# Patient Record
Sex: MALE | Race: WHITE | NOT HISPANIC OR LATINO
[De-identification: names, ages, dates, MRNs, and addresses within clinical notes are randomized per-mention and may not be internally consistent; named-entity substitution may affect disease eponyms.]

---

## 2018-12-26 ENCOUNTER — MEDICATION RENEWAL (OUTPATIENT)
Age: 83
End: 2018-12-26

## 2019-01-31 ENCOUNTER — OTHER (OUTPATIENT)
Age: 84
End: 2019-01-31

## 2019-02-01 ENCOUNTER — RECORD ABSTRACTING (OUTPATIENT)
Age: 84
End: 2019-02-01

## 2019-02-01 DIAGNOSIS — R19.7 DIARRHEA, UNSPECIFIED: ICD-10-CM

## 2019-02-01 DIAGNOSIS — Z98.62 PERIPHERAL VASCULAR ANGIOPLASTY STATUS: ICD-10-CM

## 2019-02-01 DIAGNOSIS — Z87.09 PERSONAL HISTORY OF OTHER DISEASES OF THE RESPIRATORY SYSTEM: ICD-10-CM

## 2019-02-01 DIAGNOSIS — M23.90 UNSPECIFIED INTERNAL DERANGEMENT OF UNSPECIFIED KNEE: ICD-10-CM

## 2019-02-01 RX ORDER — FLUTICASONE PROPIONATE 50 UG/1
50 SPRAY, METERED NASAL DAILY
Qty: 1 | Refills: 0 | Status: ACTIVE | COMMUNITY

## 2019-02-01 RX ORDER — IPRATROPIUM BROMIDE 21 UG/1
0.03 SPRAY NASAL
Refills: 0 | Status: ACTIVE | COMMUNITY

## 2019-02-01 RX ORDER — AZELASTINE HYDROCHLORIDE 137 UG/1
0.1 SPRAY, METERED NASAL
Refills: 0 | Status: ACTIVE | COMMUNITY

## 2019-02-04 ENCOUNTER — APPOINTMENT (OUTPATIENT)
Dept: INTERNAL MEDICINE | Facility: CLINIC | Age: 84
End: 2019-02-04
Payer: MEDICARE

## 2019-02-04 ENCOUNTER — NON-APPOINTMENT (OUTPATIENT)
Age: 84
End: 2019-02-04

## 2019-02-04 ENCOUNTER — INPATIENT (INPATIENT)
Facility: HOSPITAL | Age: 84
LOS: 2 days | Discharge: HOME | End: 2019-02-07
Attending: HOSPITALIST | Admitting: HOSPITALIST
Payer: MEDICARE

## 2019-02-04 VITALS
TEMPERATURE: 97.4 F | WEIGHT: 160 LBS | SYSTOLIC BLOOD PRESSURE: 114 MMHG | RESPIRATION RATE: 16 BRPM | OXYGEN SATURATION: 96 % | HEIGHT: 72 IN | BODY MASS INDEX: 21.67 KG/M2 | HEART RATE: 39 BPM | DIASTOLIC BLOOD PRESSURE: 52 MMHG

## 2019-02-04 VITALS
HEART RATE: 46 BPM | SYSTOLIC BLOOD PRESSURE: 115 MMHG | RESPIRATION RATE: 16 BRPM | DIASTOLIC BLOOD PRESSURE: 40 MMHG | TEMPERATURE: 98 F | OXYGEN SATURATION: 99 %

## 2019-02-04 DIAGNOSIS — N40.0 BENIGN PROSTATIC HYPERPLASIA WITHOUT LOWER URINARY TRACT SYMPTOMS: ICD-10-CM

## 2019-02-04 DIAGNOSIS — I25.10 ATHEROSCLEROTIC HEART DISEASE OF NATIVE CORONARY ARTERY WITHOUT ANGINA PECTORIS: ICD-10-CM

## 2019-02-04 DIAGNOSIS — Z98.890 OTHER SPECIFIED POSTPROCEDURAL STATES: Chronic | ICD-10-CM

## 2019-02-04 DIAGNOSIS — Z95.5 PRESENCE OF CORONARY ANGIOPLASTY IMPLANT AND GRAFT: Chronic | ICD-10-CM

## 2019-02-04 DIAGNOSIS — Z29.9 ENCOUNTER FOR PROPHYLACTIC MEASURES, UNSPECIFIED: ICD-10-CM

## 2019-02-04 DIAGNOSIS — Z95.810 PRESENCE OF AUTOMATIC (IMPLANTABLE) CARDIAC DEFIBRILLATOR: Chronic | ICD-10-CM

## 2019-02-04 DIAGNOSIS — T14.8XXA OTHER INJURY OF UNSPECIFIED BODY REGION, INITIAL ENCOUNTER: ICD-10-CM

## 2019-02-04 DIAGNOSIS — D64.9 ANEMIA, UNSPECIFIED: ICD-10-CM

## 2019-02-04 DIAGNOSIS — Z96.659 PRESENCE OF UNSPECIFIED ARTIFICIAL KNEE JOINT: Chronic | ICD-10-CM

## 2019-02-04 DIAGNOSIS — I10 ESSENTIAL (PRIMARY) HYPERTENSION: ICD-10-CM

## 2019-02-04 DIAGNOSIS — I49.3 VENTRICULAR PREMATURE DEPOLARIZATION: ICD-10-CM

## 2019-02-04 DIAGNOSIS — I49.9 CARDIAC ARRHYTHMIA, UNSPECIFIED: ICD-10-CM

## 2019-02-04 LAB
T4 AB SER-ACNC: 5.23 UG/DL — SIGNIFICANT CHANGE UP (ref 5.1–13)
TROPONIN T, HIGH SENSITIVITY: 16 NG/L — SIGNIFICANT CHANGE UP (ref ?–14)
TSH SERPL-MCNC: 0.92 UIU/ML — SIGNIFICANT CHANGE UP (ref 0.27–4.2)

## 2019-02-04 PROCEDURE — 99214 OFFICE O/P EST MOD 30 MIN: CPT | Mod: 25,PD

## 2019-02-04 PROCEDURE — 71046 X-RAY EXAM CHEST 2 VIEWS: CPT | Mod: 26

## 2019-02-04 PROCEDURE — 93000 ELECTROCARDIOGRAM COMPLETE: CPT | Mod: PD

## 2019-02-04 PROCEDURE — 99223 1ST HOSP IP/OBS HIGH 75: CPT

## 2019-02-04 PROCEDURE — 99223 1ST HOSP IP/OBS HIGH 75: CPT | Mod: GC

## 2019-02-04 RX ORDER — TAMSULOSIN HYDROCHLORIDE 0.4 MG/1
0.4 CAPSULE ORAL AT BEDTIME
Qty: 0 | Refills: 0 | Status: DISCONTINUED | OUTPATIENT
Start: 2019-02-04 | End: 2019-02-07

## 2019-02-04 RX ORDER — AMIODARONE HYDROCHLORIDE 400 MG/1
400 TABLET ORAL ONCE
Qty: 0 | Refills: 0 | Status: COMPLETED | OUTPATIENT
Start: 2019-02-04 | End: 2019-02-04

## 2019-02-04 RX ORDER — LOSARTAN POTASSIUM 100 MG/1
25 TABLET, FILM COATED ORAL EVERY 12 HOURS
Qty: 0 | Refills: 0 | Status: DISCONTINUED | OUTPATIENT
Start: 2019-02-04 | End: 2019-02-07

## 2019-02-04 RX ORDER — FINASTERIDE 5 MG/1
5 TABLET, FILM COATED ORAL DAILY
Qty: 0 | Refills: 0 | Status: DISCONTINUED | OUTPATIENT
Start: 2019-02-04 | End: 2019-02-07

## 2019-02-04 RX ORDER — SIMVASTATIN 20 MG/1
20 TABLET, FILM COATED ORAL AT BEDTIME
Qty: 0 | Refills: 0 | Status: DISCONTINUED | OUTPATIENT
Start: 2019-02-04 | End: 2019-02-07

## 2019-02-04 RX ORDER — ASPIRIN/CALCIUM CARB/MAGNESIUM 324 MG
81 TABLET ORAL DAILY
Qty: 0 | Refills: 0 | Status: DISCONTINUED | OUTPATIENT
Start: 2019-02-04 | End: 2019-02-07

## 2019-02-04 RX ORDER — SODIUM CHLORIDE 9 MG/ML
1000 INJECTION INTRAMUSCULAR; INTRAVENOUS; SUBCUTANEOUS ONCE
Qty: 0 | Refills: 0 | Status: COMPLETED | OUTPATIENT
Start: 2019-02-04 | End: 2019-02-04

## 2019-02-04 RX ORDER — AMIODARONE HYDROCHLORIDE 400 MG/1
200 TABLET ORAL DAILY
Qty: 0 | Refills: 0 | Status: DISCONTINUED | OUTPATIENT
Start: 2019-02-08 | End: 2019-02-07

## 2019-02-04 RX ORDER — SODIUM CHLORIDE 9 MG/ML
3 INJECTION INTRAMUSCULAR; INTRAVENOUS; SUBCUTANEOUS EVERY 8 HOURS
Qty: 0 | Refills: 0 | Status: DISCONTINUED | OUTPATIENT
Start: 2019-02-04 | End: 2019-02-07

## 2019-02-04 RX ORDER — AMIODARONE HYDROCHLORIDE 400 MG/1
400 TABLET ORAL EVERY 8 HOURS
Qty: 0 | Refills: 0 | Status: DISCONTINUED | OUTPATIENT
Start: 2019-02-05 | End: 2019-02-07

## 2019-02-04 RX ORDER — FINASTERIDE 5 MG/1
5 TABLET, FILM COATED ORAL DAILY
Qty: 90 | Refills: 0 | Status: DISCONTINUED | COMMUNITY
End: 2019-02-04

## 2019-02-04 RX ORDER — ESCITALOPRAM OXALATE 10 MG/1
20 TABLET, FILM COATED ORAL DAILY
Qty: 0 | Refills: 0 | Status: DISCONTINUED | OUTPATIENT
Start: 2019-02-04 | End: 2019-02-07

## 2019-02-04 RX ADMIN — SODIUM CHLORIDE 2000 MILLILITER(S): 9 INJECTION INTRAMUSCULAR; INTRAVENOUS; SUBCUTANEOUS at 18:28

## 2019-02-04 RX ADMIN — AMIODARONE HYDROCHLORIDE 400 MILLIGRAM(S): 400 TABLET ORAL at 18:27

## 2019-02-04 RX ADMIN — SODIUM CHLORIDE 3 MILLILITER(S): 9 INJECTION INTRAMUSCULAR; INTRAVENOUS; SUBCUTANEOUS at 22:36

## 2019-02-04 RX ADMIN — SIMVASTATIN 20 MILLIGRAM(S): 20 TABLET, FILM COATED ORAL at 22:34

## 2019-02-04 RX ADMIN — TAMSULOSIN HYDROCHLORIDE 0.4 MILLIGRAM(S): 0.4 CAPSULE ORAL at 22:34

## 2019-02-04 NOTE — ED PROVIDER NOTE - MEDICAL DECISION MAKING DETAILS
Priscilla: 84M w/PMH MI s/p stents, AICD/PPM, recent hernia repair p/w bradycardia/bigeminy. R/o AICD malfunction, lyte abnormality, arrhythmia. Labs, EKG/tele, EP interrogation, admit.

## 2019-02-04 NOTE — H&P ADULT - PROBLEM SELECTOR PLAN 2
cont coreg, cozaar Monitor h/H closely.  patient with recent surgery and post op complication of femoral hematoma. Monitor the hematoma closely.   B12, folate, retic ordered Monitor h/H closely.  patient with recent surgery and post op complication of femoral hematoma. Monitor the hematoma closely.   B12, folate, retic ordered in setting of macrocytosis

## 2019-02-04 NOTE — ED ADULT NURSE NOTE - NSIMPLEMENTINTERV_GEN_ALL_ED
Implemented All Fall Risk Interventions:  [place] to call system. Call bell, personal items and telephone within reach. Instruct patient to call for assistance. Room bathroom lighting operational. Non-slip footwear when patient is off stretcher. Physically safe environment: no spills, clutter or unnecessary equipment. Stretcher in lowest position, wheels locked, appropriate side rails in place. Provide visual cue, wrist band, yellow gown, etc. Monitor gait and stability. Monitor for mental status changes and reorient to person, place, and time. Review medications for side effects contributing to fall risk. Reinforce activity limits and safety measures with patient and family.

## 2019-02-04 NOTE — ED ADULT TRIAGE NOTE - CHIEF COMPLAINT QUOTE
Pt sent by his PMD for low heart rate, possible malfunctioning PPM/AICD.  Pt's heart rate at PMD office was 39, current heart rate is 46.  EKG abnormal in triage, pt denies pain.

## 2019-02-04 NOTE — H&P ADULT - PROBLEM SELECTOR PLAN 1
Admit to tele  check cbc, bmp, a1c, flp, tsh, trend CE  EP consult appreciated  Amio load per EP for increased PVC burden since the surgery   possible ablation Admit to tele  check cbc, bmp, a1c, flp, tsh, trend CE  EP consult appreciated  Amio load per EP for increased PVC burden since the surgery. Will hold coreg per EP  Echo ordered   possible ablation this admission or as outpatient  Discussed with Dr. Ly

## 2019-02-04 NOTE — H&P ADULT - ASSESSMENT
85yo male with past medical history of hypertension, hyperlipidemia,  coronary artery disease s/p MI 2006, s/p stents, ischemic cardiomyopathy, ventricular tachycardia s/p Medtronic dual chamber ICD 4/2007 with generator change 2 years ago, osteoarthritis s/p bilateral total knee replacements and recent incarcerated inguinal hernia s/p repair 1/13/2019 at [facility] complicated by hematoma and anemia sent by his cardiologist for an abnormal heart rate.

## 2019-02-04 NOTE — CONSULT NOTE ADULT - ASSESSMENT
Patient is a 83yo male with past medical history of hypertension, hyperlipidemia,  coronary artery disease s/p MI 2006, s/p stents, ischemic cardiomyopathy, ventricular tachycardia s/p Medtronic dual chamber ICD 4/2007 with generator change 2 years ago, osteoarthritis s/p bilateral total knee replacements and recent incarcerated inguinal hernia s/p repair 1/13/2019 at [facility] complicated by hematoma and anemia sent by his cardiologist for an abnormal heart rate.  ICD interrogation shows normal sinus rhythm, PVC's with significantly increased PVC burden and intermittent bigeminy. No nonsustained VT or therapies.  No atrial arrhythmias.  Patient currently asymptomatic. Device sensing  and pacing well. Battery good. Electrolytes WNL and Hgb/HCT stable.    Impression: A possible explanation for his symptoms include an increased PVC burden since his surgery on 1/13/2019. We have discussed with the patient and his wife treatment strategies including suppression with an antiarrhythmic agent and/or PVC ablation.  After reviewing the risks and benefits they have decided on medical management at this time with possible ablation in the future when he feels more recovered from his hernia surgery.   Plan:  Amiodarone 5 gram load: 400mg po tid x 12 doses, then 200mg daily for maintenance.           TFT's with next blood draw.  Will need PFT's as an outpatient.           Cardiac enzymes           Viral panel            His lower rate limit on the device was increased to 60 bpm which will prevent medication induced bradycardia.

## 2019-02-04 NOTE — H&P ADULT - HISTORY OF PRESENT ILLNESS
85yo male with past medical history of hypertension, hyperlipidemia,  coronary artery disease s/p MI 2006, s/p stents, ischemic cardiomyopathy, ventricular tachycardia s/p Medtronic dual chamber ICD 4/2007 with generator change 2 years ago, osteoarthritis s/p bilateral total knee replacements and recent incarcerated inguinal hernia s/p repair 1/13/2019 at [facility] complicated by hematoma and anemia sent by his cardiologist for an abnormal heart rate. Patient went to PMD today for a regular check up. At PMD's office, his heart rate was low as 39 and PPM was capturing [per ED notes] and was sent in for further eval. Patient states he was having generalized fatigue and poor appetite since the surgery and has lost about 8 lbs since then. He had an emergent inguinal hernia surgery about few weeks ago which was complicated by hematoma. He states the hematoma is improving and his surgical incision is also healing. denies any pain or other discomfort at that surgical site. He also complains of intermittent dizziness which is worse while ambulating since the surgery. No syncope or falls. He also complains of intermittent palpitations for months. Denies fever, chills, cough, falls, LOC, chest pain, abdominal pain, nausea, vomiting, melena, hematochezia, LE edema, or calf tenderness.     Previously on plavix prior to the surgery

## 2019-02-04 NOTE — H&P ADULT - PSH
History of knee replacement    S/P coronary artery stent placement    S/P hernia surgery  Emergent incarcerated Inguinal hernia  sx complicated by hematoma and anemia in Jan 2019  S/P implantation of automatic cardioverter/defibrillator (AICD)  AND PPM

## 2019-02-04 NOTE — ED PROVIDER NOTE - SKIN, MLM
Skin normal color for race, warm, dry and intact. +ecchymosis b/l low abd to groin/upper thigh; incision site c/d/i with mild edema

## 2019-02-04 NOTE — H&P ADULT - PMH
Arrhythmia  s/p AICD/PPM  Benign prostatic hyperplasia    Coronary artery disease  s/p stents  Hyperlipidemia    Hypertension

## 2019-02-04 NOTE — H&P ADULT - PROBLEM SELECTOR PLAN 3
cont asa, statin patient with recent surgery and post op complication of femoral hematoma/ecchymosis. Per patient, hematoma has improved a lot. Not painful.

## 2019-02-04 NOTE — ED PROVIDER NOTE - ATTENDING CONTRIBUTION TO CARE
Dr. Rachel:  I have personally performed a face to face bedside history and physical examination of this patient. I have discussed the history, examination, review of systems, assessment and plan of management with the resident. I have reviewed the electronic medical record and amended it to reflect my history, review of systems, physical exam, assessment and plan.    84M h/o AICD placement sent in by Dr. Xavier for abnormal HR.  Pt s/p recent abdominal surgery (?incarcerated hernia) on 1/13/19, for which AICD was turned off then turned back on after surgery with normal function.  However, over past 2-4 days, patient notes increased fatigue and lightheadedness.  Went to see his doctor today and noted to be bradycardic to 30's, sent to ED.    Exam:  - nad  - herminio, irregular  - ctab  - abd soft ntnd    A/P  - EKG shows no pacemaker spikes  - cbc, cmp, mg, phos, trop  - EP consulted for AICD interrogation

## 2019-02-04 NOTE — ED ADULT NURSE NOTE - OBJECTIVE STATEMENT
85 yo male A&o x 4, ambulatory at baseline, was at PCP and HR was 39. Pt was hospitalized on January 13 for emergency inguinal hernia repair. pt has pacemaker and AICD and reports that providers ordered for t 83 yo male A&o x 4, ambulatory at baseline, was at [facility] and HR was 39. Pt was hospitalized on January 13, 2019 for emergency inguinal hernia repair. pt has pacemaker/ AICD (2007) and reports that providers ordered for it to be turned off for surgery on 1/13 and that they were informed it was also turned back on. while pt was hospitalized, he became anemic and needed two units of blood. pt has hematoma on the incision site that PCP is aware of and monitoring. Pt has bruising bilaterally next to incision area but denies pain. pt has not taken Plavix since 1/12/19 and is currently only taking 1 baby aspirin daily.  Pt reports feeling very tired, weak and lethargic the last 2 days. PMH of heart attack in 2006 resulting with two stents being placed. pt denies any chest pain or SOB at this time.

## 2019-02-04 NOTE — ED CLERICAL - NS ED CLERK NOTE PRE-ARRIVAL INFORMATION; ADDITIONAL PRE-ARRIVAL INFORMATION
Recent surgery at [facility], AICD/PPM deactivated for surgery.  reactivated as per pt report but HR 39 Sent for device check.

## 2019-02-04 NOTE — H&P ADULT - PROBLEM SELECTOR PLAN 5
LE stockings given the hematoma.  Monitor H/H closely cont asa, statin  patient has not been taking plavix since the surgery. Will hold for now.

## 2019-02-04 NOTE — H&P ADULT - GASTROINTESTINAL COMMENTS
s/p hernia repair. hematoma noticed in the L femoral region [per patient improving]. Hernia surgical site appears clean, intact and dry. Incision is healing.

## 2019-02-04 NOTE — ED PROVIDER NOTE - PROGRESS NOTE DETAILS
AJM: Pt received in signout. VSS. seen by EP for interrogation. Signed out to hospitalist for admission and tele PA made aware. workup unremarkable for acute events. + bigeminy on ecg

## 2019-02-04 NOTE — H&P ADULT - NEGATIVE CARDIOVASCULAR SYMPTOMS
no paroxysmal nocturnal dyspnea/no peripheral edema/no chest pain/no orthopnea/no dyspnea on exertion

## 2019-02-04 NOTE — ED ADULT NURSE NOTE - NSFALLRSKASSESSTYPE_ED_ALL_ED
Interpretation:    This home sleep study was performed on 10/13/2017 using an apnea link air type III device. The recording time was 4 hours and 13 minutes, the monitoring time for flow duration was 2 hours and 10 minutes, and the oxygen saturation evaluation was 4 hours and 2 minutes.    Severe obstructive sleep apnea hypopnea was found. The respiratory events index was 64.7. The patient did not experience any central apneas  The patient had 342 oxygen desaturations yielding an oxygen desaturation index of 84.7. The saturations were less than or equal to 90% for 97% of the recording. The patient spent 3 hours and 17 minutes with saturations less than or equal to 88%. The heart rate varied between 40 bpm and 110 bpm with an average of 75 bpm. The patient slept in all positions. Snoring was noted throughout the recording.    Recommendation:    Recommend a positive airway pressure titration.  
Initial (On Arrival)

## 2019-02-04 NOTE — CONSULT NOTE ADULT - SUBJECTIVE AND OBJECTIVE BOX
Patient is a 84y old  Male who presents with a chief complaint of       PAST MEDICAL & SURGICAL HISTORY:                ALLERGIES:   NKDA    MEDICATIONS  (STANDING):  amiodarone    Tablet 400 milliGRAM(s) Oral Once  sodium chloride 0.9% Bolus 1000 milliLiter(s) IV Bolus once    MEDICATIONS  (PRN):      FAMILY HISTORY:  noncontribuatory      SOCIAL HISTORY: Patient retired.  Lives with his wife    CIGARETTES:   never  DRUGS:          No  ALCOHOL:      No    REVIEW OF SYSTEMS:    CONSTITUTIONAL: No fever, chills, shakes  + fatigue and post surgical weight loss of 8 lbs.  EYES: No eye pain, visual disturbances, or discharge  ENMT:  No difficulty hearing, tinnitus, vertigo; No sinus or throat pain  NECK: No pain or stiffness  BREASTS: No pain, masses, or nipple discharge  RESPIRATORY: No cough, wheezing, hemoptysis  Occasional shortness of breath associated with palpitations  CARDIOVASCULAR: No chest pain      + dyspnea, palpitations, dizziness, near syncope  GASTROINTESTINAL: No abdominal  or epigastric pain, nausea, vomiting, hematemesis, diarrhea, constipation, melena or bright red blood.  GENITOURINARY: No dysuria, nocturia, hematuria, or urinary incontinence  NEUROLOGICAL: No headaches, memory loss, slurred speech, limb weakness, loss of strength, numbness, or tremors  SKIN: No itching, burning, rashes, or lesions    + pelvic ecchymosis with right groin swelling.   LYMPH NODES: No enlarged glands  ENDOCRINE: No heat or cold intolerance, or hair loss  MUSCULOSKELETAL: No joint pain or swelling, muscle, back, or extremity pain  PSYCHIATRIC: + depression and anxiety -> treated   HEME/LYMPH: + easy bruising 2/2 Plavix and aspirin  ALLERGY AND IMMUNOLOGIC: No hives or rash.      Vital Signs Last 24 Hrs  T(C): 36.5 (04 Feb 2019 16:00), Max: 36.9 (04 Feb 2019 15:12)  T(F): 97.7 (04 Feb 2019 16:00), Max: 98.4 (04 Feb 2019 15:12)  HR: 56 (04 Feb 2019 16:00) (46 - 56)  BP: 122/59 (04 Feb 2019 16:00) (115/40 - 122/59)  BP(mean): --  RR: 22 (04 Feb 2019 16:00) (16 - 22)  SpO2: 97% (04 Feb 2019 16:00) (97% - 99%)    PHYSICAL EXAM:    GENERAL: Appears fatigued but NAD. Well nourished, and hydrated.  HEAD:  Atraumatic, Normocephalic  EYES: EOMI, PERRLA, conjunctiva and sclera clear  ENMT: No tonsillar erythema, exudates, or enlargement; Moist mucous membranes, Fair dentition, No lesions  NECK: Supple and normal thyroid.  No JVD    + carotid bruit (not new)    HEART: Irregular rate and rhythm; No murmurs, rubs, or gallops appreciated  PULMONARY: Clear to auscultation and perfusion.  No rales, wheezing, or rhonchi bilaterally.  ABDOMEN: Soft, Nontender, Nondistended; Bowel sounds present    Pelvis/left groin  with residual swelling and ecchymosis from surgery 1/13/2019  EXTREMITIES:  2+ Peripheral Pulses, No clubbing, cyanosis, or edema  LYMPH: No lymphadenopathy noted  NEUROLOGICAL: Grossly nonfocal          INTERPRETATION OF TELEMETRY:  Sinus bradycardia with intermittent ventricular pacing and PVC's with intermittent bigeminy.    ECG:            LABS:                        12.8   6.53  )-----------( 281      ( 04 Feb 2019 15:40 )             38.9     02-04    138  |  103  |  19  ----------------------------<  106<H>  4.5   |  26  |  0.91    Ca    9.3      04 Feb 2019 15:40  Phos  2.5     02-04  Mg     2.1     02-04    TPro  6.6  /  Alb  3.7  /  TBili  1.2  /  DBili  x   /  AST  15  /  ALT  7   /  AlkPhos  79  02-04        PT/INR - ( 04 Feb 2019 15:40 )   PT: 12.8 SEC;   INR: 1.12          PTT - ( 04 Feb 2019 15:40 )  PTT:34.3 SEC    BNP  I&O's Detail    Daily     Daily     RADIOLOGY & ADDITIONAL STUDIES:  PREVIOUS DIAGNOSTIC TESTING:      ECHO  FINDINGS:    STRESS  FINDINGS:    CATHETERIZATION  FINDINGS:      ASSESSMENT:        RECOMMENDATIONS: Patient is a 83yo male with past medical history of hypertension, hyperlipidemia,  coronary artery disease s/p MI 2006, s/p stents, ischemic cardiomyopathy, ventricular tachycardia s/p Medtronic dual chamber ICD 4/2007 with generator change 2 years ago,  osteoarthritis s/p bilateral total knee replacements and recent incarcerated inguina hernia s/p repair 1/13/2019 at [facility] complicated by hematoma and anemia requiring transfusion x2 who had a slow recovery but was feeling better until three days ago when he noticed increased fatigue, palpitations, intermittent shortness of breath and episodes of near syncope.  He saw his cardiologist, Dr. Ochoa, who found him to be bradycardic and sent him to the ED for evaluation.  His EKG showed normal sinus rhythm with bigeminy.   His device was interrogated showing normal sinus rhythm with frequent PVC's and intermittent bigeminy. His PVC burden since 1/13/2019 almost doubled from the previous interrogation:  279 per hour,  now 591 per hour. No episodes of NSVT or atrial arrhythmias noted. No therapies delivered. Sensing and pacing evaluated via iterative testing. Lower rate limit was increased from 50 to 60 bpm in an attempt to suppress some of the PVC's.       PAST MEDICAL & SURGICAL HISTORY:  Hypertension  Hyperlipidemia  BPH  CAD s/p MI 2006 s/p stents  Cardiomyopathy   Ventricular tachycardia  Medtronic ICD 4/2007 with generator change 2017  Osteoarthritis s/p bilateral TKR's  Depression/Anxiety: treated  Inguinal hernia repair x2, last one 1/13/2019        ALLERGIES:   NKDA    MEDICATIONS  (STANDING):  amiodarone    Tablet 400 milliGRAM(s) Oral Once  sodium chloride 0.9% Bolus 1000 milliLiter(s) IV Bolus once  Cozaar 25mg   Coreg 25mg bid  Zocor 20mg daily  Flomax  Finasteride  Klonopin  Ecotrin  Lexapro 20mg daily    MEDICATIONS  (PRN):      FAMILY HISTORY:  noncontribuatory      SOCIAL HISTORY: Patient retired.  Lives with his wife    CIGARETTES:   never  DRUGS:          No  ALCOHOL:      No    REVIEW OF SYSTEMS:    CONSTITUTIONAL: No fever, chills, shakes  + fatigue and post surgical weight loss of 8 lbs.  EYES: No eye pain, visual disturbances, or discharge  ENMT:  No difficulty hearing, tinnitus, vertigo; No sinus or throat pain  NECK: No pain or stiffness  BREASTS: No pain, masses, or nipple discharge  RESPIRATORY: No cough, wheezing, hemoptysis  Occasional shortness of breath associated with palpitations  CARDIOVASCULAR: No chest pain      + dyspnea, palpitations, dizziness, near syncope  GASTROINTESTINAL: No abdominal  or epigastric pain, nausea, vomiting, hematemesis, diarrhea, constipation, melena or bright red blood.  GENITOURINARY: No dysuria, nocturia, hematuria, or urinary incontinence  NEUROLOGICAL: No headaches, memory loss, slurred speech, limb weakness, loss of strength, numbness, or tremors  SKIN: No itching, burning, rashes, or lesions    + pelvic ecchymosis with right groin swelling.   LYMPH NODES: No enlarged glands  ENDOCRINE: No heat or cold intolerance, or hair loss  MUSCULOSKELETAL: No joint pain or swelling, muscle, back, or extremity pain  PSYCHIATRIC: + depression and anxiety -> treated   HEME/LYMPH: + easy bruising 2/2 Plavix and aspirin  ALLERGY AND IMMUNOLOGIC: No hives or rash.      Vital Signs Last 24 Hrs  T(C): 36.5 (04 Feb 2019 16:00), Max: 36.9 (04 Feb 2019 15:12)  T(F): 97.7 (04 Feb 2019 16:00), Max: 98.4 (04 Feb 2019 15:12)  HR: 56 (04 Feb 2019 16:00) (46 - 56)  BP: 122/59 (04 Feb 2019 16:00) (115/40 - 122/59)  BP(mean): --  RR: 22 (04 Feb 2019 16:00) (16 - 22)  SpO2: 97% (04 Feb 2019 16:00) (97% - 99%)    PHYSICAL EXAM:    GENERAL: Appears fatigued but NAD. Well nourished, and hydrated.  HEAD:  Atraumatic, Normocephalic  EYES: EOMI, PERRLA, conjunctiva and sclera clear  ENMT: No tonsillar erythema, exudates, or enlargement; Moist mucous membranes, Fair dentition, No lesions  NECK: Supple and normal thyroid.  No JVD    + carotid bruit (not new)    HEART: Irregular rate and rhythm; No murmurs, rubs, or gallops appreciated  PULMONARY: Clear to auscultation and perfusion.  No rales, wheezing, or rhonchi bilaterally.  ABDOMEN: Soft, Nontender, Nondistended; Bowel sounds present    Pelvis/left groin  with residual swelling and ecchymosis from surgery 1/13/2019  EXTREMITIES:  2+ Peripheral Pulses, No clubbing, cyanosis, or edema  LYMPH: No lymphadenopathy noted  NEUROLOGICAL: Grossly nonfocal    INTERPRETATION OF TELEMETRY:  Sinus bradycardia with intermittent ventricular pacing and PVC's with intermittent bigeminy.    ECG:    LABS:                        12.8   6.53  )-----------( 281      ( 04 Feb 2019 15:40 )             38.9     02-04    138  |  103  |  19  ----------------------------<  106<H>  4.5   |  26  |  0.91    Ca    9.3      04 Feb 2019 15:40  Phos  2.5     02-04  Mg     2.1     02-04    TPro  6.6  /  Alb  3.7  /  TBili  1.2  /  DBili  x   /  AST  15  /  ALT  7   /  AlkPhos  79  02-04    PT/INR - ( 04 Feb 2019 15:40 )   PT: 12.8 SEC;   INR: 1.12  PTT - ( 04 Feb 2019 15:40 )  PTT:34.3 SEC        RADIOLOGY & ADDITIONAL STUDIES:  PREVIOUS DIAGNOSTIC TESTING:    N/A      ECHO  FINDINGS:  N/A      STRESS  FINDINGS:  N/A        CATHETERIZATION  FINDINGS:  N/A

## 2019-02-04 NOTE — H&P ADULT - NSHPLABSRESULTS_GEN_ALL_CORE
EKG: sinus with frequent apaced complex at 64 BPM                          12.8   6.53  )-----------( 281      ( 04 Feb 2019 15:40 )             38.9   02-04    138  |  103  |  19  ----------------------------<  106<H>  4.5   |  26  |  0.91    Ca    9.3      04 Feb 2019 15:40  Phos  2.5     02-04  Mg     2.1     02-04    TPro  6.6  /  Alb  3.7  /  TBili  1.2  /  DBili  x   /  AST  15  /  ALT  7   /  AlkPhos  79  02-04    Troponin T, High Sensitivity: 16: ---------------------***PLEASE NOTE***----------------------  Rapid changes upward or downward in high-sensitivity  troponin levels strongly suggest acute myocardial injury.  Hemolysis may falsely lower results. Renal impairment may  increase results.    Normal: <6 - 14 ng/L  Indeterminate: 15 - 51 ng/L  Elevated: >51 ng/L    Please see "[email or web address]" on the [employer] for more information. ng/L (02.04.19 @ 18:47)    Troponin T, High Sensitivity: 20: ---------------------***PLEASE NOTE***----------------------  Rapid changes upward or downward in high-sensitivity  troponin levels strongly suggest acute myocardial injury.  Hemolysis may falsely lower results. Renal impairment may  increase results.    Normal: <6 - 14 ng/L  Indeterminate: 15 - 51 ng/L  Elevated: >51 ng/L    Please see "[email or web address]" on the [employer] for more information. ng/L (02.04.19 @ 15:40)

## 2019-02-04 NOTE — ED PROVIDER NOTE - OBJECTIVE STATEMENT
84M w/PMH MI 2006 s/p stents, AICD/PPM 2007 2/2 arrhythmia, hernia repair x2 (s/p most recent for incarcerated hernia Jan 13 at [facility] c/b hematoma and anemia requiring transfusion), htn, hld, BPH p/w bradycardia at cardiologist's office today (39, PPM did not appear to be capturing per report), gen fatigue and dec PO x3d. +palp yesterday. Denies f/c, syncope, diaphoresis, cp/sob, cough, new abd pain, n/v/d/c, melena/BRBPR, urinary sx. AICD was deactivated for surgery Jan 13 and reportedly reactivated. Battery last replaced 2y ago.

## 2019-02-05 ENCOUNTER — TRANSCRIPTION ENCOUNTER (OUTPATIENT)
Age: 84
End: 2019-02-05

## 2019-02-05 DIAGNOSIS — F41.9 ANXIETY DISORDER, UNSPECIFIED: ICD-10-CM

## 2019-02-05 LAB
ANION GAP SERPL CALC-SCNC: 8 MMO/L — SIGNIFICANT CHANGE UP (ref 7–14)
BASOPHILS # BLD AUTO: 0.03 K/UL — SIGNIFICANT CHANGE UP (ref 0–0.2)
BASOPHILS NFR BLD AUTO: 0.5 % — SIGNIFICANT CHANGE UP (ref 0–2)
BUN SERPL-MCNC: 16 MG/DL — SIGNIFICANT CHANGE UP (ref 7–23)
CALCIUM SERPL-MCNC: 9 MG/DL — SIGNIFICANT CHANGE UP (ref 8.4–10.5)
CHLORIDE SERPL-SCNC: 103 MMOL/L — SIGNIFICANT CHANGE UP (ref 98–107)
CHOLEST SERPL-MCNC: 93 MG/DL — LOW (ref 120–199)
CO2 SERPL-SCNC: 24 MMOL/L — SIGNIFICANT CHANGE UP (ref 22–31)
CREAT SERPL-MCNC: 0.78 MG/DL — SIGNIFICANT CHANGE UP (ref 0.5–1.3)
EOSINOPHIL # BLD AUTO: 0.15 K/UL — SIGNIFICANT CHANGE UP (ref 0–0.5)
EOSINOPHIL NFR BLD AUTO: 2.3 % — SIGNIFICANT CHANGE UP (ref 0–6)
FOLATE SERPL-MCNC: 16.3 NG/ML — SIGNIFICANT CHANGE UP (ref 4.7–20)
GLUCOSE SERPL-MCNC: 82 MG/DL — SIGNIFICANT CHANGE UP (ref 70–99)
HBA1C BLD-MCNC: 4.8 % — SIGNIFICANT CHANGE UP (ref 4–5.6)
HCT VFR BLD CALC: 36.6 % — LOW (ref 39–50)
HDLC SERPL-MCNC: 34 MG/DL — LOW (ref 35–55)
HGB BLD-MCNC: 12 G/DL — LOW (ref 13–17)
IMM GRANULOCYTES NFR BLD AUTO: 0.3 % — SIGNIFICANT CHANGE UP (ref 0–1.5)
LIPID PNL WITH DIRECT LDL SERPL: 55 MG/DL — SIGNIFICANT CHANGE UP
LYMPHOCYTES # BLD AUTO: 1.58 K/UL — SIGNIFICANT CHANGE UP (ref 1–3.3)
LYMPHOCYTES # BLD AUTO: 24.4 % — SIGNIFICANT CHANGE UP (ref 13–44)
MAGNESIUM SERPL-MCNC: 1.9 MG/DL — SIGNIFICANT CHANGE UP (ref 1.6–2.6)
MCHC RBC-ENTMCNC: 32.3 PG — SIGNIFICANT CHANGE UP (ref 27–34)
MCHC RBC-ENTMCNC: 32.8 % — SIGNIFICANT CHANGE UP (ref 32–36)
MCV RBC AUTO: 98.7 FL — SIGNIFICANT CHANGE UP (ref 80–100)
MONOCYTES # BLD AUTO: 0.71 K/UL — SIGNIFICANT CHANGE UP (ref 0–0.9)
MONOCYTES NFR BLD AUTO: 11 % — SIGNIFICANT CHANGE UP (ref 2–14)
NEUTROPHILS # BLD AUTO: 3.98 K/UL — SIGNIFICANT CHANGE UP (ref 1.8–7.4)
NEUTROPHILS NFR BLD AUTO: 61.5 % — SIGNIFICANT CHANGE UP (ref 43–77)
NRBC # FLD: 0 K/UL — LOW (ref 25–125)
PHOSPHATE SERPL-MCNC: 2.5 MG/DL — SIGNIFICANT CHANGE UP (ref 2.5–4.5)
PLATELET # BLD AUTO: 262 K/UL — SIGNIFICANT CHANGE UP (ref 150–400)
PMV BLD: 10.7 FL — SIGNIFICANT CHANGE UP (ref 7–13)
POTASSIUM SERPL-MCNC: 4.2 MMOL/L — SIGNIFICANT CHANGE UP (ref 3.5–5.3)
POTASSIUM SERPL-SCNC: 4.2 MMOL/L — SIGNIFICANT CHANGE UP (ref 3.5–5.3)
RBC # BLD: 3.71 M/UL — LOW (ref 4.2–5.8)
RBC # FLD: 14.5 % — SIGNIFICANT CHANGE UP (ref 10.3–14.5)
RETICS #: 66 K/UL — SIGNIFICANT CHANGE UP (ref 25–125)
RETICS/RBC NFR: 1.8 % — SIGNIFICANT CHANGE UP (ref 0.5–2.5)
SODIUM SERPL-SCNC: 135 MMOL/L — SIGNIFICANT CHANGE UP (ref 135–145)
TRIGL SERPL-MCNC: 80 MG/DL — SIGNIFICANT CHANGE UP (ref 10–149)
TSH SERPL-MCNC: 1.55 UIU/ML — SIGNIFICANT CHANGE UP (ref 0.27–4.2)
VIT B12 SERPL-MCNC: 538 PG/ML — SIGNIFICANT CHANGE UP (ref 200–900)
WBC # BLD: 6.47 K/UL — SIGNIFICANT CHANGE UP (ref 3.8–10.5)
WBC # FLD AUTO: 6.47 K/UL — SIGNIFICANT CHANGE UP (ref 3.8–10.5)

## 2019-02-05 PROCEDURE — 99232 SBSQ HOSP IP/OBS MODERATE 35: CPT

## 2019-02-05 PROCEDURE — 99233 SBSQ HOSP IP/OBS HIGH 50: CPT

## 2019-02-05 RX ORDER — CLONAZEPAM 1 MG
1 TABLET ORAL ONCE
Qty: 0 | Refills: 0 | Status: DISCONTINUED | OUTPATIENT
Start: 2019-02-05 | End: 2019-02-05

## 2019-02-05 RX ORDER — MAGNESIUM OXIDE 400 MG ORAL TABLET 241.3 MG
400 TABLET ORAL
Qty: 0 | Refills: 0 | Status: DISCONTINUED | OUTPATIENT
Start: 2019-02-05 | End: 2019-02-07

## 2019-02-05 RX ORDER — ZALEPLON 10 MG
5 CAPSULE ORAL ONCE
Qty: 0 | Refills: 0 | Status: DISCONTINUED | OUTPATIENT
Start: 2019-02-05 | End: 2019-02-05

## 2019-02-05 RX ORDER — CLONAZEPAM 1 MG
0.5 TABLET ORAL DAILY
Qty: 0 | Refills: 0 | Status: DISCONTINUED | OUTPATIENT
Start: 2019-02-05 | End: 2019-02-07

## 2019-02-05 RX ORDER — LANOLIN ALCOHOL/MO/W.PET/CERES
3 CREAM (GRAM) TOPICAL ONCE
Qty: 0 | Refills: 0 | Status: COMPLETED | OUTPATIENT
Start: 2019-02-05 | End: 2019-02-05

## 2019-02-05 RX ORDER — CLONAZEPAM 1 MG
1.5 TABLET ORAL AT BEDTIME
Qty: 0 | Refills: 0 | Status: DISCONTINUED | OUTPATIENT
Start: 2019-02-05 | End: 2019-02-07

## 2019-02-05 RX ORDER — ZOLPIDEM TARTRATE 10 MG/1
5 TABLET ORAL AT BEDTIME
Qty: 0 | Refills: 0 | Status: DISCONTINUED | OUTPATIENT
Start: 2019-02-05 | End: 2019-02-07

## 2019-02-05 RX ADMIN — Medication 5 MILLIGRAM(S): at 02:27

## 2019-02-05 RX ADMIN — MAGNESIUM OXIDE 400 MG ORAL TABLET 400 MILLIGRAM(S): 241.3 TABLET ORAL at 18:37

## 2019-02-05 RX ADMIN — SODIUM CHLORIDE 3 MILLILITER(S): 9 INJECTION INTRAMUSCULAR; INTRAVENOUS; SUBCUTANEOUS at 05:21

## 2019-02-05 RX ADMIN — ESCITALOPRAM OXALATE 20 MILLIGRAM(S): 10 TABLET, FILM COATED ORAL at 10:59

## 2019-02-05 RX ADMIN — Medication 3 MILLIGRAM(S): at 00:11

## 2019-02-05 RX ADMIN — SODIUM CHLORIDE 3 MILLILITER(S): 9 INJECTION INTRAMUSCULAR; INTRAVENOUS; SUBCUTANEOUS at 14:14

## 2019-02-05 RX ADMIN — LOSARTAN POTASSIUM 25 MILLIGRAM(S): 100 TABLET, FILM COATED ORAL at 05:20

## 2019-02-05 RX ADMIN — AMIODARONE HYDROCHLORIDE 400 MILLIGRAM(S): 400 TABLET ORAL at 02:05

## 2019-02-05 RX ADMIN — TAMSULOSIN HYDROCHLORIDE 0.4 MILLIGRAM(S): 0.4 CAPSULE ORAL at 22:00

## 2019-02-05 RX ADMIN — SODIUM CHLORIDE 3 MILLILITER(S): 9 INJECTION INTRAMUSCULAR; INTRAVENOUS; SUBCUTANEOUS at 21:48

## 2019-02-05 RX ADMIN — Medication 81 MILLIGRAM(S): at 11:00

## 2019-02-05 RX ADMIN — Medication 1 MILLIGRAM(S): at 10:59

## 2019-02-05 RX ADMIN — LOSARTAN POTASSIUM 25 MILLIGRAM(S): 100 TABLET, FILM COATED ORAL at 18:37

## 2019-02-05 RX ADMIN — FINASTERIDE 5 MILLIGRAM(S): 5 TABLET, FILM COATED ORAL at 11:00

## 2019-02-05 RX ADMIN — Medication 1.5 MILLIGRAM(S): at 22:01

## 2019-02-05 RX ADMIN — AMIODARONE HYDROCHLORIDE 400 MILLIGRAM(S): 400 TABLET ORAL at 10:59

## 2019-02-05 RX ADMIN — AMIODARONE HYDROCHLORIDE 400 MILLIGRAM(S): 400 TABLET ORAL at 18:37

## 2019-02-05 RX ADMIN — SIMVASTATIN 20 MILLIGRAM(S): 20 TABLET, FILM COATED ORAL at 22:00

## 2019-02-05 NOTE — DISCHARGE NOTE ADULT - PLAN OF CARE
to  remain in NSR continue amiodarone   follow up with Dr. pacheco To maintain a normal blood pressure to prevent heart attack, stroke and renal failure. To maintain normal cholesterol levels to prevent stroke, coronary artery disease, peripheral vascular disease and heart attacks. Low fat diet, exercise daily and continue current medications. Follow up with primary care physician and cardiologist for management. continue present medication continue aspirin for now - continue aspirin for now PVC (premature ventricular contraction).  Plan: Improving on tele,   c/w Amio load per EP for increased PVC burden since the surgery. per EP.   Outpt Ablation  Outpt PFTs, discussed with pt.   A possible explanation for his symptoms include an increased PVC burden since his surgery on 1/13/2019. He was started on a 5 gram Amiodarone load (6/12 doses) and is responding well with significantly less PVC's.  Tolerating the medication well. At that time he will discuss the PVC ablation and be scheduled accordingly.  The patient would like to have it done sooner than later to minimize the amount of time on Amiodarone given it's side effect profile. In the meantime he will continue to recover from his recent hernia surgery.       follow-up appointment with Dr. Koo in 2-3 weeks. At that time he will discuss the PVC ablation and be scheduled accordingly.      Dr. Koo at 3:30pm on March 1st     Amio: 400 tonight at 6pm   Amio: 400 BID tomorrow   Amio: 200 Daily starting on Sat PVC (premature ventricular contraction).  Plan: Improving on tele,   c/w Amio load per EP for increased PVC burden since the surgery. per EP.   Outpt Ablation  Outpt PFTs, discussed with pt.   A possible explanation for his symptoms include an increased PVC burden since his surgery on 1/13/2019. He was started on a 5 gram Amiodarone load (6/12 doses) and is responding well with significantly less PVC's.  Tolerating the medication well. At that time he will discuss the PVC ablation and be scheduled accordingly.  The patient would like to have it done sooner than later to minimize the amount of time on Amiodarone given it's side effect profile. In the meantime he will continue to recover from his recent hernia surgery.       follow-up appointment with Dr. Koo in 2-3 weeks. At that time he will discuss the PVC ablation and be scheduled accordingly.      Dr. Koo at 3:30pm on March 1st     Amio: 400 tonight at 6pm   Amio: 400 BID tomorrow   Amio: 200 Daily starting on Sat    PFT test outpatient   Surgery outpatient when  to restart Plavix [Dr. Ochoa] c/w Amiodarone load   follow-up appointment with Dr. Koo 3/1/19 at 3:30PM. At that time he will discuss the PVC ablation and be scheduled accordingly.    Amio: 400 tonight at 6pm   Amio: 400 BID tomorrow   Amio: 200 Daily starting on Sat

## 2019-02-05 NOTE — PROGRESS NOTE ADULT - PROBLEM SELECTOR PLAN 1
Improving on tele, c/w amio load per EP ( Discussed with Dr. Koo)   check cbc, bmp, a1c, flp, tsh, trend CE  EP consult appreciated  Amio load per EP for increased PVC burden since the surgery. Will hold coreg per EP  Echo ordered   possible ablation this admission or as outpatient  Discussed with Dr. Ly Improving on tele, c/w amio load per EP ( Discussed with Dr. Koo). Outpt Ablation  Amio load per EP for increased PVC burden since the surgery.   f/u plan for amio dosing upon dc

## 2019-02-05 NOTE — PROGRESS NOTE ADULT - SUBJECTIVE AND OBJECTIVE BOX
Patient feeling significantly better today.  States his palpitations have been drastically reduced since starting on the Amiodarone.  He denies shortness of breath, chest pain or lightheadedness.  No nausea with the medication.     Vital Signs Last 24 Hrs  T(C): 36.4 (05 Feb 2019 05:17), Max: 36.4 (04 Feb 2019 22:26)  T(F): 97.5 (05 Feb 2019 05:17), Max: 97.6 (05 Feb 2019 02:03)  HR: 64 (05 Feb 2019 18:38) (60 - 64)  BP: 118/68 (05 Feb 2019 18:38) (118/68 - 144/68)  BP(mean): --  RR: 18 (05 Feb 2019 18:38) (18 - 24)  SpO2: 97% (05 Feb 2019 18:38) (95% - 100%)      EKG  Telemetry:  Atrial pacing at 60 bpm  Ventricular sensing with occasional PVC  MEDICATIONS  (STANDING):  amiodarone    Tablet 400 milliGRAM(s) Oral every 8 hours  aspirin enteric coated 81 milliGRAM(s) Oral daily  clonazePAM Tablet 0.5 milliGRAM(s) Oral daily  clonazePAM Tablet 1.5 milliGRAM(s) Oral at bedtime  escitalopram 20 milliGRAM(s) Oral daily  finasteride 5 milliGRAM(s) Oral daily  losartan 25 milliGRAM(s) Oral every 12 hours  magnesium oxide 400 milliGRAM(s) Oral three times a day with meals  simvastatin 20 milliGRAM(s) Oral at bedtime  sodium chloride 0.9% lock flush 3 milliLiter(s) IV Push every 8 hours  tamsulosin 0.4 milliGRAM(s) Oral at bedtime    MEDICATIONS  (PRN):  clonazePAM Tablet 0.5 milliGRAM(s) Oral daily PRN Anxiety  zolpidem 5 milliGRAM(s) Oral at bedtime PRN Insomnia          Physical exam:   Gen- patient appears well. NAD  Resp- clear to auscultation.  No wheezing, rales or rhonchi  CV- S1 and S2 RRR  ABD-soft nontender + bowel sounds. + pelvic ecchymosis   EXT-no edema  Neuro  grossly intact                            12.0   6.47  )-----------( 262      ( 05 Feb 2019 06:20 )             36.6     PT/INR - ( 04 Feb 2019 15:40 )   PT: 12.8 SEC;   INR: 1.12          PTT - ( 04 Feb 2019 15:40 )  PTT:34.3 SEC  02-05    135  |  103  |  16  ----------------------------<  82  4.2   |  24  |  0.78    Ca    9.0      05 Feb 2019 06:20  Phos  2.5     02-05  Mg     1.9     02-05    TPro  6.6  /  Alb  3.7  /  TBili  1.2  /  DBili  x   /  AST  15  /  ALT  7   /  AlkPhos  79  02-04

## 2019-02-05 NOTE — DISCHARGE NOTE ADULT - CARE PLAN
Principal Discharge DX:	PVC (premature ventricular contraction)  Goal:	to  remain in NSR  Assessment and plan of treatment:	continue amiodarone   follow up with Dr. pacheco  Secondary Diagnosis:	Hypertension  Assessment and plan of treatment:	To maintain a normal blood pressure to prevent heart attack, stroke and renal failure.  Secondary Diagnosis:	Hyperlipidemia  Goal:	To maintain normal cholesterol levels to prevent stroke, coronary artery disease, peripheral vascular disease and heart attacks.  Assessment and plan of treatment:	Low fat diet, exercise daily and continue current medications. Follow up with primary care physician and cardiologist for management.  Secondary Diagnosis:	Benign prostatic hyperplasia  Assessment and plan of treatment:	continue present medication  Secondary Diagnosis:	Coronary artery disease  Assessment and plan of treatment:	continue aspirin for now - Principal Discharge DX:	PVC (premature ventricular contraction)  Goal:	to  remain in NSR  Assessment and plan of treatment:	continue amiodarone   follow up with Dr. pacheco  Secondary Diagnosis:	Hypertension  Assessment and plan of treatment:	To maintain a normal blood pressure to prevent heart attack, stroke and renal failure.  Secondary Diagnosis:	Hyperlipidemia  Goal:	To maintain normal cholesterol levels to prevent stroke, coronary artery disease, peripheral vascular disease and heart attacks.  Assessment and plan of treatment:	Low fat diet, exercise daily and continue current medications. Follow up with primary care physician and cardiologist for management.  Secondary Diagnosis:	Benign prostatic hyperplasia  Assessment and plan of treatment:	continue present medication  Secondary Diagnosis:	Coronary artery disease  Assessment and plan of treatment:	continue aspirin for now Principal Discharge DX:	PVC (premature ventricular contraction)  Goal:	to  remain in NSR  Assessment and plan of treatment:	PVC (premature ventricular contraction).  Plan: Improving on tele,   c/w Amio load per EP for increased PVC burden since the surgery. per EP.   Outpt Ablation  Outpt PFTs, discussed with pt.   A possible explanation for his symptoms include an increased PVC burden since his surgery on 1/13/2019. He was started on a 5 gram Amiodarone load (6/12 doses) and is responding well with significantly less PVC's.  Tolerating the medication well. At that time he will discuss the PVC ablation and be scheduled accordingly.  The patient would like to have it done sooner than later to minimize the amount of time on Amiodarone given it's side effect profile. In the meantime he will continue to recover from his recent hernia surgery.       follow-up appointment with Dr. Koo in 2-3 weeks. At that time he will discuss the PVC ablation and be scheduled accordingly.      Dr. Koo at 3:30pm on March 1st     Amio: 400 tonight at 6pm   Amio: 400 BID tomorrow   Amio: 200 Daily starting on Sat  Secondary Diagnosis:	Hypertension  Assessment and plan of treatment:	To maintain a normal blood pressure to prevent heart attack, stroke and renal failure.  Secondary Diagnosis:	Hyperlipidemia  Goal:	To maintain normal cholesterol levels to prevent stroke, coronary artery disease, peripheral vascular disease and heart attacks.  Assessment and plan of treatment:	Low fat diet, exercise daily and continue current medications. Follow up with primary care physician and cardiologist for management.  Secondary Diagnosis:	Benign prostatic hyperplasia  Assessment and plan of treatment:	continue present medication  Secondary Diagnosis:	Coronary artery disease  Assessment and plan of treatment:	continue aspirin for now Principal Discharge DX:	PVC (premature ventricular contraction)  Goal:	to  remain in NSR  Assessment and plan of treatment:	PVC (premature ventricular contraction).  Plan: Improving on tele,   c/w Amio load per EP for increased PVC burden since the surgery. per EP.   Outpt Ablation  Outpt PFTs, discussed with pt.   A possible explanation for his symptoms include an increased PVC burden since his surgery on 1/13/2019. He was started on a 5 gram Amiodarone load (6/12 doses) and is responding well with significantly less PVC's.  Tolerating the medication well. At that time he will discuss the PVC ablation and be scheduled accordingly.  The patient would like to have it done sooner than later to minimize the amount of time on Amiodarone given it's side effect profile. In the meantime he will continue to recover from his recent hernia surgery.       follow-up appointment with Dr. Koo in 2-3 weeks. At that time he will discuss the PVC ablation and be scheduled accordingly.      Dr. Koo at 3:30pm on March 1st     Amio: 400 tonight at 6pm   Amio: 400 BID tomorrow   Amio: 200 Daily starting on Sat    PFT test outpatient   Surgery outpatient when  to restart Plavix [Dr. Ochoa]  Secondary Diagnosis:	Hypertension  Assessment and plan of treatment:	To maintain a normal blood pressure to prevent heart attack, stroke and renal failure.  Secondary Diagnosis:	Hyperlipidemia  Goal:	To maintain normal cholesterol levels to prevent stroke, coronary artery disease, peripheral vascular disease and heart attacks.  Assessment and plan of treatment:	Low fat diet, exercise daily and continue current medications. Follow up with primary care physician and cardiologist for management.  Secondary Diagnosis:	Benign prostatic hyperplasia  Assessment and plan of treatment:	continue present medication  Secondary Diagnosis:	Coronary artery disease  Assessment and plan of treatment:	continue aspirin for now Principal Discharge DX:	PVC (premature ventricular contraction)  Goal:	to  remain in NSR  Assessment and plan of treatment:	c/w Amiodarone load   follow-up appointment with Dr. Koo 3/1/19 at 3:30PM. At that time he will discuss the PVC ablation and be scheduled accordingly.    Amio: 400 tonight at 6pm   Amio: 400 BID tomorrow   Amio: 200 Daily starting on Sat  Secondary Diagnosis:	Hypertension  Assessment and plan of treatment:	To maintain a normal blood pressure to prevent heart attack, stroke and renal failure.  Secondary Diagnosis:	Hyperlipidemia  Goal:	To maintain normal cholesterol levels to prevent stroke, coronary artery disease, peripheral vascular disease and heart attacks.  Assessment and plan of treatment:	Low fat diet, exercise daily and continue current medications. Follow up with primary care physician and cardiologist for management.  Secondary Diagnosis:	Benign prostatic hyperplasia  Assessment and plan of treatment:	continue present medication  Secondary Diagnosis:	Coronary artery disease  Assessment and plan of treatment:	continue aspirin for now

## 2019-02-05 NOTE — PROGRESS NOTE ADULT - PROBLEM SELECTOR PLAN 5
cont asa, statin  patient has not been taking plavix since the surgery. Will hold for now. cont cozaar  Trend BP

## 2019-02-05 NOTE — DISCHARGE NOTE ADULT - ADDITIONAL INSTRUCTIONS
Follow up Surgery  and Dr. Ochoa cardiology ( Dr. Ochoa) outpatient  regarding when  to restart Plavix  Please follow up with EP Dr. Koo 3/1/19 at 3:30PM  Please obtain outpatient pulmonary function tests

## 2019-02-05 NOTE — PROGRESS NOTE ADULT - PROBLEM SELECTOR PLAN 2
Monitor h/H closely.  patient with recent surgery and post op complication of femoral hematoma. Monitor the hematoma closely.   B12, folate, retic ordered in setting of macrocytosis given panic attacks and sever anxiety  klonopin restarted

## 2019-02-05 NOTE — DISCHARGE NOTE ADULT - MEDICATION SUMMARY - MEDICATIONS TO TAKE
I will START or STAY ON the medications listed below when I get home from the hospital:    finasteride 5 mg oral tablet  -- 1 tab(s) by mouth once a day  -- Indication: For Coronary artery disease    Ecotrin Adult Low Strength 81 mg oral delayed release tablet  -- 1 tab(s) by mouth once a day  -- Indication: For Coronary artery disease    Cozaar 25 mg oral tablet  -- 1 tab(s) by mouth 2 times a day  -- Indication: For Coronary artery disease    Flomax 0.4 mg oral capsule  -- 1 cap(s) by mouth once a day  -- Indication: For Benign prostatic hyperplasia    amiodarone 200 mg oral tablet  -- 1 tab(s) by mouth once a day    START 2/9 SATURDAY  -- Indication: For Coronary artery disease    amiodarone 200 mg oral tablet  -- 2 tab(s) by mouth 2 times a day     On 2/8  -- Indication: For Coronary artery disease    KlonoPIN 1 mg oral tablet  -- 1 tab(s) by mouth 3 times a day, As Needed    ISTOP Reference #: [id number]  90 tablets for a 30-day supply dispensed 12/22/2018  -- Indication: For Anxiety    KlonoPIN 1 mg oral tablet  -- 1.5 tab(s) by mouth once a day (at bedtime)  -- Indication: For Anxiety    KlonoPIN 0.5 mg oral tablet  -- 1 tab(s) by mouth once a day in the morning  -- Indication: For Anxiety    KlonoPIN 0.5 mg oral tablet  -- 1 tab(s) by mouth once a day, As Needed anxiety  -- Indication: For Anxiety    Lexapro 20 mg oral tablet  -- 1 tab(s) by mouth once a day  -- Indication: For Anxiety    Zocor 20 mg oral tablet  -- 1 tab(s) by mouth once a day (at bedtime)  -- Indication: For Coronary artery disease    Coreg 6.25 mg oral tablet  -- 1 tab(s) by mouth 2 times a day   -- It is very important that you take or use this exactly as directed.  Do not skip doses or discontinue unless directed by your doctor.  May cause drowsiness.  Alcohol may intensify this effect.  Use care when operating dangerous machinery.  Some non-prescription drugs may aggravate your condition.  Read all labels carefully.  If a warning appears, check with your doctor before taking.  Take with food or milk.    -- Indication: For Coronary artery disease I will START or STAY ON the medications listed below when I get home from the hospital:    finasteride 5 mg oral tablet  -- 1 tab(s) by mouth once a day  -- Indication: For Coronary artery disease    Ecotrin Adult Low Strength 81 mg oral delayed release tablet  -- 1 tab(s) by mouth once a day  -- Indication: For Coronary artery disease    Cozaar 25 mg oral tablet  -- 1 tab(s) by mouth 2 times a day  -- Indication: For Coronary artery disease    Flomax 0.4 mg oral capsule  -- 1 cap(s) by mouth once a day  -- Indication: For Benign prostatic hyperplasia    amiodarone 200 mg oral tablet  -- 1 tab(s) by mouth once a day    START 2/9 SATURDAY  -- Indication: For Coronary artery disease    amiodarone 200 mg oral tablet  -- 2 tab(s) by mouth 2 times a day     On 2/8  -- Indication: For Coronary artery disease    amiodarone 400 mg oral tablet  -- 1 tab(s) by mouth once a day     at 6pm tonight 2/7  -- Avoid grapefruit and grapefruit juice while taking this medication.  Avoid prolonged or excessive exposure to direct and/or artificial sunlight while taking this medication.  Do not take this drug if you are pregnant.  It is very important that you take or use this exactly as directed.  Do not skip doses or discontinue unless directed by your doctor.  May cause drowsiness or dizziness.    -- Indication: For Coronary artery disease    KlonoPIN 1 mg oral tablet  -- 1 tab(s) by mouth 3 times a day, As Needed    ISTOP Reference #: [id number]  90 tablets for a 30-day supply dispensed 12/22/2018  -- Indication: For Anxiety    KlonoPIN 1 mg oral tablet  -- 1.5 tab(s) by mouth once a day (at bedtime)  -- Indication: For Anxiety    KlonoPIN 0.5 mg oral tablet  -- 1 tab(s) by mouth once a day in the morning  -- Indication: For Anxiety    KlonoPIN 0.5 mg oral tablet  -- 1 tab(s) by mouth once a day, As Needed anxiety  -- Indication: For Anxiety    Lexapro 20 mg oral tablet  -- 1 tab(s) by mouth once a day  -- Indication: For Anxiety    Zocor 20 mg oral tablet  -- 1 tab(s) by mouth once a day (at bedtime)  -- Indication: For Coronary artery disease    Coreg 6.25 mg oral tablet  -- 1 tab(s) by mouth 2 times a day   -- It is very important that you take or use this exactly as directed.  Do not skip doses or discontinue unless directed by your doctor.  May cause drowsiness.  Alcohol may intensify this effect.  Use care when operating dangerous machinery.  Some non-prescription drugs may aggravate your condition.  Read all labels carefully.  If a warning appears, check with your doctor before taking.  Take with food or milk.    -- Indication: For Coronary artery disease

## 2019-02-05 NOTE — DISCHARGE NOTE ADULT - HOSPITAL COURSE
85yo male with past medical history of hypertension, hyperlipidemia,  coronary artery disease s/p MI 2006, s/p stents, ischemic cardiomyopathy, ventricular tachycardia s/p Medtronic dual chamber ICD 4/2007 with generator change 2 years ago, osteoarthritis s/p bilateral total knee replacements and recent incarcerated inguinal hernia s/p repair 1/13/2019 at [facility] complicated by hematoma and anemia sent by his cardiologist for an abnormal heart rate.    Problem/Plan - 1:  ·  Problem: PVC (premature ventricular contraction).  Plan: Improving on tele,   c/w Amio load per EP for increased PVC burden since the surgery. per EP.   Outpt Ablation  Outpt PFTs, discussed with pt.   A possible explanation for his symptoms include an increased PVC burden since his surgery on 1/13/2019. He was started on a 5 gram Amiodarone load (6/12 doses) and is responding well with significantly less PVC's.  Tolerating the medication well. At that time he will discuss the PVC ablation and be scheduled accordingly.  The patient would like to have it done sooner than later to minimize the amount of time on Amiodarone given it's side effect profile. In the meantime he will continue to recover from his recent hernia surgery.       follow-up appointment with Dr. Koo in 2-3 weeks. At that time he will discuss the PVC ablation and be scheduled accordingly.      Problem/Plan - 2:  ·  Problem: Anxiety.  Plan: improved  c/w klonopin.     Problem/Plan - 3:  ·  Problem: Anemia.  Plan: patient with recent surgery and post op complication of femoral hematoma.   Monitor h/H closely.  Monitor the hematoma closely.     Problem/Plan - 4:  ·  Problem: Hematoma.  Plan: patient with recent surgery and post op complication of femoral   outpt follow-up.     Problem/Plan - 5:  ·  Problem: Hypertension.  Plan: cont cozaar  Trend BP.     Problem/Plan - 6:  Problem: Coronary artery disease. Plan: cont asa, statin  patient has not been taking plavix since the surgery. Will hold for now until outpt follow up with surgery.    Problem/Plan - 7:  ·  Problem: Benign prostatic hyperplasia.  Plan: cont flomax and finasteride.     Problem/Plan - 8:  ·  Problem: Need for prophylactic measure.  Plan: LE stockings given the hematoma.  Monitor H/H closely.       2/6 ECHO: 49%   1. Mitral annular calcification, otherwise normal mitral valve. Mild mitral regurgitation.  2. Calcified trileaflet aortic valve with normal opening.  Grossly moderate aortic regurgitation.  3. Mild to moderate aortic root dilatation  (Ao: 4.6 cm at the sinuses of Valsalva).  Moderate dilatation of the ascending aorta (about  5.0 cm).  4. Mildly dilated left atrium.  LA volume index = 37 cc/m2.  5. Mild left ventricular enlargement.  6. Mild global left ventricular systolic dysfunction.  7. Mild diastolic dysfunction (Stage I).  8. Normal right ventricular size and function.  A device wire is noted in the right heart.  *** No previous Echo exam.    2/7: Patient stable for discharge as per Dr. Alvarado 83yo male with past medical history of hypertension, hyperlipidemia,  coronary artery disease s/p MI 2006, s/p stents, ischemic cardiomyopathy, ventricular tachycardia s/p Medtronic dual chamber ICD 4/2007 with generator change 2 years ago, osteoarthritis s/p bilateral total knee replacements and recent incarcerated inguinal hernia s/p repair 1/13/2019 at [facility] complicated by hematoma and anemia sent by his cardiologist for an abnormal heart rate.    Problem/Plan - 1:  ·  Problem: PVC (premature ventricular contraction).  Plan: Improving on tele,   c/w Amio load per EP for increased PVC burden since the surgery. per EP.   Outpt Ablation  Outpt PFTs, discussed with pt.   A possible explanation for his symptoms include an increased PVC burden since his surgery on 1/13/2019. He was started on a 5 gram Amiodarone load (6/12 doses) and is responding well with significantly less PVC's.  Tolerating the medication well. At that time he will discuss the PVC ablation and be scheduled accordingly.  The patient would like to have it done sooner than later to minimize the amount of time on Amiodarone given it's side effect profile. In the meantime he will continue to recover from his recent hernia surgery.       follow-up appointment with Dr. Koo in 2-3 weeks. At that time he will discuss the PVC ablation and be scheduled accordingly.      Dr. Koo at 3:30pm on March 1st     Amio: 400 tonight at 6pm   Amio: 400 BID tomorrow   Amio: 200 Daily starting on Sat    Problem/Plan - 2:  ·  Problem: Anxiety.  Plan: improved  c/w klonopin.     Problem/Plan - 3:  ·  Problem: Anemia.  Plan: patient with recent surgery and post op complication of femoral hematoma.   Monitor h/H closely.  Monitor the hematoma closely.     Problem/Plan - 4:  ·  Problem: Hematoma.  Plan: patient with recent surgery and post op complication of femoral   outpt follow-up.     Problem/Plan - 5:  ·  Problem: Hypertension.  Plan: cont cozaar  Trend BP.     Problem/Plan - 6:  Problem: Coronary artery disease. Plan: cont asa, statin  patient has not been taking plavix since the surgery. Will hold for now until outpt follow up with surgery.    Problem/Plan - 7:  ·  Problem: Benign prostatic hyperplasia.  Plan: cont flomax and finasteride.     Problem/Plan - 8:  ·  Problem: Need for prophylactic measure.  Plan: LE stockings given the hematoma.  Monitor H/H closely.       2/6 ECHO: 49%   1. Mitral annular calcification, otherwise normal mitral valve. Mild mitral regurgitation.  2. Calcified trileaflet aortic valve with normal opening.  Grossly moderate aortic regurgitation.  3. Mild to moderate aortic root dilatation  (Ao: 4.6 cm at the sinuses of Valsalva).  Moderate dilatation of the ascending aorta (about  5.0 cm).  4. Mildly dilated left atrium.  LA volume index = 37 cc/m2.  5. Mild left ventricular enlargement.  6. Mild global left ventricular systolic dysfunction.  7. Mild diastolic dysfunction (Stage I).  8. Normal right ventricular size and function.  A device wire is noted in the right heart.  *** No previous Echo exam.    2/7: Patient stable for discharge as per Dr. Alvarado 83yo male with past medical history of hypertension, hyperlipidemia,  coronary artery disease s/p MI 2006, s/p stents, ischemic cardiomyopathy, ventricular tachycardia s/p Medtronic dual chamber ICD 4/2007 with generator change 2 years ago, osteoarthritis s/p bilateral total knee replacements and recent incarcerated inguinal hernia s/p repair 1/13/2019 at [facility] complicated by hematoma and anemia sent by his cardiologist for an abnormal heart rate.    Problem/Plan - 1:  ·  Problem: PVC (premature ventricular contraction).  Plan: Improving on tele,   c/w Amio load per EP for increased PVC burden since the surgery. per EP.   Outpt Ablation  Outpt PFTs, discussed with pt.   A possible explanation for his symptoms include an increased PVC burden since his surgery on 1/13/2019. He was started on a 5 gram Amiodarone load (6/12 doses) and is responding well with significantly less PVC's.  Tolerating the medication well. At that time he will discuss the PVC ablation and be scheduled accordingly.  The patient would like to have it done sooner than later to minimize the amount of time on Amiodarone given it's side effect profile. In the meantime he will continue to recover from his recent hernia surgery.       follow-up appointment with Dr. Koo in 2-3 weeks. At that time he will discuss the PVC ablation and be scheduled accordingly.      Dr. Koo at 3:30pm on March 1st     Amio: 400 tonight at 6pm   Amio: 400 BID tomorrow   Amio: 200 Daily starting on Sat    PFT test outpatient   Surgery outpatient when  to restart Plavix [Dr. Ochoa]    Problem/Plan - 2:  ·  Problem: Anxiety.  Plan: improved  c/w klonopin.     Problem/Plan - 3:  ·  Problem: Anemia.  Plan: patient with recent surgery and post op complication of femoral hematoma.   Monitor h/H closely.  Monitor the hematoma closely.     Problem/Plan - 4:  ·  Problem: Hematoma.  Plan: patient with recent surgery and post op complication of femoral   outpt follow-up.     Problem/Plan - 5:  ·  Problem: Hypertension.  Plan: cont cozaar  Trend BP.     Problem/Plan - 6:  Problem: Coronary artery disease. Plan: cont asa, statin  patient has not been taking plavix since the surgery. Will hold for now until outpt follow up with surgery.    Problem/Plan - 7:  ·  Problem: Benign prostatic hyperplasia.  Plan: cont flomax and finasteride.     Problem/Plan - 8:  ·  Problem: Need for prophylactic measure.  Plan: LE stockings given the hematoma.  Monitor H/H closely.       2/6 ECHO: 49%   1. Mitral annular calcification, otherwise normal mitral valve. Mild mitral regurgitation.  2. Calcified trileaflet aortic valve with normal opening.  Grossly moderate aortic regurgitation.  3. Mild to moderate aortic root dilatation  (Ao: 4.6 cm at the sinuses of Valsalva).  Moderate dilatation of the ascending aorta (about  5.0 cm).  4. Mildly dilated left atrium.  LA volume index = 37 cc/m2.  5. Mild left ventricular enlargement.  6. Mild global left ventricular systolic dysfunction.  7. Mild diastolic dysfunction (Stage I).  8. Normal right ventricular size and function.  A device wire is noted in the right heart.  *** No previous Echo exam.    2/7: Patient stable for discharge as per Dr. Alvarado 85yo male with past medical history of hypertension, hyperlipidemia,  coronary artery disease s/p MI 2006, s/p stents, ischemic cardiomyopathy, ventricular tachycardia s/p Medtronic dual chamber ICD 4/2007 with generator change 2 years ago, osteoarthritis s/p bilateral total knee replacements and recent incarcerated inguinal hernia s/p repair 1/13/2019 at [facility] complicated by hematoma and anemia sent by his cardiologist for an abnormal heart rate.     Pt was found to have increased PVC burden which may be contributing to his symptoms. Pt was started amiodarone load with significant improvement   Per EP, Outpt Ablation, At that time he will discuss the PVC ablation and be scheduled accordingly.  The patient would like to have it done sooner than later to minimize the amount of time on Amiodarone given it's side effect profile. In the meantime he will continue to recover from his recent hernia surgery.     Outpt PFTs, was also discussed with pt given amiodarone use.       follow-up appointment with Dr. Koo in 2-3 weeks. At that time he will discuss the PVC ablation and be scheduled accordingly.    Pt advised to follow up Surgery  and Dr. Ochoa cardiology ( Dr. Ochoa) outpatient  regarding when  to restart Plavix since it was held outpt.  Dr. Koo at 3:30pm on March 1st     Amio: 400 tonight at 6pm   Amio: 400 BID tomorrow   Amio: 200 Daily starting on Sat

## 2019-02-05 NOTE — PROGRESS NOTE ADULT - ASSESSMENT
Patient is a 85yo male with past medical history of hypertension, hyperlipidemia,  coronary artery disease s/p MI 2006, s/p stents, ischemic cardiomyopathy, ventricular tachycardia s/p Medtronic dual chamber ICD 4/2007 with generator change 2 years ago, osteoarthritis s/p bilateral total knee replacements and recent incarcerated inguinal hernia s/p repair 1/13/2019 at [facility] complicated by hematoma and anemia sent by his cardiologist for an abnormal heart rate.  ICD interrogation shows normal sinus rhythm, PVC's with significantly increased PVC burden and intermittent bigeminy. No nonsustained VT or therapies.  No atrial arrhythmias.  Patient currently asymptomatic. Device sensing  and pacing well. Battery good. Electrolytes WNL and Hgb/HCT stable.    Impression: A possible explanation for his symptoms include an increased PVC burden since his surgery on 1/13/2019. He was started on Amiodarone load to 5 grams and is responding well with significantly less PVC's today.  We have discussed with the patient and his wife additional treatment strategies including a PVC ablation.  After reviewing the risks and benefits they have decided on medical management at this time with an ablation in the future when he feels more recovered from his hernia surgery.   Plan:  Amiodarone 5 gram load: 400mg po tid x 12 doses, then 200mg daily for maintenance.           TFT's with next blood draw.  Will need PFT's as an outpatient.           His lower rate limit on the device was increased to 60 bpm which will prevent medication induced bradycardia.

## 2019-02-05 NOTE — DISCHARGE NOTE ADULT - PATIENT PORTAL LINK FT
You can access the [facility] Patient Portal, offered by [facility], by registering with the following website: [email or web address]

## 2019-02-05 NOTE — DISCHARGE NOTE ADULT - MEDICATION SUMMARY - MEDICATIONS TO CHANGE
I will SWITCH the dose or number of times a day I take the medications listed below when I get home from the hospital:    Coreg 12.5 mg oral tablet  -- 1 tab(s) by mouth 2 times a day

## 2019-02-05 NOTE — DISCHARGE NOTE ADULT - CARE PROVIDER_API CALL
Nuno Koo (MD)  Cardiac Electrophysiology; Cardiovascular Disease; Internal Medicine  [address] [place] [address], [address] [address]  [place] [address]  Phone: [phone]  Fax: [phone]  Follow Up Time:     Jamin Ochoa)  Internal Medicine  [address] [facility], [address] [place] [address]  Phone: [phone]  Fax: [phone]  Follow Up Time:

## 2019-02-05 NOTE — PROGRESS NOTE ADULT - PROBLEM SELECTOR PLAN 3
patient with recent surgery and post op complication of femoral hematoma/ecchymosis. Per patient, hematoma has improved a lot. Not painful. patient with recent surgery and post op complication of femoral hematoma.   Monitor h/H closely.  Monitor the hematoma closely.

## 2019-02-05 NOTE — PROGRESS NOTE ADULT - ASSESSMENT
85yo male with past medical history of hypertension, hyperlipidemia,  coronary artery disease s/p MI 2006, s/p stents, ischemic cardiomyopathy, ventricular tachycardia s/p Medtronic dual chamber ICD 4/2007 with generator change 2 years ago, osteoarthritis s/p bilateral total knee replacements and recent incarcerated inguinal hernia s/p repair 1/13/2019 at [facility] complicated by hematoma and anemia sent by his cardiologist for an abnormal heart rate.

## 2019-02-05 NOTE — PROGRESS NOTE ADULT - SUBJECTIVE AND OBJECTIVE BOX
Patient is a 84y old  Male who presents with a chief complaint of abnormal rhythm from PMD's office (2019 20:24)      SUBJECTIVE / OVERNIGHT EVENTS: No CP or SOB.     MEDICATIONS  (STANDING):  amiodarone    Tablet 400 milliGRAM(s) Oral every 8 hours  aspirin enteric coated 81 milliGRAM(s) Oral daily  clonazePAM Tablet 0.5 milliGRAM(s) Oral daily  clonazePAM Tablet 1.5 milliGRAM(s) Oral at bedtime  escitalopram 20 milliGRAM(s) Oral daily  finasteride 5 milliGRAM(s) Oral daily  losartan 25 milliGRAM(s) Oral every 12 hours  magnesium oxide 400 milliGRAM(s) Oral three times a day with meals  simvastatin 20 milliGRAM(s) Oral at bedtime  sodium chloride 0.9% lock flush 3 milliLiter(s) IV Push every 8 hours  tamsulosin 0.4 milliGRAM(s) Oral at bedtime    MEDICATIONS  (PRN):  clonazePAM Tablet 0.5 milliGRAM(s) Oral daily PRN Anxiety  zolpidem 5 milliGRAM(s) Oral at bedtime PRN Insomnia      T(C): 36.4 (19 @ 05:17), Max: 36.9 (19 @ 15:12)  HR: 63 (19 @ 12:19) (46 - 63)  BP: 139/59 (19 @ 12:19) (115/40 - 144/68)  RR: 24 (19 @ 12:19) (16 - 24)  SpO2: 95% (19 @ 12:19) (95% - 100%)  CAPILLARY BLOOD GLUCOSE        I&O's Summary    2019 07:01  -  2019 07:00  --------------------------------------------------------  IN: 0 mL / OUT: 450 mL / NET: -450 mL        PHYSICAL EXAM:  GENERAL: NAD,   HEAD:  Normocephalic  EYES: EOMI,  conjunctiva and sclera clear  NECK: Supple,   CHEST/LUNG: Clear to auscultation bilaterally; No wheeze  HEART:  s1 s2 + Regular rate and rhythm;   ABDOMEN: Soft, Nontender, Nondistended; Bowel sounds present  EXTREMITIES:   No clubbing, cyanosis  NEURO: AAOx3      LABS:                        12.0   6.47  )-----------( 262      ( 2019 06:20 )             36.6     02-05    135  |  103  |  16  ----------------------------<  82  4.2   |  24  |  0.78    Ca    9.0      2019 06:20  Phos  2.5     02-05  Mg     1.9     02-05    TPro  6.6  /  Alb  3.7  /  TBili  1.2  /  DBili  x   /  AST  15  /  ALT  7   /  AlkPhos  79  02-04    PT/INR - ( 2019 15:40 )   PT: 12.8 SEC;   INR: 1.12          PTT - ( 2019 15:40 )  PTT:34.3 SEC          Tele: [facility] improving    Myles Alvarado MD  Hospitalist  P/ [phone] Patient is a 84y old  Male who presents with a chief complaint of abnormal rhythm from PMD's office (2019 20:24)      SUBJECTIVE / OVERNIGHT EVENTS: No CP or SOB.     MEDICATIONS  (STANDING):  amiodarone    Tablet 400 milliGRAM(s) Oral every 8 hours  aspirin enteric coated 81 milliGRAM(s) Oral daily  clonazePAM Tablet 0.5 milliGRAM(s) Oral daily  clonazePAM Tablet 1.5 milliGRAM(s) Oral at bedtime  escitalopram 20 milliGRAM(s) Oral daily  finasteride 5 milliGRAM(s) Oral daily  losartan 25 milliGRAM(s) Oral every 12 hours  magnesium oxide 400 milliGRAM(s) Oral three times a day with meals  simvastatin 20 milliGRAM(s) Oral at bedtime  sodium chloride 0.9% lock flush 3 milliLiter(s) IV Push every 8 hours  tamsulosin 0.4 milliGRAM(s) Oral at bedtime    MEDICATIONS  (PRN):  clonazePAM Tablet 0.5 milliGRAM(s) Oral daily PRN Anxiety  zolpidem 5 milliGRAM(s) Oral at bedtime PRN Insomnia      T(C): 36.4 (19 @ 05:17), Max: 36.9 (19 @ 15:12)  HR: 63 (19 @ 12:19) (46 - 63)  BP: 139/59 (19 @ 12:19) (115/40 - 144/68)  RR: 24 (19 @ 12:19) (16 - 24)  SpO2: 95% (19 @ 12:19) (95% - 100%)  CAPILLARY BLOOD GLUCOSE        I&O's Summary    2019 07:01  -  2019 07:00  --------------------------------------------------------  IN: 0 mL / OUT: 450 mL / NET: -450 mL        PHYSICAL EXAM:  GENERAL: NAD,   HEAD:  Normocephalic  EYES: EOMI,  conjunctiva and sclera clear  NECK: Supple,   CHEST/LUNG: Clear to auscultation bilaterally; No wheeze  HEART:  s1 s2 + Regular rate and rhythm;   ABDOMEN: Soft, Nontender, Nondistended; Bowel sounds present  EXTREMITIES:   No clubbing, cyanosis  NEURO: AAOx3  PSYCH: anxiety  SKIn: +L inguinal surgical scar, no induration or drainage, mild ecchymosis      LABS:                        12.0   6.47  )-----------( 262      ( 2019 06:20 )             36.6     02-05    135  |  103  |  16  ----------------------------<  82  4.2   |  24  |  0.78    Ca    9.0      2019 06:20  Phos  2.5     02-05  Mg     1.9     02-05    TPro  6.6  /  Alb  3.7  /  TBili  1.2  /  DBili  x   /  AST  15  /  ALT  7   /  AlkPhos  79  02-04    PT/INR - ( 2019 15:40 )   PT: 12.8 SEC;   INR: 1.12          PTT - ( 2019 15:40 )  PTT:34.3 SEC          Tele: [facility] rosa Alvarado MD  Hospitalist  P/ [phone]

## 2019-02-05 NOTE — PROGRESS NOTE ADULT - PROBLEM SELECTOR PLAN 6
cont flomax and finasteride cont asa, statin  patient has not been taking plavix since the surgery. Will hold for now.

## 2019-02-06 LAB
ANION GAP SERPL CALC-SCNC: 13 MMO/L — SIGNIFICANT CHANGE UP (ref 7–14)
BUN SERPL-MCNC: 15 MG/DL — SIGNIFICANT CHANGE UP (ref 7–23)
CALCIUM SERPL-MCNC: 8.7 MG/DL — SIGNIFICANT CHANGE UP (ref 8.4–10.5)
CHLORIDE SERPL-SCNC: 103 MMOL/L — SIGNIFICANT CHANGE UP (ref 98–107)
CO2 SERPL-SCNC: 21 MMOL/L — LOW (ref 22–31)
CREAT SERPL-MCNC: 0.76 MG/DL — SIGNIFICANT CHANGE UP (ref 0.5–1.3)
GLUCOSE SERPL-MCNC: 77 MG/DL — SIGNIFICANT CHANGE UP (ref 70–99)
HCT VFR BLD CALC: 38 % — LOW (ref 39–50)
HGB BLD-MCNC: 12.5 G/DL — LOW (ref 13–17)
MAGNESIUM SERPL-MCNC: 1.8 MG/DL — SIGNIFICANT CHANGE UP (ref 1.6–2.6)
MCHC RBC-ENTMCNC: 31.8 PG — SIGNIFICANT CHANGE UP (ref 27–34)
MCHC RBC-ENTMCNC: 32.9 % — SIGNIFICANT CHANGE UP (ref 32–36)
MCV RBC AUTO: 96.7 FL — SIGNIFICANT CHANGE UP (ref 80–100)
NRBC # FLD: 0 K/UL — LOW (ref 25–125)
PLATELET # BLD AUTO: 253 K/UL — SIGNIFICANT CHANGE UP (ref 150–400)
PMV BLD: 10.3 FL — SIGNIFICANT CHANGE UP (ref 7–13)
POTASSIUM SERPL-MCNC: 4.1 MMOL/L — SIGNIFICANT CHANGE UP (ref 3.5–5.3)
POTASSIUM SERPL-SCNC: 4.1 MMOL/L — SIGNIFICANT CHANGE UP (ref 3.5–5.3)
RBC # BLD: 3.93 M/UL — LOW (ref 4.2–5.8)
RBC # FLD: 14.3 % — SIGNIFICANT CHANGE UP (ref 10.3–14.5)
SODIUM SERPL-SCNC: 137 MMOL/L — SIGNIFICANT CHANGE UP (ref 135–145)
T3 SERPL-MCNC: 77.1 NG/DL — LOW (ref 80–200)
T3 SERPL-MCNC: 81.7 NG/DL — SIGNIFICANT CHANGE UP (ref 80–200)
WBC # BLD: 6.24 K/UL — SIGNIFICANT CHANGE UP (ref 3.8–10.5)
WBC # FLD AUTO: 6.24 K/UL — SIGNIFICANT CHANGE UP (ref 3.8–10.5)

## 2019-02-06 PROCEDURE — 93306 TTE W/DOPPLER COMPLETE: CPT | Mod: 26

## 2019-02-06 PROCEDURE — 99233 SBSQ HOSP IP/OBS HIGH 50: CPT

## 2019-02-06 PROCEDURE — 99232 SBSQ HOSP IP/OBS MODERATE 35: CPT

## 2019-02-06 RX ADMIN — AMIODARONE HYDROCHLORIDE 400 MILLIGRAM(S): 400 TABLET ORAL at 02:51

## 2019-02-06 RX ADMIN — TAMSULOSIN HYDROCHLORIDE 0.4 MILLIGRAM(S): 0.4 CAPSULE ORAL at 21:52

## 2019-02-06 RX ADMIN — MAGNESIUM OXIDE 400 MG ORAL TABLET 400 MILLIGRAM(S): 241.3 TABLET ORAL at 17:46

## 2019-02-06 RX ADMIN — Medication 1.5 MILLIGRAM(S): at 21:53

## 2019-02-06 RX ADMIN — SODIUM CHLORIDE 3 MILLILITER(S): 9 INJECTION INTRAMUSCULAR; INTRAVENOUS; SUBCUTANEOUS at 05:36

## 2019-02-06 RX ADMIN — Medication 81 MILLIGRAM(S): at 11:45

## 2019-02-06 RX ADMIN — FINASTERIDE 5 MILLIGRAM(S): 5 TABLET, FILM COATED ORAL at 11:45

## 2019-02-06 RX ADMIN — LOSARTAN POTASSIUM 25 MILLIGRAM(S): 100 TABLET, FILM COATED ORAL at 05:46

## 2019-02-06 RX ADMIN — AMIODARONE HYDROCHLORIDE 400 MILLIGRAM(S): 400 TABLET ORAL at 17:46

## 2019-02-06 RX ADMIN — SODIUM CHLORIDE 3 MILLILITER(S): 9 INJECTION INTRAMUSCULAR; INTRAVENOUS; SUBCUTANEOUS at 13:55

## 2019-02-06 RX ADMIN — SIMVASTATIN 20 MILLIGRAM(S): 20 TABLET, FILM COATED ORAL at 21:52

## 2019-02-06 RX ADMIN — MAGNESIUM OXIDE 400 MG ORAL TABLET 400 MILLIGRAM(S): 241.3 TABLET ORAL at 07:58

## 2019-02-06 RX ADMIN — SODIUM CHLORIDE 3 MILLILITER(S): 9 INJECTION INTRAMUSCULAR; INTRAVENOUS; SUBCUTANEOUS at 21:53

## 2019-02-06 RX ADMIN — LOSARTAN POTASSIUM 25 MILLIGRAM(S): 100 TABLET, FILM COATED ORAL at 17:46

## 2019-02-06 RX ADMIN — MAGNESIUM OXIDE 400 MG ORAL TABLET 400 MILLIGRAM(S): 241.3 TABLET ORAL at 11:45

## 2019-02-06 RX ADMIN — Medication 0.5 MILLIGRAM(S): at 11:45

## 2019-02-06 RX ADMIN — Medication 0.5 MILLIGRAM(S): at 05:46

## 2019-02-06 RX ADMIN — AMIODARONE HYDROCHLORIDE 400 MILLIGRAM(S): 400 TABLET ORAL at 10:23

## 2019-02-06 RX ADMIN — ESCITALOPRAM OXALATE 20 MILLIGRAM(S): 10 TABLET, FILM COATED ORAL at 11:45

## 2019-02-06 NOTE — PROGRESS NOTE ADULT - ASSESSMENT
83yo male with past medical history of hypertension, hyperlipidemia,  coronary artery disease s/p MI 2006, s/p stents, ischemic cardiomyopathy, ventricular tachycardia s/p Medtronic dual chamber ICD 4/2007 with generator change 2 years ago, osteoarthritis s/p bilateral total knee replacements and recent incarcerated inguinal hernia s/p repair 1/13/2019 at [facility] complicated by hematoma and anemia sent by his cardiologist for an abnormal heart rate.

## 2019-02-06 NOTE — PROGRESS NOTE ADULT - SUBJECTIVE AND OBJECTIVE BOX
Patient comfortable.  Resting in bed. Denies chest pain, shortness of breath, palpitations or dizziness.  No nausea or epigastric distress 2/2 Amiodarone.  Tolerating the medication well. Has received 6/12 400mg doses.       Vital Signs Last 24 Hrs  T(C): 36.7 (06 Feb 2019 13:15), Max: 36.7 (05 Feb 2019 21:59)  T(F): 98 (06 Feb 2019 13:15), Max: 98 (05 Feb 2019 21:59)  HR: 62 (06 Feb 2019 13:15) (60 - 64)  BP: 126/54 (06 Feb 2019 13:15) (118/68 - 134/53)  BP(mean): --  RR: 18 (06 Feb 2019 13:15) (17 - 19)  SpO2: 100% (06 Feb 2019 13:15) (97% - 100%)      EKG  Telemetry  Atrial pacing   Ventricular sensing.  PVC's but less frequent.    MEDICATIONS  (STANDING):  amiodarone    Tablet 400 milliGRAM(s) Oral every 8 hours  aspirin enteric coated 81 milliGRAM(s) Oral daily  clonazePAM Tablet 0.5 milliGRAM(s) Oral daily  clonazePAM Tablet 1.5 milliGRAM(s) Oral at bedtime  escitalopram 20 milliGRAM(s) Oral daily  finasteride 5 milliGRAM(s) Oral daily  losartan 25 milliGRAM(s) Oral every 12 hours  magnesium oxide 400 milliGRAM(s) Oral three times a day with meals  simvastatin 20 milliGRAM(s) Oral at bedtime  sodium chloride 0.9% lock flush 3 milliLiter(s) IV Push every 8 hours  tamsulosin 0.4 milliGRAM(s) Oral at bedtime    MEDICATIONS  (PRN):  clonazePAM Tablet 0.5 milliGRAM(s) Oral daily PRN Anxiety  zolpidem 5 milliGRAM(s) Oral at bedtime PRN Insomnia      Physical exam:   Gen- much more comfortable. Less anxious in the private room.  Able to rest/sleep.  Resp- clear to auscultation.  No wheezing, rales or rhonchi  CV- S1 and S2 RRR with occasional irregularity  ABD-soft nontender + bowel sounds.  Ecchymosis improving.  EXT- no edema NO calf tenderness. Groin incision healing well  Neuro- grossly nonfocal                            12.5   6.24  )-----------( 253      ( 06 Feb 2019 06:00 )             38.0     PT/INR - ( 04 Feb 2019 15:40 )   PT: 12.8 SEC;   INR: 1.12          PTT - ( 04 Feb 2019 15:40 )  PTT:34.3 SEC  02-06    137  |  103  |  15  ----------------------------<  77  4.1   |  21<L>  |  0.76    Ca    8.7      06 Feb 2019 06:00  Phos  2.5     02-05  Mg     1.8     02-06    TPro  6.6  /  Alb  3.7  /  TBili  1.2  /  DBili  x   /  AST  15  /  ALT  7   /  AlkPhos  79  02-04

## 2019-02-06 NOTE — PROGRESS NOTE ADULT - ASSESSMENT
Patient is a 83yo male with past medical history of hypertension, hyperlipidemia,  coronary artery disease s/p MI 2006, s/p stents, ischemic cardiomyopathy, ventricular tachycardia s/p Medtronic dual chamber ICD 4/2007 with generator change 2 years ago, osteoarthritis s/p bilateral total knee replacements and recent incarcerated inguinal hernia s/p repair 1/13/2019 at [facility] complicated by hematoma and anemia sent by his cardiologist for an abnormal heart rate.  ICD interrogation showed normal sinus rhythm, PVC's with significantly increased PVC burden and intermittent bigeminy. No nonsustained VT or therapies.  No atrial arrhythmias.  Patient currently asymptomatic. Device sensing  and pacing well. Battery good. Electrolytes WNL and Hgb/HCT stable.    Impression: A possible explanation for his symptoms include an increased PVC burden since his surgery on 1/13/2019. He was started on a 5 gram Amiodarone load (6/12 doses) and is responding well with significantly less PVC's.  Tolerating the medication well. Anticipate discharge tomorrow with a follow-up appointment with Dr. Koo in 2-3 weeks. At that time he will discuss the PVC ablation and be scheduled accordingly.  The patient would like to have it done sooner than later to minimize the amount of time on Amiodarone given it's side effect profile. In the meantime he will continue to recover from his recent hernia surgery.

## 2019-02-06 NOTE — PROGRESS NOTE ADULT - PROBLEM SELECTOR PLAN 6
cont asa, statin  patient has not been taking plavix since the surgery. Will hold for now until outpt follow up with surgery

## 2019-02-06 NOTE — PROGRESS NOTE ADULT - PROBLEM SELECTOR PLAN 3
patient with recent surgery and post op complication of femoral hematoma.   Monitor h/H closely.  Monitor the hematoma closely.

## 2019-02-06 NOTE — PROGRESS NOTE ADULT - SUBJECTIVE AND OBJECTIVE BOX
Patient is a 84y old  Male who presents with a chief complaint of abnormal rhythm from PMD's office (2019 13:30)      SUBJECTIVE / OVERNIGHT EVENTS: Improved anxiety and sleep. No CP or palpitations    MEDICATIONS  (STANDING):  amiodarone    Tablet 400 milliGRAM(s) Oral every 8 hours  aspirin enteric coated 81 milliGRAM(s) Oral daily  clonazePAM Tablet 0.5 milliGRAM(s) Oral daily  clonazePAM Tablet 1.5 milliGRAM(s) Oral at bedtime  escitalopram 20 milliGRAM(s) Oral daily  finasteride 5 milliGRAM(s) Oral daily  losartan 25 milliGRAM(s) Oral every 12 hours  magnesium oxide 400 milliGRAM(s) Oral three times a day with meals  simvastatin 20 milliGRAM(s) Oral at bedtime  sodium chloride 0.9% lock flush 3 milliLiter(s) IV Push every 8 hours  tamsulosin 0.4 milliGRAM(s) Oral at bedtime    MEDICATIONS  (PRN):  clonazePAM Tablet 0.5 milliGRAM(s) Oral daily PRN Anxiety  zolpidem 5 milliGRAM(s) Oral at bedtime PRN Insomnia      T(C): 36.7 (19 @ 13:15), Max: 36.7 (19 @ 21:59)  HR: 62 (19 @ 13:15) (60 - 64)  BP: 126/54 (19 @ 13:15) (118/68 - 134/53)  RR: 18 (19 @ 13:15) (17 - 19)  SpO2: 100% (19 @ 13:15) (97% - 100%)  CAPILLARY BLOOD GLUCOSE        I&O's Summary    2019 07:01  -  2019 07:00  --------------------------------------------------------  IN: 0 mL / OUT: 500 mL / NET: -500 mL        PHYSICAL EXAM:  GENERAL: NAD,   HEAD:  Normocephalic  EYES: EOMI,  conjunctiva and sclera clear  NECK: Supple,   CHEST/LUNG: Clear to auscultation bilaterally; No wheeze  HEART:  s1 s2 + Regular rate and rhythm;   ABDOMEN: Soft, Nontender, Nondistended; Bowel sounds present  EXTREMITIES:   No clubbing, cyanosis  NEURO: AAOx3  PSYCH: improved anxiety      LABS:                        12.5   6.24  )-----------( 253      ( 2019 06:00 )             38.0     02-06    137  |  103  |  15  ----------------------------<  77  4.1   |  21<L>  |  0.76    Ca    8.7      2019 06:00  Phos  2.5     02-05  Mg     1.8     02-06    TPro  6.6  /  Alb  3.7  /  TBili  1.2  /  DBili  x   /  AST  15  /  ALT  7   /  AlkPhos  79  02-04    PT/INR - ( 2019 15:40 )   PT: 12.8 SEC;   INR: 1.12          PTT - ( 2019 15:40 )  PTT:34.3 SEC            Care Discussed with Consultants/Other Providers: ALDO ( Olga)    Myles Alvarado MD  Hospitalist  P/ [phone]

## 2019-02-06 NOTE — PROGRESS NOTE ADULT - PROBLEM SELECTOR PLAN 1
Improving on tele,   c/w Amio load per EP for increased PVC burden since the surgery. per EP.   Outpt Ablation  Outpt PFTs, discussed with pt

## 2019-02-07 VITALS
DIASTOLIC BLOOD PRESSURE: 70 MMHG | RESPIRATION RATE: 18 BRPM | HEART RATE: 60 BPM | SYSTOLIC BLOOD PRESSURE: 125 MMHG | OXYGEN SATURATION: 100 % | TEMPERATURE: 98 F

## 2019-02-07 PROBLEM — I25.10 ATHEROSCLEROTIC HEART DISEASE OF NATIVE CORONARY ARTERY WITHOUT ANGINA PECTORIS: Chronic | Status: ACTIVE | Noted: 2019-02-04

## 2019-02-07 PROBLEM — I10 ESSENTIAL (PRIMARY) HYPERTENSION: Chronic | Status: ACTIVE | Noted: 2019-02-04

## 2019-02-07 PROBLEM — I49.9 CARDIAC ARRHYTHMIA, UNSPECIFIED: Chronic | Status: ACTIVE | Noted: 2019-02-04

## 2019-02-07 PROBLEM — N40.0 BENIGN PROSTATIC HYPERPLASIA WITHOUT LOWER URINARY TRACT SYMPTOMS: Chronic | Status: ACTIVE | Noted: 2019-02-04

## 2019-02-07 PROBLEM — E78.5 HYPERLIPIDEMIA, UNSPECIFIED: Chronic | Status: ACTIVE | Noted: 2019-02-04

## 2019-02-07 LAB
ANION GAP SERPL CALC-SCNC: 12 MMO/L — SIGNIFICANT CHANGE UP (ref 7–14)
BUN SERPL-MCNC: 14 MG/DL — SIGNIFICANT CHANGE UP (ref 7–23)
CALCIUM SERPL-MCNC: 8.8 MG/DL — SIGNIFICANT CHANGE UP (ref 8.4–10.5)
CHLORIDE SERPL-SCNC: 103 MMOL/L — SIGNIFICANT CHANGE UP (ref 98–107)
CO2 SERPL-SCNC: 21 MMOL/L — LOW (ref 22–31)
CREAT SERPL-MCNC: 0.81 MG/DL — SIGNIFICANT CHANGE UP (ref 0.5–1.3)
GLUCOSE SERPL-MCNC: 82 MG/DL — SIGNIFICANT CHANGE UP (ref 70–99)
HCT VFR BLD CALC: 38.2 % — LOW (ref 39–50)
HGB BLD-MCNC: 12.9 G/DL — LOW (ref 13–17)
MAGNESIUM SERPL-MCNC: 1.9 MG/DL — SIGNIFICANT CHANGE UP (ref 1.6–2.6)
MCHC RBC-ENTMCNC: 32.1 PG — SIGNIFICANT CHANGE UP (ref 27–34)
MCHC RBC-ENTMCNC: 33.8 % — SIGNIFICANT CHANGE UP (ref 32–36)
MCV RBC AUTO: 95 FL — SIGNIFICANT CHANGE UP (ref 80–100)
NRBC # FLD: 0 K/UL — LOW (ref 25–125)
PLATELET # BLD AUTO: 250 K/UL — SIGNIFICANT CHANGE UP (ref 150–400)
PMV BLD: 10.3 FL — SIGNIFICANT CHANGE UP (ref 7–13)
POTASSIUM SERPL-MCNC: 4.2 MMOL/L — SIGNIFICANT CHANGE UP (ref 3.5–5.3)
POTASSIUM SERPL-SCNC: 4.2 MMOL/L — SIGNIFICANT CHANGE UP (ref 3.5–5.3)
RBC # BLD: 4.02 M/UL — LOW (ref 4.2–5.8)
RBC # FLD: 14.4 % — SIGNIFICANT CHANGE UP (ref 10.3–14.5)
SODIUM SERPL-SCNC: 136 MMOL/L — SIGNIFICANT CHANGE UP (ref 135–145)
WBC # BLD: 6.75 K/UL — SIGNIFICANT CHANGE UP (ref 3.8–10.5)
WBC # FLD AUTO: 6.75 K/UL — SIGNIFICANT CHANGE UP (ref 3.8–10.5)

## 2019-02-07 PROCEDURE — 99239 HOSP IP/OBS DSCHRG MGMT >30: CPT

## 2019-02-07 RX ORDER — AMIODARONE HYDROCHLORIDE 400 MG/1
1 TABLET ORAL
Qty: 1 | Refills: 0
Start: 2019-02-07 | End: 2019-02-07

## 2019-02-07 RX ORDER — CARVEDILOL PHOSPHATE 80 MG/1
1 CAPSULE, EXTENDED RELEASE ORAL
Qty: 60 | Refills: 0
Start: 2019-02-07 | End: 2019-03-08

## 2019-02-07 RX ORDER — CARVEDILOL PHOSPHATE 80 MG/1
1 CAPSULE, EXTENDED RELEASE ORAL
Qty: 0 | Refills: 0 | COMMUNITY

## 2019-02-07 RX ORDER — AMIODARONE HYDROCHLORIDE 400 MG/1
1 TABLET ORAL
Qty: 30 | Refills: 0
Start: 2019-02-07 | End: 2019-03-08

## 2019-02-07 RX ORDER — AMIODARONE HYDROCHLORIDE 400 MG/1
2 TABLET ORAL
Qty: 4 | Refills: 0
Start: 2019-02-07 | End: 2019-02-07

## 2019-02-07 RX ADMIN — LOSARTAN POTASSIUM 25 MILLIGRAM(S): 100 TABLET, FILM COATED ORAL at 18:31

## 2019-02-07 RX ADMIN — Medication 81 MILLIGRAM(S): at 11:32

## 2019-02-07 RX ADMIN — AMIODARONE HYDROCHLORIDE 400 MILLIGRAM(S): 400 TABLET ORAL at 18:31

## 2019-02-07 RX ADMIN — FINASTERIDE 5 MILLIGRAM(S): 5 TABLET, FILM COATED ORAL at 11:32

## 2019-02-07 RX ADMIN — Medication 0.5 MILLIGRAM(S): at 11:32

## 2019-02-07 RX ADMIN — AMIODARONE HYDROCHLORIDE 400 MILLIGRAM(S): 400 TABLET ORAL at 11:32

## 2019-02-07 RX ADMIN — SODIUM CHLORIDE 3 MILLILITER(S): 9 INJECTION INTRAMUSCULAR; INTRAVENOUS; SUBCUTANEOUS at 18:31

## 2019-02-07 RX ADMIN — ESCITALOPRAM OXALATE 20 MILLIGRAM(S): 10 TABLET, FILM COATED ORAL at 11:32

## 2019-02-07 RX ADMIN — MAGNESIUM OXIDE 400 MG ORAL TABLET 400 MILLIGRAM(S): 241.3 TABLET ORAL at 08:42

## 2019-02-07 RX ADMIN — SODIUM CHLORIDE 3 MILLILITER(S): 9 INJECTION INTRAMUSCULAR; INTRAVENOUS; SUBCUTANEOUS at 05:52

## 2019-02-07 RX ADMIN — LOSARTAN POTASSIUM 25 MILLIGRAM(S): 100 TABLET, FILM COATED ORAL at 05:54

## 2019-02-07 RX ADMIN — AMIODARONE HYDROCHLORIDE 400 MILLIGRAM(S): 400 TABLET ORAL at 03:36

## 2019-02-07 NOTE — PROGRESS NOTE ADULT - ASSESSMENT
85yo male with past medical history of hypertension, hyperlipidemia,  coronary artery disease s/p MI 2006, s/p stents, ischemic cardiomyopathy, ventricular tachycardia s/p Medtronic dual chamber ICD 4/2007 with generator change 2 years ago, osteoarthritis s/p bilateral total knee replacements and recent incarcerated inguinal hernia s/p repair 1/13/2019 at [facility] complicated by hematoma and anemia sent by his cardiologist for an abnormal heart rate.

## 2019-02-07 NOTE — PROGRESS NOTE ADULT - ASSESSMENT
Patient is a 83yo male with past medical history of hypertension, hyperlipidemia,  coronary artery disease s/p MI 2006, s/p stents, ischemic cardiomyopathy, ventricular tachycardia s/p Medtronic dual chamber ICD 4/2007 with generator change 2 years ago, osteoarthritis s/p bilateral total knee replacements and recent incarcerated inguinal hernia s/p repair 1/13/2019 at [facility] complicated by hematoma and anemia sent by his cardiologist for an abnormal heart rate.  ICD interrogation showed normal sinus rhythm, PVC's with significantly increased PVC burden and intermittent bigeminy. No nonsustained VT or therapies.  No atrial arrhythmias.  Patient currently asymptomatic. Device sensing  and pacing well. Battery good. Electrolytes WNL and Hgb/HCT stable.    Impression: A possible explanation for his symptoms include an increased PVC burden since his surgery on 1/13/2019. He was started on a 5 gram Amiodarone load (9/12 doses) during this admission  and is responding well -  now only rare PVC's on telemetry.  Tolerating the Amiodarone well.    Plan:    Discharge today with a follow-up appointment with Dr. Koo on 3/1/2019 at 3:30pm.  At that time he will discuss the PVC ablation and be   scheduled accordingly.  The patient would like to have it done sooner than later to minimize the amount of time on Amiodarone given it's side effect profile. In the meantime he will continue to recover from his recent hernia surgery.                Restart Coreg but decrease dose to 6.125mg bid             Continue Amiodarone as follows:  400mg tonight at 6:00pm.                                                               400mg with breakfast and dinner tomorrow 2/8/2019.                                                                On Saturday 2/9 take Amiodarone 200mg daily for maintenance.             Call your surgeon or discuss  permission to restart Plavix.

## 2019-02-07 NOTE — PROGRESS NOTE ADULT - SUBJECTIVE AND OBJECTIVE BOX
Patient is a 84y old  Male who presents with a chief complaint of abnormal rhythm from PMD's office (2019 11:33)      SUBJECTIVE / OVERNIGHT EVENTS: No CP or SOB. No palpitations    MEDICATIONS  (STANDING):  amiodarone    Tablet 400 milliGRAM(s) Oral every 8 hours  aspirin enteric coated 81 milliGRAM(s) Oral daily  clonazePAM Tablet 0.5 milliGRAM(s) Oral daily  clonazePAM Tablet 1.5 milliGRAM(s) Oral at bedtime  escitalopram 20 milliGRAM(s) Oral daily  finasteride 5 milliGRAM(s) Oral daily  losartan 25 milliGRAM(s) Oral every 12 hours  magnesium oxide 400 milliGRAM(s) Oral three times a day with meals  simvastatin 20 milliGRAM(s) Oral at bedtime  sodium chloride 0.9% lock flush 3 milliLiter(s) IV Push every 8 hours  tamsulosin 0.4 milliGRAM(s) Oral at bedtime    MEDICATIONS  (PRN):  clonazePAM Tablet 0.5 milliGRAM(s) Oral daily PRN Anxiety  zolpidem 5 milliGRAM(s) Oral at bedtime PRN Insomnia      T(C): 36.7 (19 @ 11:29), Max: 37.1 (19 @ 21:15)  HR: 60 (19 @ 11:29) (59 - 62)  BP: 125/70 (19 @ 11:29) (125/70 - 155/74)  RR: 18 (19 @ 11:29) (18 - 18)  SpO2: 100% (19 @ 11:29) (100% - 100%)  CAPILLARY BLOOD GLUCOSE        I&O's Summary    2019 07:01  -  2019 12:34  --------------------------------------------------------  IN: 0 mL / OUT: 700 mL / NET: -700 mL        PHYSICAL EXAM:  GENERAL: NAD,   HEAD:  Normocephalic  EYES: EOMI,  conjunctiva and sclera clear  NECK: Supple,   CHEST/LUNG: Clear to auscultation bilaterally; No wheeze  HEART:  s1 s2 + Regular rate and rhythm;   ABDOMEN: Soft, Nontender, Nondistended; Bowel sounds present  EXTREMITIES:   No clubbing, cyanosis  NEURO: AAOx3      LABS:                        12.9   6.75  )-----------( 250      ( 2019 06:35 )             38.2     02-07    136  |  103  |  14  ----------------------------<  82  4.2   |  21<L>  |  0.81    Ca    8.8      2019 06:35  Mg     1.9     02-07              Care Discussed with Consultants/Other Providers: EP ( SAHIL Espinoza)    Myles Alvarado MD  Hospitalist  P/ [phone]

## 2019-02-07 NOTE — PROGRESS NOTE ADULT - PROBLEM SELECTOR PLAN 8
LE stockings given the hematoma.  Monitor H/H closely

## 2019-02-07 NOTE — PROGRESS NOTE ADULT - PROBLEM SELECTOR PLAN 1
Improving on tele,   c/w Amio load per EP, pt to complete loading dose outpt  EP will follow outpt  Outpt Ablation  Outpt PFTs, discussed with pt

## 2019-02-07 NOTE — PROGRESS NOTE ADULT - REASON FOR ADMISSION
abnormal rhythm from PMD's office

## 2019-02-07 NOTE — PROGRESS NOTE ADULT - SUBJECTIVE AND OBJECTIVE BOX
Patient feeling well. No palpitations, chest pain, shortness of breath or dizziness.  No nausea. Tolerating the Amiodarone well.    Vital Signs Last 24 Hrs  T(C): 36.7 (07 Feb 2019 11:29), Max: 37.1 (06 Feb 2019 21:15)  T(F): 98 (07 Feb 2019 11:29), Max: 98.7 (06 Feb 2019 21:15)  HR: 60 (07 Feb 2019 11:29) (59 - 62)  BP: 125/70 (07 Feb 2019 11:29) (125/70 - 155/74)  BP(mean): --  RR: 18 (07 Feb 2019 11:29) (18 - 18)  SpO2: 100% (07 Feb 2019 11:29) (100% - 100%)      EKG  Telemetry:  Atrial pacing ventricular sensed.  Rare PVC's.   MEDICATIONS  (STANDING):  amiodarone    Tablet 400 milliGRAM(s) Oral every 8 hours  aspirin enteric coated 81 milliGRAM(s) Oral daily  clonazePAM Tablet 0.5 milliGRAM(s) Oral daily  clonazePAM Tablet 1.5 milliGRAM(s) Oral at bedtime  escitalopram 20 milliGRAM(s) Oral daily  finasteride 5 milliGRAM(s) Oral daily  losartan 25 milliGRAM(s) Oral every 12 hours  magnesium oxide 400 milliGRAM(s) Oral three times a day with meals  simvastatin 20 milliGRAM(s) Oral at bedtime  sodium chloride 0.9% lock flush 3 milliLiter(s) IV Push every 8 hours  tamsulosin 0.4 milliGRAM(s) Oral at bedtime    MEDICATIONS  (PRN):  clonazePAM Tablet 0.5 milliGRAM(s) Oral daily PRN Anxiety  zolpidem 5 milliGRAM(s) Oral at bedtime PRN Insomnia          Physical exam:   Gen- looks well. Ambulating without difficulty. NAD  Resp- clear to auscultation.  No wheezing, rales or rhonchi  CV- S1 and S2 normal. RRR  ABD- soft nontender . Left inquinal incision healing well.  Pelvic  ecchymosis resolving.   EXT- no edema. No calf tenderness.  Neuro- grossly nonfocal                            12.9   6.75  )-----------( 250      ( 07 Feb 2019 06:35 )             38.2       02-07    136  |  103  |  14  ----------------------------<  82  4.2   |  21<L>  |  0.81    Ca    8.8      07 Feb 2019 06:35  Mg     1.9     02-07

## 2019-02-07 NOTE — PROGRESS NOTE ADULT - PROBLEM SELECTOR PLAN 3
patient with recent surgery and post op complication of femoral hematoma.   Monitor h/H closely.  Monitor the hematoma closely.  Pt advised to follow up with Surgery and Dr. Ochoa if and when plavix can be restarted. Plavix was stopped outpt

## 2019-02-14 RX ORDER — CLOPIDOGREL 75 MG/1
75 TABLET, FILM COATED ORAL DAILY
Qty: 90 | Refills: 0 | Status: DISCONTINUED | COMMUNITY
End: 2019-02-14

## 2019-02-20 ENCOUNTER — MEDICATION RENEWAL (OUTPATIENT)
Age: 84
End: 2019-02-20

## 2019-03-01 ENCOUNTER — APPOINTMENT (OUTPATIENT)
Dept: ELECTROPHYSIOLOGY | Facility: CLINIC | Age: 84
End: 2019-03-01
Payer: MEDICARE

## 2019-03-01 ENCOUNTER — NON-APPOINTMENT (OUTPATIENT)
Age: 84
End: 2019-03-01

## 2019-03-01 VITALS
DIASTOLIC BLOOD PRESSURE: 72 MMHG | WEIGHT: 166 LBS | HEIGHT: 72 IN | BODY MASS INDEX: 22.48 KG/M2 | SYSTOLIC BLOOD PRESSURE: 136 MMHG | OXYGEN SATURATION: 99 % | HEART RATE: 60 BPM

## 2019-03-01 PROCEDURE — 99214 OFFICE O/P EST MOD 30 MIN: CPT

## 2019-03-01 PROCEDURE — 93000 ELECTROCARDIOGRAM COMPLETE: CPT

## 2019-03-01 RX ORDER — TAMSULOSIN HYDROCHLORIDE 0.4 MG/1
0.4 CAPSULE ORAL
Qty: 90 | Refills: 3 | Status: DISCONTINUED | COMMUNITY
End: 2019-03-01

## 2019-03-01 RX ORDER — CLONAZEPAM 1 MG/1
1 TABLET ORAL 3 TIMES DAILY
Refills: 0 | Status: DISCONTINUED | COMMUNITY
End: 2019-03-01

## 2019-03-01 RX ORDER — MECLIZINE HYDROCHLORIDE 25 MG/1
25 TABLET ORAL 3 TIMES DAILY
Refills: 0 | Status: DISCONTINUED | COMMUNITY
End: 2019-03-01

## 2019-03-01 NOTE — REASON FOR VISIT
[Follow-Up - From Hospitalization] : follow-up of a recent hospitalization for [Discharge Date: ___] : Discharge Date: [unfilled] [FreeTextEntry1] : PVCs

## 2019-03-01 NOTE — PHYSICAL EXAM
[General Appearance - Well Developed] : well developed [Normal Appearance] : normal appearance [Well Groomed] : well groomed [General Appearance - Well Nourished] : well nourished [No Deformities] : no deformities [General Appearance - In No Acute Distress] : no acute distress [Normal Conjunctiva] : the conjunctiva exhibited no abnormalities [Eyelids - No Xanthelasma] : the eyelids demonstrated no xanthelasmas [Normal Oral Mucosa] : normal oral mucosa [No Oral Pallor] : no oral pallor [No Oral Cyanosis] : no oral cyanosis [Normal Jugular Venous A Waves Present] : normal jugular venous A waves present [Normal Jugular Venous V Waves Present] : normal jugular venous V waves present [No Jugular Venous Horton A Waves] : no jugular venous horton A waves [Heart Rate And Rhythm] : heart rate and rhythm were normal [Heart Sounds] : normal S1 and S2 [Murmurs] : no murmurs present [Respiration, Rhythm And Depth] : normal respiratory rhythm and effort [Exaggerated Use Of Accessory Muscles For Inspiration] : no accessory muscle use [Auscultation Breath Sounds / Voice Sounds] : lungs were clear to auscultation bilaterally [Abdomen Soft] : soft [Abdomen Tenderness] : non-tender [Abdomen Mass (___ Cm)] : no abdominal mass palpated [Abnormal Walk] : normal gait [Gait - Sufficient For Exercise Testing] : the gait was sufficient for exercise testing [Nail Clubbing] : no clubbing of the fingernails [Cyanosis, Localized] : no localized cyanosis [Petechial Hemorrhages (___cm)] : no petechial hemorrhages [Skin Color & Pigmentation] : normal skin color and pigmentation [] : no rash [No Venous Stasis] : no venous stasis [Skin Lesions] : no skin lesions [No Skin Ulcers] : no skin ulcer [No Xanthoma] : no  xanthoma was observed [Oriented To Time, Place, And Person] : oriented to person, place, and time [Affect] : the affect was normal [Mood] : the mood was normal [No Anxiety] : not feeling anxious

## 2019-03-01 NOTE — DISCUSSION/SUMMARY
[FreeTextEntry1] : In summary, Mendel Barnett is an 85y/o man with Hx of HTN, HLD, CAD s/p MI (2006) and stents, ICM/chronic systolic CHF s/p ICD placement, VT and PVCs who presents today for routine f/u. Was recently in the hospital for episodes of increased fatigue, palpitations, and dyspnea. EKG at that time showed him to be in bigeminy. On interrogation of his ICD, his PVC burden since 1/13/2019 almost doubled from the previous interrogation:  279 per hour,  now 591 per hour. No episodes of NSVT or atrial arrhythmias noted. Lower rate limit was increased at that time from 50 to 60 bpm in an attempt to suppress some of the PVC's and he was initiated on Amiodarone. Since that time, he has been feeling better with no recurrence of fatigue, palpitations, or dyspnea. Denies chest pain, palpitations, SOB, syncope or near syncope. EKG today NSR without evidence of PVCs. Discussed possibility of continued antiarrhythmic therapy vs PVC ablation. Risks, benefits, and alternatives discussed at length. Patient and spouse prefer to avoid long term use of Amiodarone and concerned about symptomatic PVC recurrence. Recommend holding Amiodarone for 1.5 months and undergoing PVC ablation. Risks, benefits, and alternatives to procedure discussed at length. Risks including that of bleeding, infection, stroke, and cardiac tamponade discussed and he verbalizes understanding of all.  Also discussed possibility of lowering dose of amiodarone to decrease risk of toxicity and perhaps maintain suppression of PVCs.  Patient will think about these options and return to clinic in 3 months on 100 mg of amiodarone per day. \par \par Sincerely,\par \par Nuno Koo MD\par

## 2019-03-01 NOTE — HISTORY OF PRESENT ILLNESS
[FreeTextEntry1] : Jamin Ochoa MD\par \par I saw Mendel Barnett on March 1, 2019. As you know, he is an 85y/o man with Hx of HTN, HLD, CAD s/p MI (2006) and stents, ICM/chronic systolic CHF s/p ICD placement, VT and PVCs who presents today for routine f/u. Was recently in the hospital for episodes of increased fatigue, palpitations, and dyspnea. EKG at that time showed him to be in bigeminy. On interrogation of his ICD, his PVC burden since 1/13/2019 almost doubled from the previous interrogation:  279 per hour,  now 591 per hour. No episodes of NSVT or atrial arrhythmias noted. Lower rate limit was increased at that time from 50 to 60 bpm in an attempt to suppress some of the PVC's and he was initiated on Amiodarone. Since that time, he has been feeling better with no recurrence of fatigue, palpitations, or dyspnea. Denies chest pain, palpitations, SOB, syncope or near syncope.

## 2019-03-11 ENCOUNTER — MEDICATION RENEWAL (OUTPATIENT)
Age: 84
End: 2019-03-11

## 2019-06-07 ENCOUNTER — APPOINTMENT (OUTPATIENT)
Dept: ELECTROPHYSIOLOGY | Facility: CLINIC | Age: 84
End: 2019-06-07
Payer: MEDICARE

## 2019-06-07 ENCOUNTER — NON-APPOINTMENT (OUTPATIENT)
Age: 84
End: 2019-06-07

## 2019-06-07 VITALS
HEART RATE: 60 BPM | HEIGHT: 72 IN | OXYGEN SATURATION: 99 % | WEIGHT: 166 LBS | RESPIRATION RATE: 16 BRPM | SYSTOLIC BLOOD PRESSURE: 138 MMHG | BODY MASS INDEX: 22.48 KG/M2 | DIASTOLIC BLOOD PRESSURE: 74 MMHG

## 2019-06-07 PROCEDURE — 93283 PRGRMG EVAL IMPLANTABLE DFB: CPT

## 2019-06-07 PROCEDURE — 99214 OFFICE O/P EST MOD 30 MIN: CPT

## 2019-06-07 PROCEDURE — 93000 ELECTROCARDIOGRAM COMPLETE: CPT | Mod: 59

## 2019-06-07 NOTE — PHYSICAL EXAM
[Well Groomed] : well groomed [Normal Appearance] : normal appearance [General Appearance - Well Developed] : well developed [General Appearance - Well Nourished] : well nourished [No Deformities] : no deformities [General Appearance - In No Acute Distress] : no acute distress [Normal Conjunctiva] : the conjunctiva exhibited no abnormalities [Normal Oral Mucosa] : normal oral mucosa [Eyelids - No Xanthelasma] : the eyelids demonstrated no xanthelasmas [No Oral Pallor] : no oral pallor [Normal Jugular Venous A Waves Present] : normal jugular venous A waves present [No Oral Cyanosis] : no oral cyanosis [No Jugular Venous Horton A Waves] : no jugular venous horton A waves [Normal Jugular Venous V Waves Present] : normal jugular venous V waves present [Auscultation Breath Sounds / Voice Sounds] : lungs were clear to auscultation bilaterally [Respiration, Rhythm And Depth] : normal respiratory rhythm and effort [Exaggerated Use Of Accessory Muscles For Inspiration] : no accessory muscle use [Heart Sounds] : normal S1 and S2 [Murmurs] : no murmurs present [Heart Rate And Rhythm] : heart rate and rhythm were normal [Abdomen Mass (___ Cm)] : no abdominal mass palpated [Abdomen Tenderness] : non-tender [Abdomen Soft] : soft [Abnormal Walk] : normal gait [Gait - Sufficient For Exercise Testing] : the gait was sufficient for exercise testing [Nail Clubbing] : no clubbing of the fingernails [Cyanosis, Localized] : no localized cyanosis [Petechial Hemorrhages (___cm)] : no petechial hemorrhages [] : no rash [Skin Color & Pigmentation] : normal skin color and pigmentation [No Skin Ulcers] : no skin ulcer [Skin Lesions] : no skin lesions [No Venous Stasis] : no venous stasis [Affect] : the affect was normal [No Xanthoma] : no  xanthoma was observed [Oriented To Time, Place, And Person] : oriented to person, place, and time [Mood] : the mood was normal [No Anxiety] : not feeling anxious

## 2019-06-07 NOTE — DISCUSSION/SUMMARY
[FreeTextEntry1] : In summary, Mendel Barnett is an 85y/o man with Hx of HTN, HLD, CAD s/p MI (2006) and stents, ICM/chronic systolic CHF s/p ICD placement, VT and PVCs who presents today for routine f/u. Was last seen in the hospital 2/2019 for episodes of increased fatigue, palpitations, and dyspnea. EKG at that time showed him to be in bigeminy. On interrogation of his ICD, his PVC burden since 1/13/2019 almost doubled from the previous interrogation:  279 per hour,  now 591 per hour. No episodes of NSVT or atrial arrhythmias noted. Lower rate limit was increased at that time from 50 to 60 bpm in an attempt to suppress some of the PVC's and he was initiated on Amiodarone. Since that time, he has been feeling better with no recurrence of fatigue, palpitations, or dyspnea. Denies chest pain, palpitations, SOB, syncope or near syncope. On last visit, discussed concern with long term Amiodarone use. Has now been taking 100mg daily. EKG today NSR with evidence of PVC. Discussed possibility of staying on low dose Amiodarone vs possible discontinuation and assessing for PVC recurrence and need for possible PVC ablation if frequency and symptoms recur. For now will stay on amiodarone since it worked well in suppressing PVCs and improved patient symptoms on just 100 mg per day.  We are concerned about risk of atheroemboli with ablation if we need to go retrograde aortic approach. Also discussed what to do about Plavix given the bleed after hernia surgery.  But will need name of stents placed in 2006 before rendering a decision. Patient has been off Plavix for several months now. Once we know which stents, will make decision with angiographer what to do about Plavix. \par \par Sincerely,\par \par Nuno Koo MD

## 2019-06-07 NOTE — HISTORY OF PRESENT ILLNESS
[FreeTextEntry1] : Jamin Ochoa MD\par \par Mendel Barnett is an 83y/o man with Hx of HTN, HLD, CAD s/p MI (2006) and stents, ICM/chronic systolic CHF s/p ICD placement, VT and PVCs who presents today for routine f/u. Was last seen in the hospital 2/2019 for episodes of increased fatigue, palpitations, and dyspnea. EKG at that time showed him to be in bigeminy. On interrogation of his ICD, his PVC burden since 1/13/2019 almost doubled from the previous interrogation:  279 per hour,  now 591 per hour. No episodes of NSVT or atrial arrhythmias noted. Lower rate limit was increased at that time from 50 to 60 bpm in an attempt to suppress some of the PVC's and he was initiated on Amiodarone. Since that time, he has been feeling better with no recurrence of fatigue, palpitations, or dyspnea. Denies chest pain, palpitations, SOB, syncope or near syncope. On last visit, discussed concern with long term Amiodarone use. Has now been taking 100mg daily.

## 2019-06-07 NOTE — REASON FOR VISIT
[Follow-Up - Clinic] : a clinic follow-up of [Discharge Date: ___] : Discharge Date: [unfilled] [FreeTextEntry1] : PVCs

## 2019-07-09 ENCOUNTER — APPOINTMENT (OUTPATIENT)
Dept: INTERNAL MEDICINE | Facility: CLINIC | Age: 84
End: 2019-07-09
Payer: MEDICARE

## 2019-07-09 VITALS — SYSTOLIC BLOOD PRESSURE: 119 MMHG | DIASTOLIC BLOOD PRESSURE: 62 MMHG | HEART RATE: 60 BPM

## 2019-07-09 PROCEDURE — 36415 COLL VENOUS BLD VENIPUNCTURE: CPT

## 2019-07-09 PROCEDURE — 99214 OFFICE O/P EST MOD 30 MIN: CPT | Mod: 25

## 2019-07-09 NOTE — PHYSICAL EXAM
[No Acute Distress] : no acute distress [Well Nourished] : well nourished [Normal Sclera/Conjunctiva] : normal sclera/conjunctiva [Well Developed] : well developed [Well-Appearing] : well-appearing [PERRL] : pupils equal round and reactive to light [EOMI] : extraocular movements intact [Normal Outer Ear/Nose] : the outer ears and nose were normal in appearance [No JVD] : no jugular venous distention [Normal Oropharynx] : the oropharynx was normal [Supple] : supple [Thyroid Normal, No Nodules] : the thyroid was normal and there were no nodules present [No Lymphadenopathy] : no lymphadenopathy [No Accessory Muscle Use] : no accessory muscle use [No Respiratory Distress] : no respiratory distress  [Clear to Auscultation] : lungs were clear to auscultation bilaterally [Normal Rate] : normal rate  [Regular Rhythm] : with a regular rhythm [No Murmur] : no murmur heard [Normal S1, S2] : normal S1 and S2 [No Abdominal Bruit] : a ~M bruit was not heard ~T in the abdomen [No Varicosities] : no varicosities [No Carotid Bruits] : no carotid bruits [No Edema] : there was no peripheral edema [Pedal Pulses Present] : the pedal pulses are present [No Palpable Aorta] : no palpable aorta [Soft] : abdomen soft [No Extremity Clubbing/Cyanosis] : no extremity clubbing/cyanosis [No Masses] : no abdominal mass palpated [Non-distended] : non-distended [Non Tender] : non-tender [No HSM] : no HSM [Normal Bowel Sounds] : normal bowel sounds [Normal Posterior Cervical Nodes] : no posterior cervical lymphadenopathy [Normal Anterior Cervical Nodes] : no anterior cervical lymphadenopathy [No CVA Tenderness] : no CVA  tenderness [No Spinal Tenderness] : no spinal tenderness [No Joint Swelling] : no joint swelling [No Rash] : no rash [Grossly Normal Strength/Tone] : grossly normal strength/tone [Coordination Grossly Intact] : coordination grossly intact [No Focal Deficits] : no focal deficits [Normal Gait] : normal gait [Deep Tendon Reflexes (DTR)] : deep tendon reflexes were 2+ and symmetric [Normal Affect] : the affect was normal [Normal Insight/Judgement] : insight and judgment were intact

## 2019-07-10 LAB
ALBUMIN SERPL ELPH-MCNC: 4.1 G/DL
ALP BLD-CCNC: 84 U/L
ALT SERPL-CCNC: 12 U/L
ANION GAP SERPL CALC-SCNC: 11 MMOL/L
AST SERPL-CCNC: 15 U/L
BASOPHILS # BLD AUTO: 0.05 K/UL
BASOPHILS NFR BLD AUTO: 0.7 %
BILIRUB SERPL-MCNC: 0.7 MG/DL
BUN SERPL-MCNC: 16 MG/DL
CALCIUM SERPL-MCNC: 8.9 MG/DL
CHLORIDE SERPL-SCNC: 103 MMOL/L
CHOLEST SERPL-MCNC: 106 MG/DL
CHOLEST/HDLC SERPL: 2.2 RATIO
CK SERPL-CCNC: 49 U/L
CO2 SERPL-SCNC: 24 MMOL/L
CREAT SERPL-MCNC: 0.97 MG/DL
EOSINOPHIL # BLD AUTO: 0.12 K/UL
EOSINOPHIL NFR BLD AUTO: 1.8 %
GGT SERPL-CCNC: 18 U/L
GLUCOSE SERPL-MCNC: 98 MG/DL
HCT VFR BLD CALC: 42.7 %
HDLC SERPL-MCNC: 48 MG/DL
HGB BLD-MCNC: 13.4 G/DL
IMM GRANULOCYTES NFR BLD AUTO: 0.4 %
LDLC SERPL CALC-MCNC: 42 MG/DL
LYMPHOCYTES # BLD AUTO: 1.26 K/UL
LYMPHOCYTES NFR BLD AUTO: 18.5 %
MAN DIFF?: NORMAL
MCHC RBC-ENTMCNC: 31.4 GM/DL
MCHC RBC-ENTMCNC: 32.5 PG
MCV RBC AUTO: 103.6 FL
MONOCYTES # BLD AUTO: 0.53 K/UL
MONOCYTES NFR BLD AUTO: 7.8 %
NEUTROPHILS # BLD AUTO: 4.82 K/UL
NEUTROPHILS NFR BLD AUTO: 70.8 %
PLATELET # BLD AUTO: 240 K/UL
POTASSIUM SERPL-SCNC: 4.6 MMOL/L
PROT SERPL-MCNC: 6.1 G/DL
RBC # BLD: 4.12 M/UL
RBC # FLD: 14.6 %
SODIUM SERPL-SCNC: 138 MMOL/L
T4 FREE SERPL-MCNC: 1.4 NG/DL
TRIGL SERPL-MCNC: 80 MG/DL
TSH SERPL-ACNC: 1.94 UIU/ML
WBC # FLD AUTO: 6.81 K/UL

## 2019-10-07 ENCOUNTER — APPOINTMENT (OUTPATIENT)
Dept: INTERNAL MEDICINE | Facility: CLINIC | Age: 84
End: 2019-10-07
Payer: MEDICARE

## 2019-11-15 ENCOUNTER — APPOINTMENT (OUTPATIENT)
Dept: INTERNAL MEDICINE | Facility: CLINIC | Age: 84
End: 2019-11-15
Payer: MEDICARE

## 2019-11-15 VITALS
DIASTOLIC BLOOD PRESSURE: 69 MMHG | HEIGHT: 72 IN | WEIGHT: 161 LBS | BODY MASS INDEX: 21.81 KG/M2 | HEART RATE: 60 BPM | RESPIRATION RATE: 16 BRPM | SYSTOLIC BLOOD PRESSURE: 116 MMHG | OXYGEN SATURATION: 96 %

## 2019-11-15 DIAGNOSIS — R09.89 OTHER SPECIFIED SYMPTOMS AND SIGNS INVOLVING THE CIRCULATORY AND RESPIRATORY SYSTEMS: ICD-10-CM

## 2019-11-15 DIAGNOSIS — J30.9 ALLERGIC RHINITIS, UNSPECIFIED: ICD-10-CM

## 2019-11-15 PROCEDURE — G0008: CPT

## 2019-11-15 PROCEDURE — 99213 OFFICE O/P EST LOW 20 MIN: CPT | Mod: 25

## 2019-11-15 PROCEDURE — 90662 IIV NO PRSV INCREASED AG IM: CPT

## 2019-11-15 NOTE — HISTORY OF PRESENT ILLNESS
[FreeTextEntry1] : F/U [de-identified] : No cp\par No SOB\par No palp\par \par Adherent to meds\par No apparent side effects

## 2019-11-15 NOTE — PHYSICAL EXAM
[No Acute Distress] : no acute distress [Well Nourished] : well nourished [Well Developed] : well developed [Well-Appearing] : well-appearing [Normal Sclera/Conjunctiva] : normal sclera/conjunctiva [PERRL] : pupils equal round and reactive to light [Normal Outer Ear/Nose] : the outer ears and nose were normal in appearance [EOMI] : extraocular movements intact [Normal Oropharynx] : the oropharynx was normal [Supple] : supple [No Lymphadenopathy] : no lymphadenopathy [No JVD] : no jugular venous distention [Thyroid Normal, No Nodules] : the thyroid was normal and there were no nodules present [No Accessory Muscle Use] : no accessory muscle use [No Respiratory Distress] : no respiratory distress  [Normal Rate] : normal rate  [Clear to Auscultation] : lungs were clear to auscultation bilaterally [Regular Rhythm] : with a regular rhythm [No Murmur] : no murmur heard [Normal S1, S2] : normal S1 and S2 [No Carotid Bruits] : no carotid bruits [No Abdominal Bruit] : a ~M bruit was not heard ~T in the abdomen [No Varicosities] : no varicosities [Pedal Pulses Present] : the pedal pulses are present [No Edema] : there was no peripheral edema [No Palpable Aorta] : no palpable aorta [Soft] : abdomen soft [No Extremity Clubbing/Cyanosis] : no extremity clubbing/cyanosis [No Masses] : no abdominal mass palpated [Non Tender] : non-tender [Non-distended] : non-distended [No HSM] : no HSM [Normal Anterior Cervical Nodes] : no anterior cervical lymphadenopathy [Normal Posterior Cervical Nodes] : no posterior cervical lymphadenopathy [Normal Bowel Sounds] : normal bowel sounds [No CVA Tenderness] : no CVA  tenderness [No Joint Swelling] : no joint swelling [No Spinal Tenderness] : no spinal tenderness [No Rash] : no rash [Grossly Normal Strength/Tone] : grossly normal strength/tone [No Focal Deficits] : no focal deficits [Coordination Grossly Intact] : coordination grossly intact [Deep Tendon Reflexes (DTR)] : deep tendon reflexes were 2+ and symmetric [Normal Affect] : the affect was normal [Normal Gait] : normal gait [Normal Insight/Judgement] : insight and judgment were intact

## 2019-11-26 NOTE — H&P ADULT - PROBLEM/PLAN-4
Last Office Visit   11/19/2019  Upcoming: Visit date not found    Last Refill - #120 on 10/29/19 with on refills    Please Advise   DISPLAY PLAN FREE TEXT

## 2019-12-05 ENCOUNTER — MEDICATION RENEWAL (OUTPATIENT)
Age: 84
End: 2019-12-05

## 2019-12-20 ENCOUNTER — APPOINTMENT (OUTPATIENT)
Dept: ELECTROPHYSIOLOGY | Facility: CLINIC | Age: 84
End: 2019-12-20

## 2020-03-09 ENCOUNTER — RX RENEWAL (OUTPATIENT)
Age: 85
End: 2020-03-09

## 2020-07-02 ENCOUNTER — APPOINTMENT (OUTPATIENT)
Dept: INTERNAL MEDICINE | Facility: CLINIC | Age: 85
End: 2020-07-02
Payer: MEDICARE

## 2020-07-02 ENCOUNTER — NON-APPOINTMENT (OUTPATIENT)
Age: 85
End: 2020-07-02

## 2020-07-02 VITALS
SYSTOLIC BLOOD PRESSURE: 110 MMHG | HEART RATE: 61 BPM | TEMPERATURE: 98.2 F | WEIGHT: 169 LBS | DIASTOLIC BLOOD PRESSURE: 68 MMHG | BODY MASS INDEX: 22.89 KG/M2 | HEIGHT: 72 IN

## 2020-07-02 PROCEDURE — 99214 OFFICE O/P EST MOD 30 MIN: CPT

## 2020-07-02 NOTE — PHYSICAL EXAM
[No Acute Distress] : no acute distress [No JVD] : no jugular venous distention [No Respiratory Distress] : no respiratory distress  [No Accessory Muscle Use] : no accessory muscle use [Clear to Auscultation] : lungs were clear to auscultation bilaterally [Normal Percussion] : the chest was normal to percussion [Normal Rate] : normal rate  [Regular Rhythm] : with a regular rhythm [Normal S1, S2] : normal S1 and S2 [No Murmur] : no murmur heard [No Edema] : there was no peripheral edema [No Palpable Aorta] : no palpable aorta

## 2020-07-02 NOTE — HISTORY OF PRESENT ILLNESS
[Spouse] : spouse [de-identified] : Feels OK. Active in the house but  formal exercise:  [place]. \par No SOB, chest pain/pressure. palpitations, edema.\par Appetite--OK. Weight stable. Sleeping OK, nocturia -2x. \par Takes clonazepam 2 tabs at night to sleep and a 1/2 tab in AM. \par Taking amiodarone 100 mg (1/2 of a 200 mg tablet). Amio was started to suppress his PVC burden. \par He is mildly forgetful but is oriented.  [FreeTextEntry1] : follow up

## 2020-09-11 LAB
ALBUMIN SERPL ELPH-MCNC: 3.9 G/DL
ALP BLD-CCNC: 89 U/L
ALT SERPL-CCNC: 13 U/L
ANION GAP SERPL CALC-SCNC: 10 MMOL/L
AST SERPL-CCNC: 17 U/L
BASOPHILS # BLD AUTO: 0.06 K/UL
BASOPHILS NFR BLD AUTO: 0.8 %
BILIRUB SERPL-MCNC: 0.4 MG/DL
BUN SERPL-MCNC: 18 MG/DL
CALCIUM SERPL-MCNC: 8.9 MG/DL
CHLORIDE SERPL-SCNC: 103 MMOL/L
CHOLEST SERPL-MCNC: 115 MG/DL
CHOLEST/HDLC SERPL: 2.6 RATIO
CK SERPL-CCNC: 34 U/L
CO2 SERPL-SCNC: 25 MMOL/L
CREAT SERPL-MCNC: 0.84 MG/DL
EOSINOPHIL # BLD AUTO: 0.18 K/UL
EOSINOPHIL NFR BLD AUTO: 2.4 %
FOLATE SERPL-MCNC: 9 NG/ML
GLUCOSE SERPL-MCNC: 97 MG/DL
HCT VFR BLD CALC: 42.4 %
HDLC SERPL-MCNC: 44 MG/DL
HGB BLD-MCNC: 13.8 G/DL
IMM GRANULOCYTES NFR BLD AUTO: 0.5 %
LDLC SERPL CALC-MCNC: 42 MG/DL
LYMPHOCYTES # BLD AUTO: 1.49 K/UL
LYMPHOCYTES NFR BLD AUTO: 19.6 %
MAGNESIUM SERPL-MCNC: 2 MG/DL
MAN DIFF?: NORMAL
MCHC RBC-ENTMCNC: 32.5 GM/DL
MCHC RBC-ENTMCNC: 32.8 PG
MCV RBC AUTO: 100.7 FL
MONOCYTES # BLD AUTO: 0.6 K/UL
MONOCYTES NFR BLD AUTO: 7.9 %
NEUTROPHILS # BLD AUTO: 5.23 K/UL
NEUTROPHILS NFR BLD AUTO: 68.8 %
PLATELET # BLD AUTO: 217 K/UL
POTASSIUM SERPL-SCNC: 4.2 MMOL/L
PROT SERPL-MCNC: 6.3 G/DL
RBC # BLD: 4.21 M/UL
RBC # FLD: 13.7 %
SODIUM SERPL-SCNC: 138 MMOL/L
T4 FREE SERPL-MCNC: 1.3 NG/DL
TRIGL SERPL-MCNC: 142 MG/DL
TSH SERPL-ACNC: 2.24 UIU/ML
VIT B12 SERPL-MCNC: 461 PG/ML
WBC # FLD AUTO: 7.6 K/UL

## 2020-10-01 ENCOUNTER — APPOINTMENT (OUTPATIENT)
Dept: INTERNAL MEDICINE | Facility: CLINIC | Age: 85
End: 2020-10-01
Payer: MEDICARE

## 2020-10-01 VITALS
SYSTOLIC BLOOD PRESSURE: 130 MMHG | HEART RATE: 64 BPM | TEMPERATURE: 97.6 F | BODY MASS INDEX: 21.94 KG/M2 | WEIGHT: 162 LBS | HEIGHT: 72 IN | DIASTOLIC BLOOD PRESSURE: 90 MMHG

## 2020-10-01 PROCEDURE — 90662 IIV NO PRSV INCREASED AG IM: CPT

## 2020-10-01 PROCEDURE — G0008: CPT

## 2020-10-01 PROCEDURE — 99213 OFFICE O/P EST LOW 20 MIN: CPT | Mod: 25

## 2020-10-01 NOTE — HISTORY OF PRESENT ILLNESS
[Spouse] : spouse [FreeTextEntry1] : follow up [de-identified] : Feels OK. Exercise [place] and active at home. Had shot in the neck for arthritis, didn't help much but going for physical therapy.\par Shortness of breath, chest pain--none. Edema--none, palpitations or fluttering-none. Still on amio 100 mg OD. \par No change in medications.\par Weight no changed. Diet sometimes eats cake. \par Being followed for VPC burden. When he has a lot of VPCs he is symptomatic even with short episodes. Frequency is low at present. \par

## 2020-10-01 NOTE — PHYSICAL EXAM
[No Acute Distress] : no acute distress [No Respiratory Distress] : no respiratory distress  [No Accessory Muscle Use] : no accessory muscle use [Clear to Auscultation] : lungs were clear to auscultation bilaterally [Normal Percussion] : the chest was normal to percussion [Normal Rate] : normal rate  [Regular Rhythm] : with a regular rhythm [Normal S1, S2] : normal S1 and S2 [No Murmur] : no murmur heard [No Edema] : there was no peripheral edema

## 2021-01-20 ENCOUNTER — APPOINTMENT (OUTPATIENT)
Dept: ELECTROPHYSIOLOGY | Facility: CLINIC | Age: 86
End: 2021-01-20
Payer: MEDICARE

## 2021-01-20 PROCEDURE — 93283 PRGRMG EVAL IMPLANTABLE DFB: CPT

## 2021-01-20 RX ORDER — AMIODARONE HYDROCHLORIDE 200 MG/1
200 TABLET ORAL
Qty: 90 | Refills: 3 | Status: DISCONTINUED | COMMUNITY
Start: 2019-02-14 | End: 2021-01-20

## 2021-03-02 ENCOUNTER — NON-APPOINTMENT (OUTPATIENT)
Age: 86
End: 2021-03-02

## 2021-04-13 ENCOUNTER — APPOINTMENT (OUTPATIENT)
Dept: INTERNAL MEDICINE | Facility: CLINIC | Age: 86
End: 2021-04-13
Payer: MEDICARE

## 2021-04-13 VITALS
HEART RATE: 64 BPM | DIASTOLIC BLOOD PRESSURE: 80 MMHG | TEMPERATURE: 97.8 F | WEIGHT: 164 LBS | BODY MASS INDEX: 22.21 KG/M2 | SYSTOLIC BLOOD PRESSURE: 110 MMHG | HEIGHT: 72 IN

## 2021-04-13 DIAGNOSIS — Z95.810 PRESENCE OF AUTOMATIC (IMPLANTABLE) CARDIAC DEFIBRILLATOR: ICD-10-CM

## 2021-04-13 DIAGNOSIS — Z23 ENCOUNTER FOR IMMUNIZATION: ICD-10-CM

## 2021-04-13 PROCEDURE — 36415 COLL VENOUS BLD VENIPUNCTURE: CPT

## 2021-04-13 PROCEDURE — 99214 OFFICE O/P EST MOD 30 MIN: CPT | Mod: 25

## 2021-04-13 RX ORDER — SIMVASTATIN 20 MG/1
20 TABLET, FILM COATED ORAL
Refills: 0 | Status: DISCONTINUED | COMMUNITY
End: 2021-04-13

## 2021-04-13 NOTE — PHYSICAL EXAM
[No Acute Distress] : no acute distress [Well Nourished] : well nourished [Well Developed] : well developed [Well-Appearing] : well-appearing [Normal Voice/Communication] : normal voice/communication [No JVD] : no jugular venous distention [No Respiratory Distress] : no respiratory distress  [No Accessory Muscle Use] : no accessory muscle use [Clear to Auscultation] : lungs were clear to auscultation bilaterally [Normal Percussion] : the chest was normal to percussion [Normal Rate] : normal rate  [Regular Rhythm] : with a regular rhythm [Normal S1, S2] : normal S1 and S2 [No Edema] : there was no peripheral edema [Normal Appearance] : normal in appearance [Soft] : abdomen soft

## 2021-04-13 NOTE — HISTORY OF PRESENT ILLNESS
[Spouse] : spouse [FreeTextEntry1] : follow up  [de-identified] : Feels OK. Riding bike with nice weather. Active at home still. Had COVID vaccination 4 weeks apart rather than 3 Pfizer.\par Chest pain , SOB, lightheaded or dizziness, edema--none. Palpitations-none. \par Confusion.cloudiness-none "His memory is terrible" according to his wife " but, "it's always been like that"\par Appetite is OK. Sleeping is OK. Trazaodone at hs. Also uses clonazepam. .

## 2021-04-14 LAB
ALBUMIN SERPL ELPH-MCNC: 4.1 G/DL
ALP BLD-CCNC: 88 U/L
ALT SERPL-CCNC: 14 U/L
ANION GAP SERPL CALC-SCNC: 7 MMOL/L
AST SERPL-CCNC: 18 U/L
BASOPHILS # BLD AUTO: 0.08 K/UL
BASOPHILS NFR BLD AUTO: 1 %
BILIRUB SERPL-MCNC: 0.5 MG/DL
BUN SERPL-MCNC: 18 MG/DL
CALCIUM SERPL-MCNC: 9.2 MG/DL
CHLORIDE SERPL-SCNC: 102 MMOL/L
CHOLEST SERPL-MCNC: 103 MG/DL
CK SERPL-CCNC: 32 U/L
CO2 SERPL-SCNC: 28 MMOL/L
CREAT SERPL-MCNC: 0.84 MG/DL
EOSINOPHIL # BLD AUTO: 0.16 K/UL
EOSINOPHIL NFR BLD AUTO: 2.1 %
GLUCOSE SERPL-MCNC: 89 MG/DL
HCT VFR BLD CALC: 44.3 %
HDLC SERPL-MCNC: 45 MG/DL
HGB BLD-MCNC: 14.3 G/DL
IMM GRANULOCYTES NFR BLD AUTO: 0.5 %
LDLC SERPL CALC-MCNC: 43 MG/DL
LYMPHOCYTES # BLD AUTO: 1.25 K/UL
LYMPHOCYTES NFR BLD AUTO: 16.2 %
MAGNESIUM SERPL-MCNC: 2 MG/DL
MAN DIFF?: NORMAL
MCHC RBC-ENTMCNC: 32.3 GM/DL
MCHC RBC-ENTMCNC: 32.8 PG
MCV RBC AUTO: 101.6 FL
MONOCYTES # BLD AUTO: 0.67 K/UL
MONOCYTES NFR BLD AUTO: 8.7 %
NEUTROPHILS # BLD AUTO: 5.51 K/UL
NEUTROPHILS NFR BLD AUTO: 71.5 %
NONHDLC SERPL-MCNC: 58 MG/DL
PLATELET # BLD AUTO: 277 K/UL
POTASSIUM SERPL-SCNC: 4.5 MMOL/L
PROT SERPL-MCNC: 6.6 G/DL
RBC # BLD: 4.36 M/UL
RBC # FLD: 13.9 %
SODIUM SERPL-SCNC: 138 MMOL/L
TRIGL SERPL-MCNC: 72 MG/DL
WBC # FLD AUTO: 7.71 K/UL

## 2021-07-21 ENCOUNTER — TRANSCRIPTION ENCOUNTER (OUTPATIENT)
Age: 86
End: 2021-07-21

## 2021-07-23 ENCOUNTER — TRANSCRIPTION ENCOUNTER (OUTPATIENT)
Age: 86
End: 2021-07-23

## 2021-10-19 NOTE — HISTORY OF PRESENT ILLNESS
[FreeTextEntry1] : F/U\par Seen by Hanane    issue of coated or not coated stent 2006   need for Plavix now?  [de-identified] : No incr in sob\par Not symptomatic from freq prematures\par AICD   has not fired\par Most recent EKG   no interval change\par  No

## 2021-12-30 ENCOUNTER — APPOINTMENT (OUTPATIENT)
Dept: CARDIOLOGY | Facility: CLINIC | Age: 86
End: 2021-12-30
Payer: MEDICARE

## 2021-12-30 VITALS
SYSTOLIC BLOOD PRESSURE: 120 MMHG | BODY MASS INDEX: 21.94 KG/M2 | WEIGHT: 162 LBS | HEIGHT: 72 IN | HEART RATE: 66 BPM | DIASTOLIC BLOOD PRESSURE: 80 MMHG

## 2021-12-30 PROCEDURE — 99205 OFFICE O/P NEW HI 60 MIN: CPT

## 2021-12-30 NOTE — DISCUSSION/SUMMARY
[FreeTextEntry1] : CAD s/p PCI: 2006. No symptoms. Cont asa, simvastatin\par \par HTN: BP great today, cont meds\par \par NSVT, PVC's: Longstanding hx. ?[place] repeat cath. Cont amiodarone 100mg, Coreg BID. Monitor TFT's, LFT's, PFT's, regular ophtho appointments \par \par ICD: Interrogations in office, will follow up \par \par Set up for repeat echo\par \par RV 3M, Check ICD then
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
## 2022-01-24 ENCOUNTER — APPOINTMENT (OUTPATIENT)
Dept: ELECTROPHYSIOLOGY | Facility: CLINIC | Age: 87
End: 2022-01-24

## 2022-02-07 ENCOUNTER — APPOINTMENT (OUTPATIENT)
Dept: INTERNAL MEDICINE | Facility: CLINIC | Age: 87
End: 2022-02-07

## 2022-03-07 ENCOUNTER — APPOINTMENT (OUTPATIENT)
Dept: INTERNAL MEDICINE | Facility: CLINIC | Age: 87
End: 2022-03-07
Payer: MEDICARE

## 2022-03-07 PROCEDURE — 93306 TTE W/DOPPLER COMPLETE: CPT

## 2022-03-14 ENCOUNTER — APPOINTMENT (OUTPATIENT)
Dept: CARDIOLOGY | Facility: CLINIC | Age: 87
End: 2022-03-14
Payer: MEDICARE

## 2022-03-14 VITALS
BODY MASS INDEX: 21.67 KG/M2 | WEIGHT: 160 LBS | OXYGEN SATURATION: 95 % | TEMPERATURE: 97.8 F | HEIGHT: 72 IN | SYSTOLIC BLOOD PRESSURE: 115 MMHG | HEART RATE: 61 BPM | DIASTOLIC BLOOD PRESSURE: 58 MMHG

## 2022-03-14 DIAGNOSIS — I25.2 OLD MYOCARDIAL INFARCTION: ICD-10-CM

## 2022-03-14 PROCEDURE — 99214 OFFICE O/P EST MOD 30 MIN: CPT
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
## 2022-03-14 NOTE — DISCUSSION/SUMMARY
[FreeTextEntry1] : CAD s/p PCI: 2006. No symptoms. Cont asa, simvastatin\par \par HTN: BP great today, cont meds\par \par NSVT, PVC's: Longstanding hx. ?[place] repeat cath. Cont amiodarone 100mg, Coreg BID. 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 Serial echos \par \par RV 3M

## 2022-03-17 ENCOUNTER — RX RENEWAL (OUTPATIENT)
Age: 87
End: 2022-03-17

## 2022-04-05 ENCOUNTER — APPOINTMENT (OUTPATIENT)
Dept: INTERNAL MEDICINE | Facility: CLINIC | Age: 87
End: 2022-04-05
Payer: MEDICARE

## 2022-04-05 VITALS
OXYGEN SATURATION: 98 % | DIASTOLIC BLOOD PRESSURE: 72 MMHG | HEART RATE: 87 BPM | SYSTOLIC BLOOD PRESSURE: 136 MMHG | BODY MASS INDEX: 22.89 KG/M2 | HEIGHT: 72 IN | WEIGHT: 169 LBS | TEMPERATURE: 98 F

## 2022-04-05 DIAGNOSIS — J32.9 CHRONIC SINUSITIS, UNSPECIFIED: ICD-10-CM

## 2022-04-05 DIAGNOSIS — D75.89 OTHER SPECIFIED DISEASES OF BLOOD AND BLOOD-FORMING ORGANS: ICD-10-CM

## 2022-04-05 DIAGNOSIS — N40.1 BENIGN PROSTATIC HYPERPLASIA WITH LOWER URINARY TRACT SYMPMS: ICD-10-CM

## 2022-04-05 PROCEDURE — 36415 COLL VENOUS BLD VENIPUNCTURE: CPT

## 2022-04-05 PROCEDURE — 99215 OFFICE O/P EST HI 40 MIN: CPT | Mod: 25

## 2022-04-05 RX ORDER — AMOXICILLIN 500 MG/1
500 CAPSULE ORAL
Qty: 28 | Refills: 0 | Status: COMPLETED | COMMUNITY
Start: 2021-07-07

## 2022-04-05 RX ORDER — METHYLPREDNISOLONE 4 MG/1
4 TABLET ORAL
Qty: 1 | Refills: 0 | Status: COMPLETED | COMMUNITY
Start: 2022-04-05 | End: 2022-04-11

## 2022-04-05 RX ORDER — TAMSULOSIN HYDROCHLORIDE 0.4 MG/1
0.4 CAPSULE ORAL
Qty: 90 | Refills: 1 | Status: DISCONTINUED | COMMUNITY
Start: 2019-01-31 | End: 2022-04-04

## 2022-04-05 NOTE — PLAN
[FreeTextEntry1] : - Will obtain CT of sinuses prior to starting medrol dose pack\par - Continue with all other treatments\par  - Labs done in office\par - Further management pending lab results

## 2022-04-05 NOTE — HEALTH RISK ASSESSMENT
[0] : 2) Feeling down, depressed, or hopeless: Not at all (0) [PHQ-2 Negative - No further assessment needed] : PHQ-2 Negative - No further assessment needed [[id number]] : 0

## 2022-04-05 NOTE — ASSESSMENT
[FreeTextEntry1] : 87 y.o. man with multiple medical comorbidities now with ongoing chronic rhinorrhea

## 2022-04-05 NOTE — PHYSICAL EXAM
[No Acute Distress] : no acute distress [Well Nourished] : well nourished [Well Developed] : well developed [Well-Appearing] : well-appearing [No Lymphadenopathy] : no lymphadenopathy [Supple] : supple [No Respiratory Distress] : no respiratory distress  [No Accessory Muscle Use] : no accessory muscle use [Clear to Auscultation] : lungs were clear to auscultation bilaterally [Normal Rate] : normal rate  [Regular Rhythm] : with a regular rhythm [Normal S1, S2] : normal S1 and S2 [No Murmur] : no murmur heard [No Edema] : there was no peripheral edema [Speech Grossly Normal] : speech grossly normal [Memory Grossly Normal] : memory grossly normal [Normal Affect] : the affect was normal [Alert and Oriented x3] : oriented to person, place, and time [Normal Mood] : the mood was normal [Normal Insight/Judgement] : insight and judgment were intact [de-identified] : Patient wearing protective face mask. Non-tender b/l frontal, maxillary, and ethmoid sinus

## 2022-04-05 NOTE — HISTORY OF PRESENT ILLNESS
[Family Member] : family member [FreeTextEntry1] : Follow up for HTN  [de-identified] : Patient presents for follow up for his chronic medical conditions. He reports compliance with all prescribed medical therapy and dietary restrictions. He reports rhinorrhea of several years in duration. He states that he was seen by ENT and treated with conservative management which has not helped to improve his symptoms.

## 2022-04-07 LAB
ALBUMIN SERPL ELPH-MCNC: 4.1 G/DL
ALP BLD-CCNC: 84 U/L
ALT SERPL-CCNC: 11 U/L
ANION GAP SERPL CALC-SCNC: 11 MMOL/L
AST SERPL-CCNC: 17 U/L
BASOPHILS # BLD AUTO: 0.03 K/UL
BASOPHILS NFR BLD AUTO: 0.5 %
BILIRUB SERPL-MCNC: 0.8 MG/DL
BUN SERPL-MCNC: 23 MG/DL
CALCIUM SERPL-MCNC: 9 MG/DL
CHLORIDE SERPL-SCNC: 103 MMOL/L
CHOLEST SERPL-MCNC: 135 MG/DL
CK SERPL-CCNC: 38 U/L
CO2 SERPL-SCNC: 24 MMOL/L
CREAT SERPL-MCNC: 0.92 MG/DL
EGFR: 81 ML/MIN/1.73M2
EOSINOPHIL # BLD AUTO: 0.23 K/UL
EOSINOPHIL NFR BLD AUTO: 3.5 %
GLUCOSE SERPL-MCNC: 85 MG/DL
HCT VFR BLD CALC: 41.4 %
HDLC SERPL-MCNC: 56 MG/DL
HGB BLD-MCNC: 13.5 G/DL
IMM GRANULOCYTES NFR BLD AUTO: 0.5 %
LDLC SERPL CALC-MCNC: 63 MG/DL
LYMPHOCYTES # BLD AUTO: 1.3 K/UL
LYMPHOCYTES NFR BLD AUTO: 19.9 %
MAN DIFF?: NORMAL
MCHC RBC-ENTMCNC: 32.6 GM/DL
MCHC RBC-ENTMCNC: 32.6 PG
MCV RBC AUTO: 100 FL
MONOCYTES # BLD AUTO: 0.68 K/UL
MONOCYTES NFR BLD AUTO: 10.4 %
NEUTROPHILS # BLD AUTO: 4.27 K/UL
NEUTROPHILS NFR BLD AUTO: 65.2 %
NONHDLC SERPL-MCNC: 79 MG/DL
PLATELET # BLD AUTO: 234 K/UL
POTASSIUM SERPL-SCNC: 4.5 MMOL/L
PROT SERPL-MCNC: 6.6 G/DL
PSA FREE FLD-MCNC: 20 %
PSA FREE SERPL-MCNC: 0.18 NG/ML
PSA SERPL-MCNC: 0.92 NG/ML
RBC # BLD: 4.14 M/UL
RBC # FLD: 14.2 %
SODIUM SERPL-SCNC: 138 MMOL/L
TRIGL SERPL-MCNC: 81 MG/DL
WBC # FLD AUTO: 6.54 K/UL

## 2022-06-09 ENCOUNTER — APPOINTMENT (OUTPATIENT)
Dept: CARDIOLOGY | Facility: CLINIC | Age: 87
End: 2022-06-09

## 2022-11-02 ENCOUNTER — APPOINTMENT (OUTPATIENT)
Dept: ELECTROPHYSIOLOGY | Facility: CLINIC | Age: 87
End: 2022-11-02

## 2022-11-07 ENCOUNTER — APPOINTMENT (OUTPATIENT)
Dept: ELECTROPHYSIOLOGY | Facility: CLINIC | Age: 87
End: 2022-11-07

## 2022-11-07 PROCEDURE — 93283 PRGRMG EVAL IMPLANTABLE DFB: CPT

## 2022-12-01 ENCOUNTER — NON-APPOINTMENT (OUTPATIENT)
Age: 87
End: 2022-12-01

## 2022-12-01 ENCOUNTER — APPOINTMENT (OUTPATIENT)
Dept: INTERNAL MEDICINE | Facility: CLINIC | Age: 87
End: 2022-12-01

## 2022-12-01 VITALS
DIASTOLIC BLOOD PRESSURE: 66 MMHG | HEART RATE: 61 BPM | SYSTOLIC BLOOD PRESSURE: 116 MMHG | HEIGHT: 65 IN | TEMPERATURE: 97.6 F | BODY MASS INDEX: 26.82 KG/M2 | OXYGEN SATURATION: 96 % | WEIGHT: 161 LBS

## 2022-12-01 VITALS — DIASTOLIC BLOOD PRESSURE: 70 MMHG | SYSTOLIC BLOOD PRESSURE: 100 MMHG

## 2022-12-01 DIAGNOSIS — H02.109 UNSPECIFIED ECTROPION OF UNSPECIFIED EYE, UNSPECIFIED EYELID: ICD-10-CM

## 2022-12-01 DIAGNOSIS — N40.0 BENIGN PROSTATIC HYPERPLASIA WITHOUT LOWER URINARY TRACT SYMPMS: ICD-10-CM

## 2022-12-01 DIAGNOSIS — Z01.818 ENCOUNTER FOR OTHER PREPROCEDURAL EXAMINATION: ICD-10-CM

## 2022-12-01 DIAGNOSIS — Z82.49 FAMILY HISTORY OF ISCHEMIC HEART DISEASE AND OTHER DISEASES OF THE CIRCULATORY SYSTEM: ICD-10-CM

## 2022-12-01 PROCEDURE — 93000 ELECTROCARDIOGRAM COMPLETE: CPT

## 2022-12-01 PROCEDURE — 36415 COLL VENOUS BLD VENIPUNCTURE: CPT

## 2022-12-01 PROCEDURE — 99214 OFFICE O/P EST MOD 30 MIN: CPT | Mod: 25

## 2022-12-02 ENCOUNTER — TRANSCRIPTION ENCOUNTER (OUTPATIENT)
Age: 87
End: 2022-12-02

## 2022-12-06 ENCOUNTER — APPOINTMENT (OUTPATIENT)
Dept: CARDIOLOGY | Facility: CLINIC | Age: 87
End: 2022-12-06

## 2022-12-06 VITALS — SYSTOLIC BLOOD PRESSURE: 130 MMHG | DIASTOLIC BLOOD PRESSURE: 60 MMHG

## 2022-12-06 VITALS
BODY MASS INDEX: 26.29 KG/M2 | HEART RATE: 56 BPM | TEMPERATURE: 97.9 F | OXYGEN SATURATION: 98 % | DIASTOLIC BLOOD PRESSURE: 62 MMHG | SYSTOLIC BLOOD PRESSURE: 142 MMHG | WEIGHT: 158 LBS

## 2022-12-06 DIAGNOSIS — E78.00 PURE HYPERCHOLESTEROLEMIA, UNSPECIFIED: ICD-10-CM

## 2022-12-06 PROCEDURE — 99214 OFFICE O/P EST MOD 30 MIN: CPT

## 2022-12-06 NOTE — DISCUSSION/SUMMARY
[FreeTextEntry1] : CAD s/p PCI: 2006. No symptoms. Cont asa, simvastatin\par \par HTN: BP good today, cont meds\par \par NSVT, PVC's: 2 NSVT episodes over the last 2 years. Longstanding hx. Hold off on further ischemic testing given lack of symptoms. Cont amiodarone 100mg, Coreg BID. 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 Serial echos/CT scans. Consider CT Sx eval but can hold off given stablity and age. BP control, avoid heavy lifting \par \par RV 3-4M\par \par Recent echo within normal limits. This patient is able to perform >4 METS of activity without limitation. No evidence of ongoing ischemia, arrhythmia (rare NSVT), valvular heart disease or decompensated heart failure.  This patient is optimized from a cardiac standpoint for this low risk surgery.  No further cardiac testing is necessary prior to surgery. Given stent hx, would not stop aspirin, would continue through the perioperative period. \par \par \par

## 2022-12-06 NOTE — HISTORY OF PRESENT ILLNESS
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 Pending eyelid surgery\par \par In general feeling well\par Pending eyelid surgery- was told to stop aspirin \par Noted with 5.0 cm aorta on last TTE- 4.8 in 2014\par He denies CP, dyspnea, palpitations\par Recent ICD check showing 1 VT episode in Feb 2021 terminated via 1 ATP, 22 beats of VT in May 2022 self terminated\par He does have rare episodes of syncope which usually occur with his anxiety. Neuro thought possible seizures, no correlation with arrhythmia on the monitor. \par Very active around the house, no symptoms \par \par Never smoker. 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

## 2022-12-08 LAB
ALBUMIN SERPL ELPH-MCNC: 3.6 G/DL
ALP BLD-CCNC: 78 U/L
ALT SERPL-CCNC: 10 U/L
ANION GAP SERPL CALC-SCNC: 8 MMOL/L
AST SERPL-CCNC: 14 U/L
BASOPHILS # BLD AUTO: 0.04 K/UL
BASOPHILS NFR BLD AUTO: 0.6 %
BILIRUB SERPL-MCNC: 0.4 MG/DL
BUN SERPL-MCNC: 20 MG/DL
CALCIUM SERPL-MCNC: 9.2 MG/DL
CHLORIDE SERPL-SCNC: 105 MMOL/L
CHOLEST SERPL-MCNC: 121 MG/DL
CO2 SERPL-SCNC: 26 MMOL/L
CREAT SERPL-MCNC: 1.02 MG/DL
EGFR: 71 ML/MIN/1.73M2
EOSINOPHIL # BLD AUTO: 0.11 K/UL
EOSINOPHIL NFR BLD AUTO: 1.6 %
GLUCOSE SERPL-MCNC: 94 MG/DL
HCT VFR BLD CALC: 39.3 %
HDLC SERPL-MCNC: 56 MG/DL
HGB BLD-MCNC: 12.9 G/DL
IMM GRANULOCYTES NFR BLD AUTO: 0.6 %
INR PPP: 1.05 RATIO
LDLC SERPL CALC-MCNC: 53 MG/DL
LYMPHOCYTES # BLD AUTO: 1.22 K/UL
LYMPHOCYTES NFR BLD AUTO: 17.4 %
MAN DIFF?: NORMAL
MCHC RBC-ENTMCNC: 32.4 PG
MCHC RBC-ENTMCNC: 32.8 GM/DL
MCV RBC AUTO: 98.7 FL
MONOCYTES # BLD AUTO: 0.58 K/UL
MONOCYTES NFR BLD AUTO: 8.3 %
NEUTROPHILS # BLD AUTO: 5.03 K/UL
NEUTROPHILS NFR BLD AUTO: 71.5 %
NONHDLC SERPL-MCNC: 65 MG/DL
PLATELET # BLD AUTO: 252 K/UL
POTASSIUM SERPL-SCNC: 4.5 MMOL/L
PROT SERPL-MCNC: 6.1 G/DL
PT BLD: 12.3 SEC
RBC # BLD: 3.98 M/UL
RBC # FLD: 13.8 %
SODIUM SERPL-SCNC: 140 MMOL/L
TRIGL SERPL-MCNC: 61 MG/DL
TSH SERPL-ACNC: 1.42 UIU/ML
WBC # FLD AUTO: 7.02 K/UL

## 2022-12-08 NOTE — HISTORY OF PRESENT ILLNESS
[Coronary Artery Disease] : coronary artery disease [Implantable Device/Pacemaker] : implantable device/pacemaker [Chronic Anticoagulation] : chronic anticoagulation [(Patient denies any chest pain, claudication, dyspnea on exertion, orthopnea, palpitations or syncope)] : Patient denies any chest pain, claudication, dyspnea on exertion, orthopnea, palpitations or syncope [Spouse] : spouse [Asthma] : no asthma [COPD] : no COPD [Sleep Apnea] : no sleep apnea [Smoker] : not a smoker [Self] : no previous adverse anesthesia reaction [Chronic Kidney Disease] : no chronic kidney disease [Diabetes] : no diabetes [FreeTextEntry1] : BLL ectropion repair BUL wedge BLL punctoplasty w intubation [FreeTextEntry2] : 12/16/2022 [FreeTextEntry3] : Dr. Alvin Ronquillo [FreeTextEntry4] : Mr. 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  Patient is scheduled for  BLL ectropion repair on 12/16/22. Procedure will be performed by Dr. Ronquillo. As per patient he feels good, denies chest pain, sob, palpitation

## 2022-12-08 NOTE — PLAN
[FreeTextEntry1] : Pre-op  exam is performed. Recommend  to do a blood test today, further management will depend on the blood test results. \par EKG was done and reviewed, NSR 60 bpm, no acute st-t changes\par Patient will be seen by a cardiologist for a clearance\par Recommend to continue  present meds\par BPH - tamsulosin, finasteride\par Depression/anxiety - clonazepam, lexapro\par Hyperlipidemia - zocor \par CAD, s/p PCI and ICD - amiodarone, ASA, losartan\par For insomnia continue  trazadone\par Medical clearance will be completed after a blood test result and cardiology clearance Patient is verbalized understanding\par \par \par Addendum on 12/08/22. Labs, EKG were reviewed. Patient was seen by a cardiologist, cleared for a procedure, recommend  to continue ASA due to h/o stent. Patient is medically cleared for a surgery. Recommend  to take BP meds with a sip of water on the day of the surgery.

## 2023-01-12 NOTE — PROGRESS NOTE ADULT - PROBLEM/PLAN-6
DISPLAY PLAN FREE TEXT Bilobed Flap Text: The defect edges were debeveled with a #15 scalpel blade.  Given the location of the defect and the proximity to free margins a bilobe flap was deemed most appropriate.  Using a sterile surgical marker, an appropriate bilobe flap drawn around the defect.    The area thus outlined was incised deep to adipose tissue with a #15 scalpel blade.  The skin margins were undermined to an appropriate distance in all directions utilizing iris scissors.

## 2023-01-30 ENCOUNTER — NON-APPOINTMENT (OUTPATIENT)
Age: 88
End: 2023-01-30

## 2023-01-31 ENCOUNTER — APPOINTMENT (OUTPATIENT)
Dept: CARDIOLOGY | Facility: CLINIC | Age: 88
End: 2023-01-31
Payer: MEDICARE

## 2023-01-31 VITALS
BODY MASS INDEX: 27.25 KG/M2 | WEIGHT: 163.56 LBS | HEART RATE: 61 BPM | DIASTOLIC BLOOD PRESSURE: 60 MMHG | SYSTOLIC BLOOD PRESSURE: 120 MMHG | HEIGHT: 65 IN | OXYGEN SATURATION: 99 %

## 2023-01-31 PROCEDURE — 99215 OFFICE O/P EST HI 40 MIN: CPT

## 2023-02-02 ENCOUNTER — APPOINTMENT (OUTPATIENT)
Dept: CT IMAGING | Facility: CLINIC | Age: 88
End: 2023-02-02

## 2023-02-02 ENCOUNTER — INPATIENT (INPATIENT)
Facility: HOSPITAL | Age: 88
LOS: 6 days | Discharge: HOME | DRG: 286 | End: 2023-02-09
Attending: INTERNAL MEDICINE | Admitting: STUDENT IN AN ORGANIZED HEALTH CARE EDUCATION/TRAINING PROGRAM
Payer: MEDICARE

## 2023-02-02 VITALS
TEMPERATURE: 98 F | SYSTOLIC BLOOD PRESSURE: 107 MMHG | HEART RATE: 60 BPM | RESPIRATION RATE: 17 BRPM | OXYGEN SATURATION: 99 % | DIASTOLIC BLOOD PRESSURE: 64 MMHG

## 2023-02-02 DIAGNOSIS — Z98.890 OTHER SPECIFIED POSTPROCEDURAL STATES: Chronic | ICD-10-CM

## 2023-02-02 DIAGNOSIS — Z96.659 PRESENCE OF UNSPECIFIED ARTIFICIAL KNEE JOINT: Chronic | ICD-10-CM

## 2023-02-02 DIAGNOSIS — Z95.810 PRESENCE OF AUTOMATIC (IMPLANTABLE) CARDIAC DEFIBRILLATOR: Chronic | ICD-10-CM

## 2023-02-02 DIAGNOSIS — Z95.5 PRESENCE OF CORONARY ANGIOPLASTY IMPLANT AND GRAFT: Chronic | ICD-10-CM

## 2023-02-02 LAB
ALBUMIN SERPL ELPH-MCNC: 3.4 G/DL — SIGNIFICANT CHANGE UP (ref 3.3–5)
ALP SERPL-CCNC: 76 U/L — SIGNIFICANT CHANGE UP (ref 40–120)
ALT FLD-CCNC: 8 U/L — LOW (ref 10–45)
ANION GAP SERPL CALC-SCNC: 11 MMOL/L — SIGNIFICANT CHANGE UP (ref 5–17)
APTT BLD: 33.3 SEC — SIGNIFICANT CHANGE UP (ref 27.5–35.5)
AST SERPL-CCNC: 15 U/L — SIGNIFICANT CHANGE UP (ref 10–40)
BASE EXCESS BLDV CALC-SCNC: 0.8 MMOL/L — SIGNIFICANT CHANGE UP (ref -2–3)
BASOPHILS # BLD AUTO: 0.05 K/UL — SIGNIFICANT CHANGE UP (ref 0–0.2)
BASOPHILS NFR BLD AUTO: 0.8 % — SIGNIFICANT CHANGE UP (ref 0–2)
BILIRUB SERPL-MCNC: 0.7 MG/DL — SIGNIFICANT CHANGE UP (ref 0.2–1.2)
BUN SERPL-MCNC: 16 MG/DL — SIGNIFICANT CHANGE UP (ref 7–23)
CA-I SERPL-SCNC: 1.23 MMOL/L — SIGNIFICANT CHANGE UP (ref 1.15–1.33)
CALCIUM SERPL-MCNC: 8.8 MG/DL — SIGNIFICANT CHANGE UP (ref 8.4–10.5)
CHLORIDE BLDV-SCNC: 104 MMOL/L — SIGNIFICANT CHANGE UP (ref 96–108)
CHLORIDE SERPL-SCNC: 104 MMOL/L — SIGNIFICANT CHANGE UP (ref 96–108)
CK MB CFR SERPL CALC: 1.5 NG/ML — SIGNIFICANT CHANGE UP (ref 0–6.7)
CO2 BLDV-SCNC: 29 MMOL/L — HIGH (ref 22–26)
CO2 SERPL-SCNC: 23 MMOL/L — SIGNIFICANT CHANGE UP (ref 22–31)
CREAT SERPL-MCNC: 0.86 MG/DL — SIGNIFICANT CHANGE UP (ref 0.5–1.3)
EGFR: 83 ML/MIN/1.73M2 — SIGNIFICANT CHANGE UP
EOSINOPHIL # BLD AUTO: 0.17 K/UL — SIGNIFICANT CHANGE UP (ref 0–0.5)
EOSINOPHIL NFR BLD AUTO: 2.7 % — SIGNIFICANT CHANGE UP (ref 0–6)
FLUAV AG NPH QL: SIGNIFICANT CHANGE UP
FLUBV AG NPH QL: SIGNIFICANT CHANGE UP
GAS PNL BLDV: 134 MMOL/L — LOW (ref 136–145)
GAS PNL BLDV: SIGNIFICANT CHANGE UP
GAS PNL BLDV: SIGNIFICANT CHANGE UP
GLUCOSE BLDV-MCNC: 85 MG/DL — SIGNIFICANT CHANGE UP (ref 70–99)
GLUCOSE SERPL-MCNC: 97 MG/DL — SIGNIFICANT CHANGE UP (ref 70–99)
HCO3 BLDV-SCNC: 27 MMOL/L — SIGNIFICANT CHANGE UP (ref 22–29)
HCT VFR BLD CALC: 39.3 % — SIGNIFICANT CHANGE UP (ref 39–50)
HCT VFR BLDA CALC: 41 % — SIGNIFICANT CHANGE UP (ref 39–51)
HGB BLD CALC-MCNC: 13.6 G/DL — SIGNIFICANT CHANGE UP (ref 12.6–17.4)
HGB BLD-MCNC: 13.2 G/DL — SIGNIFICANT CHANGE UP (ref 13–17)
IMM GRANULOCYTES NFR BLD AUTO: 0.6 % — SIGNIFICANT CHANGE UP (ref 0–0.9)
INR BLD: 1.17 RATIO — HIGH (ref 0.88–1.16)
LACTATE BLDV-MCNC: 1.1 MMOL/L — SIGNIFICANT CHANGE UP (ref 0.5–2)
LIDOCAIN IGE QN: 17 U/L — SIGNIFICANT CHANGE UP (ref 7–60)
LYMPHOCYTES # BLD AUTO: 1.22 K/UL — SIGNIFICANT CHANGE UP (ref 1–3.3)
LYMPHOCYTES # BLD AUTO: 19.1 % — SIGNIFICANT CHANGE UP (ref 13–44)
MAGNESIUM SERPL-MCNC: 1.8 MG/DL — SIGNIFICANT CHANGE UP (ref 1.6–2.6)
MCHC RBC-ENTMCNC: 32.9 PG — SIGNIFICANT CHANGE UP (ref 27–34)
MCHC RBC-ENTMCNC: 33.6 GM/DL — SIGNIFICANT CHANGE UP (ref 32–36)
MCV RBC AUTO: 98 FL — SIGNIFICANT CHANGE UP (ref 80–100)
MONOCYTES # BLD AUTO: 0.77 K/UL — SIGNIFICANT CHANGE UP (ref 0–0.9)
MONOCYTES NFR BLD AUTO: 12.1 % — SIGNIFICANT CHANGE UP (ref 2–14)
NEUTROPHILS # BLD AUTO: 4.13 K/UL — SIGNIFICANT CHANGE UP (ref 1.8–7.4)
NEUTROPHILS NFR BLD AUTO: 64.7 % — SIGNIFICANT CHANGE UP (ref 43–77)
NRBC # BLD: 0 /100 WBCS — SIGNIFICANT CHANGE UP (ref 0–0)
NT-PROBNP SERPL-SCNC: 1362 PG/ML — HIGH (ref 0–300)
PCO2 BLDV: 49 MMHG — SIGNIFICANT CHANGE UP (ref 42–55)
PH BLDV: 7.35 — SIGNIFICANT CHANGE UP (ref 7.32–7.43)
PLATELET # BLD AUTO: 212 K/UL — SIGNIFICANT CHANGE UP (ref 150–400)
PO2 BLDV: 39 MMHG — SIGNIFICANT CHANGE UP (ref 25–45)
POTASSIUM BLDV-SCNC: 4.1 MMOL/L — SIGNIFICANT CHANGE UP (ref 3.5–5.1)
POTASSIUM SERPL-MCNC: 4.3 MMOL/L — SIGNIFICANT CHANGE UP (ref 3.5–5.3)
POTASSIUM SERPL-SCNC: 4.3 MMOL/L — SIGNIFICANT CHANGE UP (ref 3.5–5.3)
PROT SERPL-MCNC: 6.1 G/DL — SIGNIFICANT CHANGE UP (ref 6–8.3)
PROTHROM AB SERPL-ACNC: 13.5 SEC — HIGH (ref 10.5–13.4)
RBC # BLD: 4.01 M/UL — LOW (ref 4.2–5.8)
RBC # FLD: 14 % — SIGNIFICANT CHANGE UP (ref 10.3–14.5)
RSV RNA NPH QL NAA+NON-PROBE: SIGNIFICANT CHANGE UP
SAO2 % BLDV: 62.1 % — LOW (ref 67–88)
SARS-COV-2 RNA SPEC QL NAA+PROBE: SIGNIFICANT CHANGE UP
SODIUM SERPL-SCNC: 138 MMOL/L — SIGNIFICANT CHANGE UP (ref 135–145)
TROPONIN T, HIGH SENSITIVITY RESULT: 19 NG/L — SIGNIFICANT CHANGE UP (ref 0–51)
TROPONIN T, HIGH SENSITIVITY RESULT: 19 NG/L — SIGNIFICANT CHANGE UP (ref 0–51)
WBC # BLD: 6.38 K/UL — SIGNIFICANT CHANGE UP (ref 3.8–10.5)
WBC # FLD AUTO: 6.38 K/UL — SIGNIFICANT CHANGE UP (ref 3.8–10.5)

## 2023-02-02 PROCEDURE — 99223 1ST HOSP IP/OBS HIGH 75: CPT | Mod: FS,CS

## 2023-02-02 PROCEDURE — 71045 X-RAY EXAM CHEST 1 VIEW: CPT | Mod: 26

## 2023-02-02 RX ORDER — FINASTERIDE 5 MG/1
5 TABLET, FILM COATED ORAL AT BEDTIME
Refills: 0 | Status: DISCONTINUED | OUTPATIENT
Start: 2023-02-02 | End: 2023-02-09

## 2023-02-02 RX ORDER — CLONAZEPAM 1 MG
1 TABLET ORAL
Qty: 0 | Refills: 0 | DISCHARGE

## 2023-02-02 RX ORDER — LOSARTAN POTASSIUM 100 MG/1
25 TABLET, FILM COATED ORAL
Refills: 0 | Status: DISCONTINUED | OUTPATIENT
Start: 2023-02-02 | End: 2023-02-09

## 2023-02-02 RX ORDER — TAMSULOSIN HYDROCHLORIDE 0.4 MG/1
0.4 CAPSULE ORAL AT BEDTIME
Refills: 0 | Status: DISCONTINUED | OUTPATIENT
Start: 2023-02-02 | End: 2023-02-09

## 2023-02-02 RX ORDER — ESCITALOPRAM OXALATE 10 MG/1
20 TABLET, FILM COATED ORAL DAILY
Refills: 0 | Status: DISCONTINUED | OUTPATIENT
Start: 2023-02-03 | End: 2023-02-09

## 2023-02-02 RX ORDER — CLONAZEPAM 1 MG
1 TABLET ORAL AT BEDTIME
Refills: 0 | Status: DISCONTINUED | OUTPATIENT
Start: 2023-02-02 | End: 2023-02-09

## 2023-02-02 RX ORDER — CARVEDILOL PHOSPHATE 80 MG/1
6.25 CAPSULE, EXTENDED RELEASE ORAL EVERY 12 HOURS
Refills: 0 | Status: DISCONTINUED | OUTPATIENT
Start: 2023-02-02 | End: 2023-02-09

## 2023-02-02 RX ORDER — MIRTAZAPINE 45 MG/1
15 TABLET, ORALLY DISINTEGRATING ORAL AT BEDTIME
Refills: 0 | Status: DISCONTINUED | OUTPATIENT
Start: 2023-02-02 | End: 2023-02-09

## 2023-02-02 RX ORDER — AMIODARONE HYDROCHLORIDE 400 MG/1
100 TABLET ORAL DAILY
Refills: 0 | Status: DISCONTINUED | OUTPATIENT
Start: 2023-02-03 | End: 2023-02-09

## 2023-02-02 RX ORDER — SIMVASTATIN 20 MG/1
20 TABLET, FILM COATED ORAL AT BEDTIME
Refills: 0 | Status: DISCONTINUED | OUTPATIENT
Start: 2023-02-02 | End: 2023-02-09

## 2023-02-02 RX ORDER — ASPIRIN/CALCIUM CARB/MAGNESIUM 324 MG
81 TABLET ORAL DAILY
Refills: 0 | Status: DISCONTINUED | OUTPATIENT
Start: 2023-02-02 | End: 2023-02-09

## 2023-02-02 RX ORDER — CLONAZEPAM 1 MG
0.5 TABLET ORAL
Refills: 0 | Status: DISCONTINUED | OUTPATIENT
Start: 2023-02-02 | End: 2023-02-09

## 2023-02-02 RX ORDER — CLONAZEPAM 1 MG
1.5 TABLET ORAL
Qty: 0 | Refills: 0 | DISCHARGE

## 2023-02-02 RX ADMIN — Medication 1 MILLIGRAM(S): at 22:17

## 2023-02-02 RX ADMIN — FINASTERIDE 5 MILLIGRAM(S): 5 TABLET, FILM COATED ORAL at 22:16

## 2023-02-02 RX ADMIN — SIMVASTATIN 20 MILLIGRAM(S): 20 TABLET, FILM COATED ORAL at 22:17

## 2023-02-02 RX ADMIN — TAMSULOSIN HYDROCHLORIDE 0.4 MILLIGRAM(S): 0.4 CAPSULE ORAL at 22:16

## 2023-02-02 NOTE — ED PROVIDER NOTE - OBJECTIVE STATEMENT
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  Patient reports that over the past 2 weeks they have been having intermittent episodes of shortness of breath lasting minutes at the time today it lasted 6 to 7 minutes which is longer than usual prompting the patient to come into the emergency department. Their pain/symptom is moderate, intermittent, non mediating with rest. Started randomly. Denies falls, trauma, headache, chest pain, cough, rhinorrhea, abdominal pain, nausea, vomiting, diarrhea, urinary symptoms, stool symptoms, skin changes. 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  Patient reports that over the past 2 weeks they have been having intermittent episodes of shortness of breath lasting minutes at the time today it lasted 6 to 7 minutes which is longer than usual prompting the patient to come into the emergency department. Their pain/symptom is moderate, intermittent, non mediating with rest. Started randomly. Denies falls, trauma, headache, chest pain, cough, rhinorrhea, abdominal pain, nausea, vomiting, diarrhea, urinary symptoms, stool symptoms, skin changes.      Attending note.  Patient was seen in room #54.  Agree with above.  Patient has been having episodes of difficulty breathing or awareness of breathing for the last several months which became worse over the last 2 weeks that being becoming more frequent.  Patient states it happens several times during the course of the day and lasts typically 4 to 6 minutes.  He denies any chest pain, palpitations, lightheadedness, dizziness or near syncopal events while this happens.  It can happen anytime during the day but does not generally happen when he is exerting himself.  He was seen by his cardiologist in December as well as 2 days ago.  He is scheduled for an echocardiogram on Monday.  He has a past medical history of CAD with 2 stents and permanent pacemaker which was interrogated in December.

## 2023-02-02 NOTE — ED PROVIDER NOTE - PHYSICAL EXAMINATION
General:  non-toxic, NAD  HEENT:  NCAT, PERRL  Cardiac:  RRR, no murmurs, 2+ peripheral pulses  Chest:  CTA  Abdomen:  soft, non-distended, bowel sounds present, no ttp, no rebound or guarding  Extremities:  no peripheral edema, calf tenderness, or leg size discrepancies  Skin:  no acute rashes  Neuro:  AAOx4, 5+motor, sensory grossly intact  Psych:  mood and affect appropriate General:  non-toxic, NAD  HEENT:  NCAT, PERRL  Cardiac:  RRR, no murmurs, 2+ peripheral pulses  Chest:  CTA  Abdomen:  soft, non-distended, bowel sounds present, no ttp, no rebound or guarding  Extremities:  no peripheral edema, calf tenderness, or leg size discrepancies  Skin:  no acute rashes  Neuro:  AAOx4, 5+motor, sensory grossly intact  Psych:  mood and affect appropriate      Attn - alert, NAD, no pallor or jaundice, PERRL 3 mm, moist mm, skin - warm and dry, PPM left upper chest.  Lungs - clear, no w/r/r, good BS bilaterally, Cor - rr, no M, no rub, Abdo - ND, soft, NT, no HSM, no CVAT, no guarding or rebound. Extremities - no edema, no calf tenderness, distal pulses intact and symmetrical, Neuro - intact and non-focal

## 2023-02-02 NOTE — ED CDU PROVIDER INITIAL DAY NOTE - OBJECTIVE STATEMENT
88-year-old male pmhx of pacemaker (interrogated in Dec), CAD s/p 2 stents, anxiety and panic attacks comes to ED w/ shortness of breath worsening over the past 2 weeks. Patient has been having episodes of difficulty breathing or awareness of breathing for the last several months which became worse over the last 2 weeks that being becoming more frequent.  Patient states it happens several times during the course of the day and lasts typically 4 to 6 minutes.  He denies any chest pain, palpitations, lightheadedness, dizziness or near syncopal events while this happens. States that it can happen anytime during the day but does not generally happen when he is exerting himself. He was seen by his cardiologist in December as well as 2 days ago.  He is scheduled for an echocardiogram on Monday.  Patient's wife at bedside states that she had called patient's cardiologist Dr. Villa prior to patient coming to the hospital.  ED course: Troponin 19-19, BNP 1362, COVID negative, CXR negative. Plan for echo and tele monitoring in CDU.

## 2023-02-02 NOTE — ED ADULT NURSE NOTE - OBJECTIVE STATEMENT
89 y/o Male A&Ox3 w/ PMH of MI, stents x2, and pacemaker (cardioverter/defibrillator) presents to ED complaining of sob. Pt states 89 y/o Male A&Ox3 w/ PMH of MI, stents x2, and pacemaker (cardioverter/defibrillator) presents to ED complaining of sob. Pt states that he has sob on and off for "about a month but has escalated this week." Pt states he will be sitting down and has sudden "SOB and labored breathing for 6 minutes." Pt notes that he does not feel sob on exertion, but when he is sitting or sleeping. Pt denies chest pain, urinary symptoms, N/V/D, fever, and chills. Upon assessment, no edema is noted to the legs. Pt states he is typically active. Pt safety and comfort provided. 87 y/o Male A&Ox3 w/ PMH of MI, stents x2, and pacemaker (cardioverter/defibrillator) presents to ED complaining of sob. Pt states that he has sob on and off for "about a month but has escalated this week." Pt states he will be sitting down and has sudden "SOB and labored breathing for 6 minutes." Pt notes that he does not feel sob on exertion, but when he is sitting or sleeping. Pt denies chest pain, urinary symptoms, N/V/D, fever, and chills. Upon assessment, no edema is noted to the legs. Pt states he is typically active. EKG done. Pt. placed on CM. IV access obtained. Pt safety and comfort provided.

## 2023-02-02 NOTE — ED PROVIDER NOTE - NSICDXPASTSURGICALHX_GEN_ALL_CORE_FT
PAST SURGICAL HISTORY:  History of knee replacement     S/P coronary artery stent placement     S/P hernia surgery Emergent incarcerated Inguinal hernia  sx complicated by hematoma and anemia in Jan 2019    S/P implantation of automatic cardioverter/defibrillator (AICD) AND PPM

## 2023-02-02 NOTE — ED CDU PROVIDER INITIAL DAY NOTE - NS ED ROS FT
Constitutional: No fever or chills  Eyes: No visual changes, no eye pain   CV: No chest pain or lower extremity edema  Resp: +SOB no cough  GI: No abd pain. No nausea or vomiting. No diarrhea.   : No dysuria, hematuria.   MSK: No musculoskeletal pain  Skin: No rash  Psych: No complaints   Neuro: No headache. No numbness or tingling. No weakness.  Endo: No  known diabetes

## 2023-02-02 NOTE — ED CDU PROVIDER INITIAL DAY NOTE - PHYSICAL EXAMINATION
General:  non-toxic, NAD  	HEENT:  NCAT  	Cardiac:  RRR, no murmurs  	Chest:  CTA, no wheezing   	Abdomen:  soft, non-distended, nontender to palpation   	Extremities:  no peripheral edema, calf tenderness, or leg size discrepancies  	Skin:  no visible rashes  	Neuro:  AAOx3, moving all extremities without difficulty, clear speech  	Psych:  mood and affect appropriate

## 2023-02-02 NOTE — ED CLERICAL - NS ED CLERK NOTE PRE-ARRIVAL INFORMATION; ADDITIONAL PRE-ARRIVAL INFORMATION
CC/Reason For referral: worsening dyspnea  Preferred Consultant(if applicable):   Who admits for you (if needed):  House service  Do you have documents you would like to fax over?  in [employer]  Would you still like to speak to an ED attending?  only if the ED attending feels it is necessary

## 2023-02-02 NOTE — ED CDU PROVIDER DISPOSITION NOTE - NSFOLLOWUPINSTRUCTIONS_ED_ALL_ED_FT
Hydrate.      We recommend you follow up with your primary care provider within the next 2-3 days, please bring all of your results with you. You can also follow up with your cardiologist.     Please return to the Emergency Department with new, worsening, or concerning symptoms, such as:  -Shortness of breath or trouble breathing  -Pressure, pain, tightness in chest  -Facial drooping, arm weakness, or speech difficulty   -Head injury or loss of consciousness   -Nonstop bleeding or an open wound     *More detailed information regarding your visit and discharge can be found by reviewing this packet

## 2023-02-02 NOTE — ED CDU PROVIDER INITIAL DAY NOTE - NS ED ATTENDING STATEMENT MOD
This was a shared visit with the MARVEL. I reviewed and verified the documentation and independently performed the documented:

## 2023-02-02 NOTE — ED CDU PROVIDER DISPOSITION NOTE - CLINICAL COURSE
88-year-old male pmhx of pacemaker (interrogated in Dec), CAD s/p 2 stents, anxiety and panic attacks comes to ED w/ shortness of breath worsening over the past 2 weeks. Patient has been having episodes of difficulty breathing or awareness of breathing for the last several months which became worse over the last 2 weeks that being becoming more frequent.  Patient states it happens several times during the course of the day and lasts typically 4 to 6 minutes.  He denies any chest pain, palpitations, lightheadedness, dizziness or near syncopal events while this happens. States that it can happen anytime during the day but does not generally happen when he is exerting himself. He was seen by his cardiologist in December as well as 2 days ago.  He is scheduled for an echocardiogram on Monday.  Patient's wife at bedside states that she had called patient's cardiologist Dr. Villa prior to patient coming to the hospital.  ED course: Troponin 19-19, BNP 1362, COVID negative, CXR negative. Plan for echo and tele monitoring in CDU. 88-year-old male pmhx of pacemaker (interrogated in Dec), CAD s/p 2 stents, anxiety and panic attacks comes to ED w/ shortness of breath worsening over the past 2 weeks. Patient has been having episodes of difficulty breathing or awareness of breathing for the last several months which became worse over the last 2 weeks that being becoming more frequent.  Patient states it happens several times during the course of the day and lasts typically 4 to 6 minutes.  He denies any chest pain, palpitations, lightheadedness, dizziness or near syncopal events while this happens. States that it can happen anytime during the day but does not generally happen when he is exerting himself. He was seen by his cardiologist in December as well as 2 days ago.  He is scheduled for an echocardiogram on Monday.  Patient's wife at bedside states that she had called patient's cardiologist Dr. Villa prior to patient coming to the hospital.  ED course: Troponin 19-19, BNP 1362, COVID negative, CXR negative. Plan for echo and tele monitoring in CDU. Case was discussed with patient's cardiologist, who also recommended CT chest. CT showed "5 cm mid ascending aortic aneurysm." Cleared by CT surgery. Echo performed. Unable to reach Dr. Villa to discuss results. Spoke with cardiologist Dr. Al, who recommended admission for further workup. Patient and patient's wife agreeable to admission. Discussed with Dr. De La Torre.

## 2023-02-02 NOTE — ED PROVIDER NOTE - DIFFERENTIAL DIAGNOSIS
Patient with shortness of breath which is intermittent and differential not limited to ACS versus arrhythmia versus pacemaker dysfunction Differential Diagnosis

## 2023-02-02 NOTE — ED PROVIDER NOTE - CLINICAL SUMMARY MEDICAL DECISION MAKING FREE TEXT BOX
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  Their symptoms in the setting of their significant cardiac history are concerning for anxiety/panic attack, ACS.    Ordered labs, imaging, medications for diagnosis, management, and treatment. 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  Their symptoms in the setting of their significant cardiac history are concerning for anxiety/panic attack, ACS.    Ordered labs, imaging, medications for diagnosis, management, and treatment.     Attending note.  Patient with episodes of intermittent shortness of breath lasting 4 to 6 minutes without or other associated symptoms.  Labs, chest x-ray, troponin possible CDU versus admission for echocardiogram

## 2023-02-02 NOTE — ED PROVIDER NOTE - NSICDXPASTMEDICALHX_GEN_ALL_CORE_FT
PAST MEDICAL HISTORY:  Arrhythmia s/p AICD/PPM    Benign prostatic hyperplasia     Coronary artery disease s/p stents    Hyperlipidemia     Hypertension

## 2023-02-03 ENCOUNTER — RX RENEWAL (OUTPATIENT)
Age: 88
End: 2023-02-03

## 2023-02-03 ENCOUNTER — NON-APPOINTMENT (OUTPATIENT)
Age: 88
End: 2023-02-03

## 2023-02-03 DIAGNOSIS — I71.9 AORTIC ANEURYSM OF UNSPECIFIED SITE, WITHOUT RUPTURE: ICD-10-CM

## 2023-02-03 LAB
A1C WITH ESTIMATED AVERAGE GLUCOSE RESULT: 5.3 % — SIGNIFICANT CHANGE UP (ref 4–5.6)
CHOLEST SERPL-MCNC: 96 MG/DL — SIGNIFICANT CHANGE UP
ESTIMATED AVERAGE GLUCOSE: 105 MG/DL — SIGNIFICANT CHANGE UP (ref 68–114)
HDLC SERPL-MCNC: 43 MG/DL — SIGNIFICANT CHANGE UP
LIPID PNL WITH DIRECT LDL SERPL: 36 MG/DL — SIGNIFICANT CHANGE UP
NON HDL CHOLESTEROL: 53 MG/DL — SIGNIFICANT CHANGE UP
TRIGL SERPL-MCNC: 84 MG/DL — SIGNIFICANT CHANGE UP
TSH SERPL-MCNC: 0.78 UIU/ML — SIGNIFICANT CHANGE UP (ref 0.27–4.2)

## 2023-02-03 PROCEDURE — 99233 SBSQ HOSP IP/OBS HIGH 50: CPT | Mod: FS,CS

## 2023-02-03 PROCEDURE — 71250 CT THORAX DX C-: CPT | Mod: 26,MA

## 2023-02-03 RX ADMIN — MIRTAZAPINE 15 MILLIGRAM(S): 45 TABLET, ORALLY DISINTEGRATING ORAL at 21:46

## 2023-02-03 RX ADMIN — FINASTERIDE 5 MILLIGRAM(S): 5 TABLET, FILM COATED ORAL at 21:46

## 2023-02-03 RX ADMIN — CARVEDILOL PHOSPHATE 6.25 MILLIGRAM(S): 80 CAPSULE, EXTENDED RELEASE ORAL at 18:23

## 2023-02-03 RX ADMIN — TAMSULOSIN HYDROCHLORIDE 0.4 MILLIGRAM(S): 0.4 CAPSULE ORAL at 21:46

## 2023-02-03 RX ADMIN — Medication 81 MILLIGRAM(S): at 18:23

## 2023-02-03 RX ADMIN — AMIODARONE HYDROCHLORIDE 100 MILLIGRAM(S): 400 TABLET ORAL at 05:28

## 2023-02-03 RX ADMIN — SIMVASTATIN 20 MILLIGRAM(S): 20 TABLET, FILM COATED ORAL at 21:46

## 2023-02-03 RX ADMIN — Medication 1 MILLIGRAM(S): at 21:46

## 2023-02-03 RX ADMIN — ESCITALOPRAM OXALATE 20 MILLIGRAM(S): 10 TABLET, FILM COATED ORAL at 21:46

## 2023-02-03 RX ADMIN — LOSARTAN POTASSIUM 25 MILLIGRAM(S): 100 TABLET, FILM COATED ORAL at 18:23

## 2023-02-03 NOTE — CONSULT NOTE ADULT - SUBJECTIVE AND OBJECTIVE BOX
History of Present Illness:  88y Male    Past Medical History  Coronary artery disease  s/p stents    Arrhythmia  s/p AICD/PPM    Hyperlipidemia    Hypertension    Benign prostatic hyperplasia        Past Surgical History  S/P hernia surgery  Emergent incarcerated Inguinal hernia  sx complicated by hematoma and anemia in Jan 2019    S/P implantation of automatic cardioverter/defibrillator (AICD)  AND PPM    History of knee replacement    S/P coronary artery stent placement        MEDICATIONS  (STANDING):  aMIOdarone    Tablet 100 milliGRAM(s) Oral daily  aspirin enteric coated 81 milliGRAM(s) Oral daily  carvedilol 6.25 milliGRAM(s) Oral every 12 hours  clonazePAM  Tablet 1 milliGRAM(s) Oral at bedtime  clonazePAM  Tablet 0.5 milliGRAM(s) Oral <User Schedule>  escitalopram 20 milliGRAM(s) Oral daily  finasteride 5 milliGRAM(s) Oral at bedtime  losartan 25 milliGRAM(s) Oral two times a day  mirtazapine 15 milliGRAM(s) Oral at bedtime  simvastatin 20 milliGRAM(s) Oral at bedtime  tamsulosin 0.4 milliGRAM(s) Oral at bedtime    MEDICATIONS  (PRN):    Antiplatelet therapy:                           Last dose/amt:    Allergies: No Known Allergies      SOCIAL HISTORY:  Smoker: [ ] Yes  [ ] No        PACK YEARS:                         WHEN QUIT?  ETOH use: [ ] Yes  [ ] No              FREQUENCY / QUANTITY:  Ilicit Drug use:  [ ] Yes  [ ] No  Occupation:  Live with:  Assist device use:    Relevant Family History  FAMILY HISTORY:  No pertinent family history in first degree relatives        Review of Systems  GENERAL:  Fevers[] chills[] sweats[] fatigue[] weight loss[] weight gain []                                        NEURO:  parathesias[] seizures []  syncope []  confusion []                                                                                  EYES: glasses[]  blurry vision[]  discharge[] pain[] glaucoma []                                                                            ENMT:  difficulty hearing []  vertigo[]  dysphagia[] epistaxis[] recent dental work []                                      CV:  chest pain[] palpitations[] WOMACK [] diaphoresis [] edema[]                                                                                             RESPIRATORY:  wheezing[] SOB[] cough [] sputum[] hemoptysis[]                                                                    GI:  nausea[]  vomiting []  diarrhea[] constipation [] melena []                                                                        : hematuria[ ]  dysuria[ ] urgency[] incontinence[]                                                                                              MUSKULOSKELETAL:  arthritis[ ]  joint swelling [ ] muscle weakness [ ]                                                                  SKIN/BREAST:  rash[ ] itching [ ]  hair loss[ ] masses[ ]                                                                                                PSYCH:  dementia [ ] depresion [ ] anxiety[ ]                                                                                                                  HEME/LYMPH:  bruises easily[ ] enlarged lymph nodes[ ] tender lymph nodes[ ]                                                 ENDOCRINE:  cold intolerance[ ] heat intolerance[ ] polydipsia[ ]                                                                              PHYSICAL EXAM  Vital Signs Last 24 Hrs  T(C): 36.5 (03 Feb 2023 16:54), Max: 36.9 (02 Feb 2023 22:26)  T(F): 97.7 (03 Feb 2023 16:54), Max: 98.5 (02 Feb 2023 22:26)  HR: 60 (03 Feb 2023 16:54) (59 - 67)  BP: 147/61 (03 Feb 2023 16:54) (112/78 - 152/59)  BP(mean): 93 (03 Feb 2023 07:53) (74 - 93)  RR: 16 (03 Feb 2023 16:54) (16 - 22)  SpO2: 95% (03 Feb 2023 16:54) (93% - 100%)    Parameters below as of 03 Feb 2023 16:54  Patient On (Oxygen Delivery Method): room air        General: Well nourished, well developed, no acute distress.                                                         Neuro: Normal exam oriented to person/place & time with no focal motor or sensory  deficits.                    Eyes: Normal exam of conjunctiva & lids, pupils equally reactive.   ENT: Normal exam of nasal/oral mucosa with absence of cyanosis.   Neck: Normal exam of jugular veins, trachea & thyroid.   Chest: Normal lung exam with good air movement absence of wheezes, rales, or rhonchi:                                                                          CV:  Auscultation: normal [ ] S3[ ] S4[ ] Irregular [ ] Rub[ ] Clicks[ ]  Murmurs none:[ ]systolic [ ]  diastolic [ ] holosystolic [ ]  Carotids: No Bruits[ ] Other____________ Abdominal Aorta: normal [ ] nonpalpable[ ]                                                                         GI: Normal exam of abdomen, liver & spleen with no noted masses or tenderness.                                                                                              Extremities: Normal no evidence of cyanosis or deformity Edema: none[ ]trace[ ]1+[ ]2+[ ]3+[ ]4+[ ]  Lower Extremity Pulses: Right[ ] Left[ ]Varicosities[ ]  SKIN : Normal exam to inspection & palation.                                                           LABS:                        13.2   6.38  )-----------( 212      ( 02 Feb 2023 17:40 )             39.3     02-02    138  |  104  |  16  ----------------------------<  97  4.3   |  23  |  0.86    Ca    8.8      02 Feb 2023 17:40  Mg     1.8     02-02    TPro  6.1  /  Alb  3.4  /  TBili  0.7  /  DBili  x   /  AST  15  /  ALT  8<L>  /  AlkPhos  76  02-02    PT/INR - ( 02 Feb 2023 17:40 )   PT: 13.5 sec;   INR: 1.17 ratio         PTT - ( 02 Feb 2023 17:40 )  PTT:33.3 sec    CARDIAC MARKERS ( 02 Feb 2023 17:40 )  x     / x     / x     / x     / 1.5 ng/mL          Cardiac Cath:    TTE / YASMEEN:               History of Present Illness:  88-year-old male pmhx of pacemaker (interrogated in Dec), CAD s/p 2 stents, anxiety and panic attacks comes to ED w/ shortness of breath worsening over the past 2 weeks. Patient has been having episodes of difficulty breathing or awareness of breathing for the last several months which became worse over the last 2 weeks that being becoming more frequent.  Patient states it happens several times during the course of the day and lasts typically 4 to 6 minutes.  He denies any chest pain, palpitations, lightheadedness, dizziness or near syncopal events while this happens. States that it can happen anytime during the day but does not generally happen when he is exerting himself. He was seen by his cardiologist in December as well as 2 days ago.  He is scheduled for an echocardiogram on Monday.  Patient's wife at bedside states that she had called patient's cardiologist Dr. Villa prior to patient coming to the hospital.  ED course: Troponin 19-19, BNP 1362, COVID negative, CXR negative. Plan for echo and tele monitoring in CDU.    CT Surgery consulted s/p CT chest non con revealing 5cm ascending aortic aneursym. Pt currently in no pain, hemodynamically stable, denies CP, back pain, SOB, abdominal pain, n/v. All labs and imaging reviewed with on call CT surgeon, Dr. Anderson.    Past Medical History  Coronary artery disease  s/p stents    Arrhythmia  s/p AICD/PPM    Hyperlipidemia    Hypertension    Benign prostatic hyperplasia        Past Surgical History  S/P hernia surgery  Emergent incarcerated Inguinal hernia  sx complicated by hematoma and anemia in Jan 2019    S/P implantation of automatic cardioverter/defibrillator (AICD)  AND PPM    History of knee replacement    S/P coronary artery stent placement        MEDICATIONS  (STANDING):  aMIOdarone    Tablet 100 milliGRAM(s) Oral daily  aspirin enteric coated 81 milliGRAM(s) Oral daily  carvedilol 6.25 milliGRAM(s) Oral every 12 hours  clonazePAM  Tablet 1 milliGRAM(s) Oral at bedtime  clonazePAM  Tablet 0.5 milliGRAM(s) Oral <User Schedule>  escitalopram 20 milliGRAM(s) Oral daily  finasteride 5 milliGRAM(s) Oral at bedtime  losartan 25 milliGRAM(s) Oral two times a day  mirtazapine 15 milliGRAM(s) Oral at bedtime  simvastatin 20 milliGRAM(s) Oral at bedtime  tamsulosin 0.4 milliGRAM(s) Oral at bedtime    MEDICATIONS  (PRN):    Antiplatelet therapy:                           Last dose/amt:    Allergies: No Known Allergies      SOCIAL HISTORY:  Smoker: [ ] Yes  [ ] No        PACK YEARS:                         WHEN QUIT?  ETOH use: [ ] Yes  [ ] No              FREQUENCY / QUANTITY:  Ilicit Drug use:  [ ] Yes  [ ] No  Occupation:  Live with:  Assist device use:    Relevant Family History  FAMILY HISTORY:  No pertinent family history in first degree relatives        Review of Systems  GENERAL:  Fevers[] chills[] sweats[] fatigue[] weight loss[] weight gain []                                        NEURO:  parathesias[] seizures []  syncope []  confusion []                                                                                  EYES: glasses[]  blurry vision[]  discharge[] pain[] glaucoma []                                                                            ENMT:  difficulty hearing []  vertigo[]  dysphagia[] epistaxis[] recent dental work []                                      CV:  chest pain[] palpitations[] WOMACK [] diaphoresis [] edema[]                                                                                             RESPIRATORY:  wheezing[] SOB[x] cough [] sputum[] hemoptysis[]                                                                    GI:  nausea[]  vomiting []  diarrhea[] constipation [] melena []                                                                        : hematuria[ ]  dysuria[ ] urgency[] incontinence[]                                                                                              MUSKULOSKELETAL:  arthritis[ ]  joint swelling [ ] muscle weakness [ ]                                                                  SKIN/BREAST:  rash[ ] itching [ ]  hair loss[ ] masses[ ]                                                                                                PSYCH:  dementia [ ] depresion [ ] anxiety[ ]                                                                                                                  HEME/LYMPH:  bruises easily[ ] enlarged lymph nodes[ ] tender lymph nodes[ ]                                                 ENDOCRINE:  cold intolerance[ ] heat intolerance[ ] polydipsia[ ]                                                                              PHYSICAL EXAM  Vital Signs Last 24 Hrs  T(C): 36.5 (03 Feb 2023 16:54), Max: 36.9 (02 Feb 2023 22:26)  T(F): 97.7 (03 Feb 2023 16:54), Max: 98.5 (02 Feb 2023 22:26)  HR: 60 (03 Feb 2023 16:54) (59 - 67)  BP: 147/61 (03 Feb 2023 16:54) (112/78 - 152/59)  BP(mean): 93 (03 Feb 2023 07:53) (74 - 93)  RR: 16 (03 Feb 2023 16:54) (16 - 22)  SpO2: 95% (03 Feb 2023 16:54) (93% - 100%)    Parameters below as of 03 Feb 2023 16:54  Patient On (Oxygen Delivery Method): room air        General: Well nourished, well developed, no acute distress.                                                         Neuro: Normal exam oriented to person/place & time with no focal motor or sensory  deficits.                    Eyes: Normal exam of conjunctiva & lids, pupils equally reactive.   ENT: Normal exam of nasal/oral mucosa with absence of cyanosis.   Neck: Normal exam of jugular veins, trachea & thyroid.   Chest: Normal lung exam with good air movement absence of wheezes, rales, or rhonchi:                                                                          CV:  Auscultation: normal [ x] S3[ ] S4[ ] Irregular [ ] Rub[ ] Clicks[ ]  Murmurs none:[ x]systolic [ ]  diastolic [ ] holosystolic [ ]  Carotids: No Bruits[x ] Other____________ Abdominal Aorta: normal [x ] nonpalpable[ ]                                                                         GI: Normal exam of abdomen, liver & spleen with no noted masses or tenderness.                                                                                              Extremities: Normal no evidence of cyanosis or deformity Edema: none[ x]trace[ ]1+[ ]2+[ ]3+[ ]4+[ ]  Lower Extremity Pulses: Right[x ] Left[x ]Varicosities[ ]  SKIN : Normal exam to inspection & palation.                                                           LABS:                        13.2   6.38  )-----------( 212      ( 02 Feb 2023 17:40 )             39.3     02-02    138  |  104  |  16  ----------------------------<  97  4.3   |  23  |  0.86    Ca    8.8      02 Feb 2023 17:40  Mg     1.8     02-02    TPro  6.1  /  Alb  3.4  /  TBili  0.7  /  DBili  x   /  AST  15  /  ALT  8<L>  /  AlkPhos  76  02-02    PT/INR - ( 02 Feb 2023 17:40 )   PT: 13.5 sec;   INR: 1.17 ratio         PTT - ( 02 Feb 2023 17:40 )  PTT:33.3 sec    CARDIAC MARKERS ( 02 Feb 2023 17:40 )  x     / x     / x     / x     / 1.5 ng/mL        < from: CT Chest No Cont (02.03.23 @ 13:35) >  IMPRESSION:  Small focus of clustered nodules/small airways disease in the left upper   lobe. Otherwise no pneumonia.    5 cm mid ascending aortic aneurysm.    < from: Xray Chest 1 View- PORTABLE-Urgent (02.02.23 @ 18:24) >  IMPRESSION:  Clear lungs.

## 2023-02-03 NOTE — ED CDU PROVIDER SUBSEQUENT DAY NOTE - PROGRESS NOTE DETAILS
Pt comfortable. No new complaints. Vital signs stable. ambulatory with cane. tolerating po. echo done - awaiting results. spoke to pts cardiologist Dr. Long Villa who recommends non-contrast CT chest. Will continue to observe and reassess. discussed with Dr. Herrmann. -Laila Hatch PA-C Pt comfortable. No complaints. Vital signs stable. echo and CT chest resulted. +5cm mid ascending aortic aneurysm, discussed with Dr. Herrmann, recommends cardiothoracic consult. Recalled cardiologist Dr. Holguin office to review echo and ct chest. office will page covering physician for Dr. Villa.  -Laila Hatch PA-C seen by cardiothoracic team, recommends outpt followup with Dr. Anderson. -Laila Hatch PA-C Placed phone call to patient's answering service to discuss echo results and further recommendations. - Jessica Johnson PA-C Unable to reach patient's cardiology group. Discussed case with unattached cardiology Dr. Al, recommending admission. Spoke with patient's wife and patient who is agreeable to admission. PMD: Luisana Brito. Hosp paged. - Jessica Johnson PA-C

## 2023-02-03 NOTE — ED CDU PROVIDER SUBSEQUENT DAY NOTE - HISTORY
No interval changes since initial CDU provider note. Pt feels well without complaint. NAD VSS. no events on tele-paced rhythm. Plan for echo and continued tele monitoring in the setting of episodes of shortness of breath. Plan to reach out to patient's cardiologist today. - RYAN Johnson

## 2023-02-03 NOTE — CONSULT NOTE ADULT - PROBLEM SELECTOR RECOMMENDATION 9
CT Chest 2/3/23: 5 cm mid ascending aortic aneurysm  Outpatient CT chest 2014: 4.8cm aortic aneurysm  No acute aortic issue, CT surgery not warranted at this time  Continue with medical management per primary team  All labs and imaging reviewed with on call CT surgeon, Dr. Anderson

## 2023-02-03 NOTE — CONSULT NOTE ADULT - NSCONSULTADDITIONALINFOA_GEN_ALL_CORE
Sherley Tatum, AGACNP-BC  Cardiovascular & Thoracic Surgery  [address] [place] [address]  Office: [phone]    Available on [employer] Teams  Spectra: [id number]  New Consults: [id number]

## 2023-02-03 NOTE — ED ADULT NURSE REASSESSMENT NOTE - NS ED NURSE REASSESS COMMENT FT1
Received report from [facility]ER Gipson. Pt is A&Ox3, breathing spontaneously, unlabored and speaking in full sentences on RA, denies cp/sob, on CM, paced rhythm. Pt safety and comfort measures provided, pending dispo.

## 2023-02-03 NOTE — ED CDU PROVIDER SUBSEQUENT DAY NOTE - CLINICAL SUMMARY MEDICAL DECISION MAKING FREE TEXT BOX
See observation monitoring plan section from initial day note. Pending Echo testing, tele monitoring and discussion with DARCI Callaway

## 2023-02-04 DIAGNOSIS — I71.21 ANEURYSM OF THE ASCENDING AORTA, WITHOUT RUPTURE: ICD-10-CM

## 2023-02-04 DIAGNOSIS — I49.3 VENTRICULAR PREMATURE DEPOLARIZATION: ICD-10-CM

## 2023-02-04 DIAGNOSIS — F41.9 ANXIETY DISORDER, UNSPECIFIED: ICD-10-CM

## 2023-02-04 DIAGNOSIS — E78.5 HYPERLIPIDEMIA, UNSPECIFIED: ICD-10-CM

## 2023-02-04 DIAGNOSIS — I10 ESSENTIAL (PRIMARY) HYPERTENSION: ICD-10-CM

## 2023-02-04 DIAGNOSIS — N40.0 BENIGN PROSTATIC HYPERPLASIA WITHOUT LOWER URINARY TRACT SYMPTOMS: ICD-10-CM

## 2023-02-04 DIAGNOSIS — R06.02 SHORTNESS OF BREATH: ICD-10-CM

## 2023-02-04 DIAGNOSIS — I50.9 HEART FAILURE, UNSPECIFIED: ICD-10-CM

## 2023-02-04 LAB
ALBUMIN SERPL ELPH-MCNC: 3.5 G/DL — SIGNIFICANT CHANGE UP (ref 3.3–5)
ALP SERPL-CCNC: 74 U/L — SIGNIFICANT CHANGE UP (ref 40–120)
ALT FLD-CCNC: 9 U/L — LOW (ref 10–45)
ANION GAP SERPL CALC-SCNC: 9 MMOL/L — SIGNIFICANT CHANGE UP (ref 5–17)
AST SERPL-CCNC: 15 U/L — SIGNIFICANT CHANGE UP (ref 10–40)
BASOPHILS # BLD AUTO: 0.04 K/UL — SIGNIFICANT CHANGE UP (ref 0–0.2)
BASOPHILS NFR BLD AUTO: 0.5 % — SIGNIFICANT CHANGE UP (ref 0–2)
BILIRUB SERPL-MCNC: 0.6 MG/DL — SIGNIFICANT CHANGE UP (ref 0.2–1.2)
BUN SERPL-MCNC: 16 MG/DL — SIGNIFICANT CHANGE UP (ref 7–23)
CALCIUM SERPL-MCNC: 8.9 MG/DL — SIGNIFICANT CHANGE UP (ref 8.4–10.5)
CHLORIDE SERPL-SCNC: 105 MMOL/L — SIGNIFICANT CHANGE UP (ref 96–108)
CO2 SERPL-SCNC: 24 MMOL/L — SIGNIFICANT CHANGE UP (ref 22–31)
CREAT SERPL-MCNC: 0.94 MG/DL — SIGNIFICANT CHANGE UP (ref 0.5–1.3)
EGFR: 78 ML/MIN/1.73M2 — SIGNIFICANT CHANGE UP
EOSINOPHIL # BLD AUTO: 0.18 K/UL — SIGNIFICANT CHANGE UP (ref 0–0.5)
EOSINOPHIL NFR BLD AUTO: 2.2 % — SIGNIFICANT CHANGE UP (ref 0–6)
GLUCOSE SERPL-MCNC: 87 MG/DL — SIGNIFICANT CHANGE UP (ref 70–99)
HCT VFR BLD CALC: 40.5 % — SIGNIFICANT CHANGE UP (ref 39–50)
HGB BLD-MCNC: 13.6 G/DL — SIGNIFICANT CHANGE UP (ref 13–17)
IMM GRANULOCYTES NFR BLD AUTO: 0.6 % — SIGNIFICANT CHANGE UP (ref 0–0.9)
LYMPHOCYTES # BLD AUTO: 1.39 K/UL — SIGNIFICANT CHANGE UP (ref 1–3.3)
LYMPHOCYTES # BLD AUTO: 17 % — SIGNIFICANT CHANGE UP (ref 13–44)
MCHC RBC-ENTMCNC: 32.5 PG — SIGNIFICANT CHANGE UP (ref 27–34)
MCHC RBC-ENTMCNC: 33.6 GM/DL — SIGNIFICANT CHANGE UP (ref 32–36)
MCV RBC AUTO: 96.7 FL — SIGNIFICANT CHANGE UP (ref 80–100)
MONOCYTES # BLD AUTO: 0.91 K/UL — HIGH (ref 0–0.9)
MONOCYTES NFR BLD AUTO: 11.1 % — SIGNIFICANT CHANGE UP (ref 2–14)
NEUTROPHILS # BLD AUTO: 5.62 K/UL — SIGNIFICANT CHANGE UP (ref 1.8–7.4)
NEUTROPHILS NFR BLD AUTO: 68.6 % — SIGNIFICANT CHANGE UP (ref 43–77)
NRBC # BLD: 0 /100 WBCS — SIGNIFICANT CHANGE UP (ref 0–0)
PLATELET # BLD AUTO: 206 K/UL — SIGNIFICANT CHANGE UP (ref 150–400)
POTASSIUM SERPL-MCNC: 4.1 MMOL/L — SIGNIFICANT CHANGE UP (ref 3.5–5.3)
POTASSIUM SERPL-SCNC: 4.1 MMOL/L — SIGNIFICANT CHANGE UP (ref 3.5–5.3)
PROT SERPL-MCNC: 6 G/DL — SIGNIFICANT CHANGE UP (ref 6–8.3)
RBC # BLD: 4.19 M/UL — LOW (ref 4.2–5.8)
RBC # FLD: 13.9 % — SIGNIFICANT CHANGE UP (ref 10.3–14.5)
SODIUM SERPL-SCNC: 138 MMOL/L — SIGNIFICANT CHANGE UP (ref 135–145)
WBC # BLD: 8.19 K/UL — SIGNIFICANT CHANGE UP (ref 3.8–10.5)
WBC # FLD AUTO: 8.19 K/UL — SIGNIFICANT CHANGE UP (ref 3.8–10.5)

## 2023-02-04 PROCEDURE — 12345: CPT | Mod: NC

## 2023-02-04 RX ORDER — LANOLIN ALCOHOL/MO/W.PET/CERES
3 CREAM (GRAM) TOPICAL AT BEDTIME
Refills: 0 | Status: DISCONTINUED | OUTPATIENT
Start: 2023-02-04 | End: 2023-02-09

## 2023-02-04 RX ORDER — ENOXAPARIN SODIUM 100 MG/ML
40 INJECTION SUBCUTANEOUS EVERY 24 HOURS
Refills: 0 | Status: DISCONTINUED | OUTPATIENT
Start: 2023-02-04 | End: 2023-02-09

## 2023-02-04 RX ORDER — ONDANSETRON 8 MG/1
4 TABLET, FILM COATED ORAL EVERY 8 HOURS
Refills: 0 | Status: DISCONTINUED | OUTPATIENT
Start: 2023-02-04 | End: 2023-02-09

## 2023-02-04 RX ORDER — ACETAMINOPHEN 500 MG
650 TABLET ORAL EVERY 6 HOURS
Refills: 0 | Status: DISCONTINUED | OUTPATIENT
Start: 2023-02-04 | End: 2023-02-09

## 2023-02-04 RX ADMIN — FINASTERIDE 5 MILLIGRAM(S): 5 TABLET, FILM COATED ORAL at 22:50

## 2023-02-04 RX ADMIN — CARVEDILOL PHOSPHATE 6.25 MILLIGRAM(S): 80 CAPSULE, EXTENDED RELEASE ORAL at 06:13

## 2023-02-04 RX ADMIN — CARVEDILOL PHOSPHATE 6.25 MILLIGRAM(S): 80 CAPSULE, EXTENDED RELEASE ORAL at 18:57

## 2023-02-04 RX ADMIN — SIMVASTATIN 20 MILLIGRAM(S): 20 TABLET, FILM COATED ORAL at 22:50

## 2023-02-04 RX ADMIN — Medication 1 MILLIGRAM(S): at 22:50

## 2023-02-04 RX ADMIN — ESCITALOPRAM OXALATE 20 MILLIGRAM(S): 10 TABLET, FILM COATED ORAL at 23:35

## 2023-02-04 RX ADMIN — Medication 81 MILLIGRAM(S): at 18:57

## 2023-02-04 RX ADMIN — ENOXAPARIN SODIUM 40 MILLIGRAM(S): 100 INJECTION SUBCUTANEOUS at 18:57

## 2023-02-04 RX ADMIN — AMIODARONE HYDROCHLORIDE 100 MILLIGRAM(S): 400 TABLET ORAL at 06:13

## 2023-02-04 RX ADMIN — LOSARTAN POTASSIUM 25 MILLIGRAM(S): 100 TABLET, FILM COATED ORAL at 18:57

## 2023-02-04 RX ADMIN — LOSARTAN POTASSIUM 25 MILLIGRAM(S): 100 TABLET, FILM COATED ORAL at 06:13

## 2023-02-04 RX ADMIN — TAMSULOSIN HYDROCHLORIDE 0.4 MILLIGRAM(S): 0.4 CAPSULE ORAL at 22:50

## 2023-02-04 RX ADMIN — MIRTAZAPINE 15 MILLIGRAM(S): 45 TABLET, ORALLY DISINTEGRATING ORAL at 22:50

## 2023-02-04 NOTE — H&P ADULT - NSHPPHYSICALEXAM_GEN_ALL_CORE
T(C): 36.6 (02-04-23 @ 01:00), Max: 36.7 (02-03-23 @ 07:53)  HR: 69 (02-04-23 @ 01:00) (60 - 69)  BP: 149/60 (02-04-23 @ 01:00) (129/62 - 152/59)  RR: 18 (02-04-23 @ 01:00) (16 - 18)  SpO2: 95% (02-04-23 @ 01:00) (95% - 100%)  GENERAL: NAD, lying in bed comfortably  EYES: EOMI, PERRLA; conjunctiva and sclera clear  ENMT: Moist oral mucosa, no pharyngeal injection or exudates; normal dentition  NECK: Supple, no palpable masses; no JVD  RESPIRATORY: Normal respiratory effort; lungs are clear to auscultation bilaterally  CARDIOVASCULAR: Regular rate and rhythm, normal S1 and S2, no murmur/rub/gallop; No lower extremity edema; Peripheral pulses are 2+ bilaterally  ABDOMEN: Nontender to palpation, normoactive bowel sounds, no rebound/guarding; No hepatosplenomegaly  MUSCULOSKELETAL:  Normal gait; no clubbing or cyanosis of digits; no joint swelling or tenderness to palpation  PSYCH: A+O to person, place, and time; affect appropriate  NEUROLOGY: CN 2-12 are intact and symmetric; no gross motor or sensory deficits   SKIN: No rashes; no palpable lesions T(C): 36.6 (02-04-23 @ 01:00), Max: 36.7 (02-03-23 @ 07:53)  HR: 69 (02-04-23 @ 01:00) (60 - 69)  BP: 149/60 (02-04-23 @ 01:00) (129/62 - 152/59)  RR: 18 (02-04-23 @ 01:00) (16 - 18)  SpO2: 95% (02-04-23 @ 01:00) (95% - 100%)  GENERAL: NAD, lying in bed comfortably  EYES: EOMI, PERRLA; conjunctiva and sclera clear  ENMT: Moist oral mucosa, no pharyngeal injection or exudates   NECK: Supple, no palpable masses; no JVD  RESPIRATORY: Normal respiratory effort; lungs are clear to auscultation bilaterally  CARDIOVASCULAR: Regular rate and rhythm, normal S1 and S2, no murmur/rub/gallop; No lower extremity edema; Peripheral pulses are 2+ bilaterally  ABDOMEN: Nontender to palpation, normoactive bowel sounds, no rebound/guarding   MUSCULOSKELETAL:   no joint swelling or tenderness to palpation  PSYCH: A+O to person, place, and time; affect appropriate  NEUROLOGY: CN 2-12 are intact and symmetric; no gross motor or sensory deficits   SKIN: No rashes; no palpable lesions

## 2023-02-04 NOTE — H&P ADULT - HISTORY OF PRESENT ILLNESS
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  Patient reports that over the past 2 weeks they have been having intermittent episodes of shortness of breath lasting minutes at the time today it lasted 6 to 7 minutes which is longer than usual prompting the patient to come into the emergency department. Their pain/symptom is moderate, intermittent, non mediating with rest. Started randomly. Denies falls, trauma, headache, chest pain, cough, rhinorrhea, abdominal pain, nausea, vomiting, diarrhea, urinary symptoms, stool symptoms, skin changes.      Attending note.  Patient was seen in room #54.  Agree with above.  Patient has been having episodes of difficulty breathing or awareness of breathing for the last several months which became worse over the last 2 weeks that being becoming more frequent.  Patient states it happens several times during the course of the day and lasts typically 4 to 6 minutes.  He denies any chest pain, palpitations, lightheadedness, dizziness or near syncopal events while this happens.  It can happen anytime during the day but does not generally happen when he is exerting himself.  He was seen by his cardiologist in December as well as 2 days ago.  He is scheduled for an echocardiogram on Monday.  He has a past medical history of CAD with 2 stents and permanent pacemaker which was interrogated in December. 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 alleviated by rest, exacerbated by exertion; over the last couple of weeks, episodes of sob have become longer and more frequent. patient denies associated chest pain, palpitations, loc, dizziness/lightheadedness, cough, fever, wheezing, abdominal discomfort, dysuria, generalized fatigue, poor po intake, bleeding. patient grew concerned so visited his cardiologist, who ddocumented the following; "dyspnea...Unclear etiology...no evidence of fluid overload. Chronic amio therapy although he has been on a lower dose. CT chest to eval lung parenchyma (also assess ascending aortic size)...Repeat TTE...Consdier ischemic eval thereafter...?Dyspnea as anginal equivalent." patient was to undergo testing this upcoming monday on 2/6; however, as symptoms continued to worsen, he presents to [facility] er for further evaluation.                         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 alleviated by rest, exacerbated by exertion; over the last couple of weeks, episodes of sob have become longer and more frequent. patient denies associated chest pain, palpitations, loc, dizziness/lightheadedness, cough, fever, wheezing, abdominal discomfort, dysuria, generalized fatigue, poor po intake, bleeding. patient grew concerned so visited his cardiologist, who ddocumented the following; "dyspnea...Unclear etiology...no evidence of fluid overload. Chronic amio therapy although he has been on a lower dose. CT chest to eval lung parenchyma (also assess ascending aortic size)...Repeat TTE...Consdier ischemic eval thereafter...?Dyspnea as anginal equivalent." patient was to undergo testing this upcoming monday on 2/6; however, as symptoms continued to worsen, he presents to [facility] er for further evaluation.

## 2023-02-04 NOTE — H&P ADULT - NSHPREVIEWOFSYSTEMS_GEN_ALL_CORE
CONSTITUTIONAL: No fever. no weakness  ENMT:  No sinus or throat pain  RESPIRATORY: No cough, wheezing, chills or hemoptysis; +shortness of breath  CARDIOVASCULAR: No chest pain, palpitations, dizziness, or leg swelling  GASTROINTESTINAL: No abdominal or epigastric pain. No nausea, vomiting, or hematemesis; No diarrhea or constipation. No melena or hematochezia.  GENITOURINARY: No dysuria or incontinence  NEUROLOGICAL: No headaches, memory loss, loss of strength, numbness, or tremors  SKIN: No rashes,  No hives or eczema  ENDOCRINE: No heat or cold intolerance; No hair loss  MUSCULOSKELETAL: No joint pain or swelling; No muscle, back, or extremity pain  PSYCHIATRIC: No depression, anxiety, mood swings, or difficulty sleeping  HEME/LYMPH: No easy bruising, or bleeding gums; no enlarged LN

## 2023-02-04 NOTE — CONSULT NOTE ADULT - ASSESSMENT
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 LVEF 55 to 60%. Moderately increased left ventricular internal cavity size. There is moderate eccentric left ventricular hypertrophy. Grade I diastolic dysfunction. Normal right ventricular size and function. Moderately enlarged left atrium.    TTE on  2/2023 shows moderate segmental (akinetic mid to basal inferoseptal and inferolateral walls) LV dysfunction (LVEF ~40%) with elevated lv filling pressures and severely dilated LA + severe RA enlargementtrop 19->19; bnp ~1300, up from ~700 in 2018  ekg with no new st seg - t wave changes suggestive of acute ischemia  -Possibly anginal equivalent  Will persue ischemic work-up Cardiac cath.      Problem/Plan - 3:  ·  Problem: Thoracic ascending aortic aneurysm.   ·  Plan: Outpatient CT chest 2014: 4.8cm aortic aneurysm  CT Chest 2/3/23: 5 cm mid ascending aortic aneurysm was 4.8 cm in 2019  No acute aortic issue, CT surgery consult noted
88-year-old male pmhx of pacemaker (interrogated in Dec), CAD s/p 2 stents, anxiety and panic attacks comes to ED w/ shortness of breath worsening over the past 2 weeks.   CT Surgery consulted s/p CT chest non con revealing 5cm ascending aortic aneurysm Pt currently in no pain, hemodynamically stable, denies CP, back pain, SOB, abdominal pain, n/v. All labs and imaging reviewed with on call CT surgeon, Dr. Anderson.

## 2023-02-04 NOTE — H&P ADULT - PROBLEM SELECTOR PLAN 2
h/o high pvc burden  current ekg  shows a paced rhythm with 1st degree AV block, occasional pvcs, and repolarization changes of lvh; prior in 2019 showed similar findings but with bigeminy rather than occasional pvcs  Last ICD interrogation with 80% AP, no events   continue home amiodarone and coreg

## 2023-02-04 NOTE — H&P ADULT - PROBLEM SELECTOR PLAN 1
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 LVEF 55 to 60%. Moderately increased left ventricular internal cavity size. There is moderate eccentric left ventricular hypertrophy. Grade I diastolic dysfunction. Normal right ventricular size and function. Moderately enlarged left atrium.    tte in 2/2023 shows moderate segmental (akinetic mid to basal inferoseptal and inferolateral walls) LV dysfunction (LVEF ~40%) with elevated lv filling pressures and severely dilated LA + severe RA enlargement  Monitor for chest pain, telemetry/EKG changes,  volume status via I/Os, daily weights  salt and fluid restriction    cont home asa, statin, bb  continue home coreg, cozaar  cards/hf consult in am

## 2023-02-04 NOTE — H&P ADULT - PROBLEM SELECTOR PLAN 3
Outpatient CT chest 2014: 4.8cm aortic aneurysm  CT Chest 2/3/23: 5 cm mid ascending aortic aneurysm  No acute aortic issue, CT surgery not warranted at this time  outpatient cts follow up

## 2023-02-04 NOTE — CONSULT NOTE ADULT - SUBJECTIVE AND OBJECTIVE BOX
Patients mom, micah stated that patient has had a dry cough x 2 weeks. Siblings have the same cough. Mom is requesting a zpac. No other symptoms. Please advise    Ghada Kruse on [place]. DATE OF SERVICE:  02/04/2023  Patient was seen,examined and evaluated  by me.    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 alleviated by rest, exacerbated by exertion; over the last couple of weeks, episodes of sob have become longer and more frequent. patient denies associated chest pain, palpitations, loc, dizziness/lightheadedness, cough, fever, wheezing, abdominal discomfort, dysuria, generalized fatigue, poor po intake, bleeding. patient grew concerned so visited his cardiologist, who ddocumented the following; "dyspnea...Unclear etiology...no evidence of fluid overload. Chronic amio therapy although he has been on a lower dose. CT chest to eval lung parenchyma (also assess ascending aortic size)...Repeat TTE...Consdier ischemic eval thereafter...?Dyspnea as anginal equivalent." patient was to undergo testing this upcoming monday on 2/6; however, as symptoms continued to worsen, he presents to [facility] er for further evaluation.  )      PAST MEDICAL & SURGICAL HISTORY:  Coronary artery disease  s/p stents      Arrhythmia  s/p AICD/PPM      Hyperlipidemia      Hypertension      Benign prostatic hyperplasia      S/P hernia surgery  Emergent incarcerated Inguinal hernia  sx complicated by hematoma and anemia in Jan 2019      S/P implantation of automatic cardioverter/defibrillator (AICD)  AND PPM      History of knee replacement      S/P coronary artery stent placement          MEDICATIONS  (STANDING):  aMIOdarone    Tablet 100 milliGRAM(s) Oral daily  aspirin enteric coated 81 milliGRAM(s) Oral daily  carvedilol 6.25 milliGRAM(s) Oral every 12 hours  clonazePAM  Tablet 1 milliGRAM(s) Oral at bedtime  clonazePAM  Tablet 0.5 milliGRAM(s) Oral <User Schedule>  enoxaparin Injectable 40 milliGRAM(s) SubCutaneous every 24 hours  escitalopram 20 milliGRAM(s) Oral daily  finasteride 5 milliGRAM(s) Oral at bedtime  losartan 25 milliGRAM(s) Oral two times a day  mirtazapine 15 milliGRAM(s) Oral at bedtime  simvastatin 20 milliGRAM(s) Oral at bedtime  tamsulosin 0.4 milliGRAM(s) Oral at bedtime    MEDICATIONS  (PRN):  acetaminophen     Tablet .. 650 milliGRAM(s) Oral every 6 hours PRN Temp greater or equal to 38C (100.4F), Mild Pain (1 - 3)  aluminum hydroxide/magnesium hydroxide/simethicone Suspension 30 milliLiter(s) Oral every 4 hours PRN Dyspepsia  melatonin 3 milliGRAM(s) Oral at bedtime PRN Insomnia  ondansetron Injectable 4 milliGRAM(s) IV Push every 8 hours PRN Nausea and/or Vomiting      FAMILY HISTORY:    No family history of premature coronary artery disease or sudden cardiac death    SOCIAL HISTORY:  Smoking-[ ] Active  [x ] Former [ ] Non Smoker  Alcohol-[x ] Denies [ ] Social [ ] Daily  Ilicit Drug use-[x] Denies [ ] Active user    REVIEW OF SYSTEMS:  Constitutional: [ ] fever, [ ]weight loss, [x ]fatigue   Activity [ ] Bedbound,[x ] Ambulates [x ] Unassisted[ ] Cane/Walker [ ] Assistence.  Effort tolerance:[ ] Excellent [ ] Good [ ] Fair [x ] Poor [ ]  Eyes: [ ] visual changes  Respiratory: [x ]shortness of breath;  [x ] cough, [ ]wheezing, [ ]chills, [ ]hemoptysis  Cardiovascular: [ ] chest pain, [ ]palpitations, [ ]dizziness,  [ ]leg swelling[ ]orthopnea [ ]PND  Gastrointestinal: [ ] abdominal pain, [ ]nausea, [ ]vomiting,  [ ]diarrhea,[ ]constipation  Genitourinary: [ ] dysuria, [ ] hematuria  Neurologic: [ ] headaches [ ] tremors[ ] weakness  Skin: [ ] itching, [ ]burning, [ ] rashes  Endocrine: [ ] heat or cold intolerance  Musculoskeletal: [ ] joint pain or swelling; [ ] muscle, back, or extremity pain  Psychiatric: [ ] depression, [ ]anxiety, [ ]mood swings, or [ ]difficulty sleeping  Hematologic: [ ] easy bruising, [ ] bleeding gums       [ x] All others negative	  [ ] Unable to obtain    Vital Signs Last 24 Hrs  T(C): 36.9 (04 Feb 2023 05:00), Max: 36.9 (04 Feb 2023 05:00)  T(F): 98.4 (04 Feb 2023 05:00), Max: 98.4 (04 Feb 2023 05:00)  HR: 60 (04 Feb 2023 05:00) (60 - 69)  BP: 126/78 (04 Feb 2023 05:00) (126/78 - 152/59)  BP(mean): 93 (03 Feb 2023 07:53) (93 - 93)  RR: 19 (04 Feb 2023 05:00) (16 - 19)  SpO2: 96% (04 Feb 2023 05:00) (95% - 100%)    Parameters below as of 04 Feb 2023 05:00  Patient On (Oxygen Delivery Method): room air      I&O's Summary      PHYSICAL EXAM:  General: No acute distress BMI-28  HEENT: EOMI, PERRL[ ] Icteric  Neck: Supple,[x] JVD  Lungs: Equal air entry bilaterally; [ ] Rales [ ] Rhonchi [ ] Wheezing  Heart: Regular rate and rhythm;[x ] Murmurs-  2 /6 [x ] Systolic [ ] Diastolic [ ] Radiation,No rubs, or gallops  Abdomen: Nontender, bowel sounds present  Extremities: No clubbing, cyanosis, or edema[ ] Calf tenderness  Nervous system:  Alert & Oriented X3, no focal deficits  Psychiatric: Normal affect  Skin: No rashes or lesions      LABS:  02-04    138  |  105  |  16  ----------------------------<  87  4.1   |  24  |  0.94    Ca    8.9      04 Feb 2023 05:57  Mg     1.8     02-02    TPro  6.0  /  Alb  3.5  /  TBili  0.6  /  DBili  x   /  AST  15  /  ALT  9<L>  /  AlkPhos  74  02-04    Creatinine Trend: 0.94<--, 0.86<--                        13.6   8.19  )-----------( 206      ( 04 Feb 2023 05:57 )             40.5     PT/INR - ( 02 Feb 2023 17:40 )   PT: 13.5 sec;   INR: 1.17 ratio         PTT - ( 02 Feb 2023 17:40 )  PTT:33.3 sec    Lipid Panel:   Cardiac Enzymes: CARDIAC MARKERS ( 02 Feb 2023 17:40 )  x     / x     / x     / x     / 1.5 ng/mL      Serum Pro-Brain Natriuretic Peptide: 1362 pg/mL (02-02-23 @ 17:40)        RADIOLOGY:  CT CHEST   IMPRESSION:  Small focus of clustered nodules/small airways disease in the left upper  lobe. Otherwise no pneumonia.  5 cm mid ascending aortic aneurysm.    ECG [my interpretation]:  Atrial-paced rhythm with prolonged AV conduction WITH OCCASIONAL PREMATURE VENTRICULAR COMPLEXES  LEFT VENTRICULAR HYPERTROPHY WITH QRS WIDENING ( Sokolow-Patel , Yandel product )  NONSPECIFIC T WAVE ABNORMALITY    ECHO:  Study Date: 2/3/2023  Conclusions:  1. Mild mitral regurgitation.  2. Moderate aortic regurgitation.  3. Dilated Aortic Root: 4.7 cm. Ascending Aorta: 4.2 cm.  4. Severely dilated left atrium.  LA volume index = 55 cc/m2.  5. Mild left ventricular enlargement.  6. Endocardial visualization enhanced with intravenous  injection of Ultrasonic Enhancing Agent (Definity).  Moderate segmental left ventricular dysfunction. The mid to basal inferoseptal/mid to basal inferolateral wall are akinetic. EF 40%  7. Increased E/e'  is consistent with elevated left ventricular filling pressure.  8. A device wire is noted in the right heart. Severe right atrial enlargement.  9. Normal right ventricular size and function.  10. Mild tricuspid regurgitation.    Study Date: 2/6/2019  Mild global left ventricular systolic dysfunction. EF 49%

## 2023-02-04 NOTE — H&P ADULT - ASSESSMENT
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 LVEF 55 to 60%. Moderately increased left ventricular internal cavity size. There is moderate eccentric left ventricular hypertrophy. Grade I diastolic dysfunction. Normal right ventricular size and function. Moderately enlarged left atrium.    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

## 2023-02-04 NOTE — CONSULT NOTE ADULT - TIME BILLING
- Review of records, telemetry, vital signs and daily labs.   - General and cardiovascular physical examination.  - Generation of cardiovascular treatment plan.  - Coordination of care.      Patient was seen and examined by me on 02/04/2023,interim events noted,labs and radiology studies reviewed.  Donald Al MD,FACC.  [address] [place], [place] [id number].  [phone]

## 2023-02-05 LAB — SARS-COV-2 RNA SPEC QL NAA+PROBE: SIGNIFICANT CHANGE UP

## 2023-02-05 PROCEDURE — 99232 SBSQ HOSP IP/OBS MODERATE 35: CPT

## 2023-02-05 RX ORDER — INFLUENZA VIRUS VACCINE 15; 15; 15; 15 UG/.5ML; UG/.5ML; UG/.5ML; UG/.5ML
0.7 SUSPENSION INTRAMUSCULAR ONCE
Refills: 0 | Status: DISCONTINUED | OUTPATIENT
Start: 2023-02-05 | End: 2023-02-09

## 2023-02-05 RX ADMIN — Medication 0.5 MILLIGRAM(S): at 08:23

## 2023-02-05 RX ADMIN — SIMVASTATIN 20 MILLIGRAM(S): 20 TABLET, FILM COATED ORAL at 22:20

## 2023-02-05 RX ADMIN — CARVEDILOL PHOSPHATE 6.25 MILLIGRAM(S): 80 CAPSULE, EXTENDED RELEASE ORAL at 05:44

## 2023-02-05 RX ADMIN — FINASTERIDE 5 MILLIGRAM(S): 5 TABLET, FILM COATED ORAL at 22:20

## 2023-02-05 RX ADMIN — LOSARTAN POTASSIUM 25 MILLIGRAM(S): 100 TABLET, FILM COATED ORAL at 18:00

## 2023-02-05 RX ADMIN — Medication 81 MILLIGRAM(S): at 11:30

## 2023-02-05 RX ADMIN — Medication 1 MILLIGRAM(S): at 22:20

## 2023-02-05 RX ADMIN — LOSARTAN POTASSIUM 25 MILLIGRAM(S): 100 TABLET, FILM COATED ORAL at 05:44

## 2023-02-05 RX ADMIN — ESCITALOPRAM OXALATE 20 MILLIGRAM(S): 10 TABLET, FILM COATED ORAL at 11:30

## 2023-02-05 RX ADMIN — TAMSULOSIN HYDROCHLORIDE 0.4 MILLIGRAM(S): 0.4 CAPSULE ORAL at 22:20

## 2023-02-05 RX ADMIN — AMIODARONE HYDROCHLORIDE 100 MILLIGRAM(S): 400 TABLET ORAL at 06:54

## 2023-02-05 RX ADMIN — CARVEDILOL PHOSPHATE 6.25 MILLIGRAM(S): 80 CAPSULE, EXTENDED RELEASE ORAL at 17:59

## 2023-02-05 RX ADMIN — ENOXAPARIN SODIUM 40 MILLIGRAM(S): 100 INJECTION SUBCUTANEOUS at 18:00

## 2023-02-05 RX ADMIN — MIRTAZAPINE 15 MILLIGRAM(S): 45 TABLET, ORALLY DISINTEGRATING ORAL at 22:20

## 2023-02-05 NOTE — PATIENT PROFILE ADULT - FALL HARM RISK - HARM RISK INTERVENTIONS

## 2023-02-05 NOTE — DISCUSSION/SUMMARY
[FreeTextEntry1] : Dyspnea: Unclear etiology. Not really with exertion. Clear lungs on exam, no evidence of fluid overload. Chronic amio therapy although he has been on a lower dose. Last ICD interrogation with 80% AP,  no events \par \par CT chest to eval lung parenchyma (also assess ascending aortic size)\par Repeat TTE\par Consdier ischemic eval thereafter \par \par CAD s/p PCI: 2006. Cont asa, simvastatin. ?Dyspnea as anginal equivalent \par \par HTN: BP great today, cont meds\par \par NSVT, PVC's: None recently.  Maintained on amiodarone 100mg, Coreg BID. Monitor TFT's, LFT's, PFT's, regular ophtho appointments \par \par ICD: Interrogations at [facility]\par \par Dilated aorta- 4.8 cm in 2014, 5.0 cm on recent TTE. Serial echos/CT scans. Consider CT Sx eval but can hold off given stability and age. BP control, avoid heavy lifting. Repeat CT scan \par \par RV 3M\par \par

## 2023-02-05 NOTE — PATIENT PROFILE ADULT - FOOD INSECURITY
DR Flaquita harris   
Paper not ready to be  yet  
Pt notified ready for   
Pt's spouse calling re short term disability papers dropped on Thursday 3/11 in [place], would like to know If ready to ?  [phone](Cintia/spouse)  
The patient's wife is calling regarding disability papers.  It is going on 2 weeks to get them back.  She is wanting to know why it is taking so long.  Yamileth will be asking Dr Sams about the paperwork.      The wife also requested to speak to the office mgr regarding why this is taking so long to be completed. Lyla was contacted and will look into the matter and call the patient back.     [place] [phone]  
no

## 2023-02-05 NOTE — HISTORY OF PRESENT ILLNESS
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, HFrEF, on amiodarone for PVC's who presents for follow up.\par \par Pt notes that he has been having episodes of dyspnea, sporadic, not necessarily with exertion\par Happening almost daily, been present x last 6 months, but maybe increasing in frequency lately\par Of note, pt engages in strenuous activities through the house and denies such symptoms with exertion\par Did not have eyelid surgery as planned given inability to stop aspirin\par \par Last ICD interrogation 11/22 without events or significant ectopy\par Maintained on amio 100mg x 4 years\par Known dilated aorta, 5.0 cm on TTE, (4.8cm in 2014)\par No ICD shocks \par \par Recent ICD check showing 1 VT episode in Feb 2021 terminated via 1 ATP, 22 beats of VT in May 2022 self terminated\par He does have rare episodes of syncope which usually occur with his anxiety. Neuro thought possible seizures, no correlation with arrhythmia on the monitor. \par \par Never smoker. 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

## 2023-02-05 NOTE — PHYSICAL EXAM

## 2023-02-06 ENCOUNTER — APPOINTMENT (OUTPATIENT)
Dept: INTERNAL MEDICINE | Facility: CLINIC | Age: 88
End: 2023-02-06

## 2023-02-06 LAB
ANION GAP SERPL CALC-SCNC: 9 MMOL/L — SIGNIFICANT CHANGE UP (ref 5–17)
APTT BLD: 31.5 SEC — SIGNIFICANT CHANGE UP (ref 27.5–35.5)
BLD GP AB SCN SERPL QL: NEGATIVE — SIGNIFICANT CHANGE UP
BUN SERPL-MCNC: 17 MG/DL — SIGNIFICANT CHANGE UP (ref 7–23)
CALCIUM SERPL-MCNC: 8.8 MG/DL — SIGNIFICANT CHANGE UP (ref 8.4–10.5)
CHLORIDE SERPL-SCNC: 105 MMOL/L — SIGNIFICANT CHANGE UP (ref 96–108)
CO2 SERPL-SCNC: 24 MMOL/L — SIGNIFICANT CHANGE UP (ref 22–31)
CREAT SERPL-MCNC: 0.87 MG/DL — SIGNIFICANT CHANGE UP (ref 0.5–1.3)
EGFR: 83 ML/MIN/1.73M2 — SIGNIFICANT CHANGE UP
GLUCOSE SERPL-MCNC: 82 MG/DL — SIGNIFICANT CHANGE UP (ref 70–99)
HCT VFR BLD CALC: 39.8 % — SIGNIFICANT CHANGE UP (ref 39–50)
HGB BLD-MCNC: 13.4 G/DL — SIGNIFICANT CHANGE UP (ref 13–17)
INR BLD: 1.16 RATIO — SIGNIFICANT CHANGE UP (ref 0.88–1.16)
MCHC RBC-ENTMCNC: 32.2 PG — SIGNIFICANT CHANGE UP (ref 27–34)
MCHC RBC-ENTMCNC: 33.7 GM/DL — SIGNIFICANT CHANGE UP (ref 32–36)
MCV RBC AUTO: 95.7 FL — SIGNIFICANT CHANGE UP (ref 80–100)
NRBC # BLD: 0 /100 WBCS — SIGNIFICANT CHANGE UP (ref 0–0)
PLATELET # BLD AUTO: 203 K/UL — SIGNIFICANT CHANGE UP (ref 150–400)
POTASSIUM SERPL-MCNC: 4 MMOL/L — SIGNIFICANT CHANGE UP (ref 3.5–5.3)
POTASSIUM SERPL-SCNC: 4 MMOL/L — SIGNIFICANT CHANGE UP (ref 3.5–5.3)
PROTHROM AB SERPL-ACNC: 13.4 SEC — SIGNIFICANT CHANGE UP (ref 10.5–13.4)
RBC # BLD: 4.16 M/UL — LOW (ref 4.2–5.8)
RBC # FLD: 14 % — SIGNIFICANT CHANGE UP (ref 10.3–14.5)
RH IG SCN BLD-IMP: POSITIVE — SIGNIFICANT CHANGE UP
SODIUM SERPL-SCNC: 138 MMOL/L — SIGNIFICANT CHANGE UP (ref 135–145)
WBC # BLD: 7 K/UL — SIGNIFICANT CHANGE UP (ref 3.8–10.5)
WBC # FLD AUTO: 7 K/UL — SIGNIFICANT CHANGE UP (ref 3.8–10.5)

## 2023-02-06 PROCEDURE — 99232 SBSQ HOSP IP/OBS MODERATE 35: CPT

## 2023-02-06 PROCEDURE — 93283 PRGRMG EVAL IMPLANTABLE DFB: CPT | Mod: 26

## 2023-02-06 RX ADMIN — SIMVASTATIN 20 MILLIGRAM(S): 20 TABLET, FILM COATED ORAL at 21:59

## 2023-02-06 RX ADMIN — Medication 1 MILLIGRAM(S): at 21:59

## 2023-02-06 RX ADMIN — AMIODARONE HYDROCHLORIDE 100 MILLIGRAM(S): 400 TABLET ORAL at 08:26

## 2023-02-06 RX ADMIN — CARVEDILOL PHOSPHATE 6.25 MILLIGRAM(S): 80 CAPSULE, EXTENDED RELEASE ORAL at 21:58

## 2023-02-06 RX ADMIN — TAMSULOSIN HYDROCHLORIDE 0.4 MILLIGRAM(S): 0.4 CAPSULE ORAL at 21:59

## 2023-02-06 RX ADMIN — FINASTERIDE 5 MILLIGRAM(S): 5 TABLET, FILM COATED ORAL at 21:59

## 2023-02-06 RX ADMIN — Medication 0.5 MILLIGRAM(S): at 08:27

## 2023-02-06 RX ADMIN — LOSARTAN POTASSIUM 25 MILLIGRAM(S): 100 TABLET, FILM COATED ORAL at 04:55

## 2023-02-06 RX ADMIN — CARVEDILOL PHOSPHATE 6.25 MILLIGRAM(S): 80 CAPSULE, EXTENDED RELEASE ORAL at 08:26

## 2023-02-06 RX ADMIN — LOSARTAN POTASSIUM 25 MILLIGRAM(S): 100 TABLET, FILM COATED ORAL at 17:45

## 2023-02-06 RX ADMIN — Medication 81 MILLIGRAM(S): at 11:07

## 2023-02-06 RX ADMIN — MIRTAZAPINE 15 MILLIGRAM(S): 45 TABLET, ORALLY DISINTEGRATING ORAL at 21:59

## 2023-02-06 RX ADMIN — ESCITALOPRAM OXALATE 20 MILLIGRAM(S): 10 TABLET, FILM COATED ORAL at 11:08

## 2023-02-06 NOTE — PROCEDURE NOTE - ADDITIONAL PROCEDURE DETAILS
Indication: SOB    Presenting rhythm: AS-VS with intermittent APacing and PVCs  Underlying rhythm: sinus rhythm with ectopy  Pt is not pacemaker dependent.     Estimated remaining battery longevity: 20 months.     AP 93.4%;  0.3%    Events: None    Normal functioning ICD, no events to correlate with patient's symptoms.     - RYAN Nunez  [phone]

## 2023-02-07 ENCOUNTER — TRANSCRIPTION ENCOUNTER (OUTPATIENT)
Age: 88
End: 2023-02-07

## 2023-02-07 LAB
ANION GAP SERPL CALC-SCNC: 10 MMOL/L — SIGNIFICANT CHANGE UP (ref 5–17)
BUN SERPL-MCNC: 18 MG/DL — SIGNIFICANT CHANGE UP (ref 7–23)
CALCIUM SERPL-MCNC: 8.8 MG/DL — SIGNIFICANT CHANGE UP (ref 8.4–10.5)
CHLORIDE SERPL-SCNC: 105 MMOL/L — SIGNIFICANT CHANGE UP (ref 96–108)
CO2 SERPL-SCNC: 21 MMOL/L — LOW (ref 22–31)
CREAT SERPL-MCNC: 0.88 MG/DL — SIGNIFICANT CHANGE UP (ref 0.5–1.3)
EGFR: 83 ML/MIN/1.73M2 — SIGNIFICANT CHANGE UP
GLUCOSE SERPL-MCNC: 86 MG/DL — SIGNIFICANT CHANGE UP (ref 70–99)
HCT VFR BLD CALC: 38.9 % — LOW (ref 39–50)
HGB BLD-MCNC: 13.3 G/DL — SIGNIFICANT CHANGE UP (ref 13–17)
MCHC RBC-ENTMCNC: 32.6 PG — SIGNIFICANT CHANGE UP (ref 27–34)
MCHC RBC-ENTMCNC: 34.2 GM/DL — SIGNIFICANT CHANGE UP (ref 32–36)
MCV RBC AUTO: 95.3 FL — SIGNIFICANT CHANGE UP (ref 80–100)
NRBC # BLD: 0 /100 WBCS — SIGNIFICANT CHANGE UP (ref 0–0)
PLATELET # BLD AUTO: 204 K/UL — SIGNIFICANT CHANGE UP (ref 150–400)
POTASSIUM SERPL-MCNC: 4.3 MMOL/L — SIGNIFICANT CHANGE UP (ref 3.5–5.3)
POTASSIUM SERPL-SCNC: 4.3 MMOL/L — SIGNIFICANT CHANGE UP (ref 3.5–5.3)
RBC # BLD: 4.08 M/UL — LOW (ref 4.2–5.8)
RBC # FLD: 13.8 % — SIGNIFICANT CHANGE UP (ref 10.3–14.5)
SODIUM SERPL-SCNC: 136 MMOL/L — SIGNIFICANT CHANGE UP (ref 135–145)
WBC # BLD: 7.06 K/UL — SIGNIFICANT CHANGE UP (ref 3.8–10.5)
WBC # FLD AUTO: 7.06 K/UL — SIGNIFICANT CHANGE UP (ref 3.8–10.5)

## 2023-02-07 PROCEDURE — 93458 L HRT ARTERY/VENTRICLE ANGIO: CPT | Mod: 26

## 2023-02-07 PROCEDURE — 99152 MOD SED SAME PHYS/QHP 5/>YRS: CPT

## 2023-02-07 PROCEDURE — 99232 SBSQ HOSP IP/OBS MODERATE 35: CPT

## 2023-02-07 RX ADMIN — Medication 0.5 MILLIGRAM(S): at 09:06

## 2023-02-07 RX ADMIN — LOSARTAN POTASSIUM 25 MILLIGRAM(S): 100 TABLET, FILM COATED ORAL at 05:44

## 2023-02-07 RX ADMIN — AMIODARONE HYDROCHLORIDE 100 MILLIGRAM(S): 400 TABLET ORAL at 05:43

## 2023-02-07 RX ADMIN — SIMVASTATIN 20 MILLIGRAM(S): 20 TABLET, FILM COATED ORAL at 21:27

## 2023-02-07 RX ADMIN — FINASTERIDE 5 MILLIGRAM(S): 5 TABLET, FILM COATED ORAL at 21:26

## 2023-02-07 RX ADMIN — ESCITALOPRAM OXALATE 20 MILLIGRAM(S): 10 TABLET, FILM COATED ORAL at 11:07

## 2023-02-07 RX ADMIN — CARVEDILOL PHOSPHATE 6.25 MILLIGRAM(S): 80 CAPSULE, EXTENDED RELEASE ORAL at 21:26

## 2023-02-07 RX ADMIN — Medication 1 MILLIGRAM(S): at 21:26

## 2023-02-07 RX ADMIN — LOSARTAN POTASSIUM 25 MILLIGRAM(S): 100 TABLET, FILM COATED ORAL at 17:53

## 2023-02-07 RX ADMIN — Medication 81 MILLIGRAM(S): at 11:07

## 2023-02-07 RX ADMIN — TAMSULOSIN HYDROCHLORIDE 0.4 MILLIGRAM(S): 0.4 CAPSULE ORAL at 21:26

## 2023-02-07 RX ADMIN — MIRTAZAPINE 15 MILLIGRAM(S): 45 TABLET, ORALLY DISINTEGRATING ORAL at 21:26

## 2023-02-07 RX ADMIN — CARVEDILOL PHOSPHATE 6.25 MILLIGRAM(S): 80 CAPSULE, EXTENDED RELEASE ORAL at 09:06

## 2023-02-07 RX ADMIN — ENOXAPARIN SODIUM 40 MILLIGRAM(S): 100 INJECTION SUBCUTANEOUS at 17:54

## 2023-02-07 NOTE — DISCHARGE NOTE PROVIDER - NSDCFUSCHEDAPPT_GEN_ALL_CORE_FT
[facility] [phone]  Scheduled Appointment: 02/08/2023    [place]  [facility]  CARDIOLOGY [address]   Scheduled Appointment: 03/13/2023     [place]  [facility] Physician [facility]  CARDIOLOGY [address] [facility]  Scheduled Appointment: 03/13/2023

## 2023-02-07 NOTE — DISCHARGE NOTE PROVIDER - HOSPITAL COURSE
Pt is an 88M w/ PMH CAD s/p PCI(w/ stents), HFrEF 2/2 ICM c/b vtach s/p IDD, high PVC burden, HTN, HLD, anxiety/depression, and BPH presenting w/ sob/arellano, suspect due to ADHF, admitted to medicine for further mgmt     #Acute decompensated heart failure.   - cardiac cath on 2/7  - tte in 2/2023 shows moderate segmental (akinetic mid to basal inferoseptal and inferolateral walls) LV dysfunction (LVEF ~40%) with elevated lv filling pressures and severely dilated LA + severe RA enlargement  - daily I/Os, daily weights  - salt and fluid restriction    - c/w home asa, statin, bb  - c/w home coreg, cozaar.  - ICD interrogation: no events, functioning well    #HTN (hypertension).   - continue home cozaar and coreg.    #HLD (hyperlipidemia).   - Continue home statin.    #Anxiety and depression.   - cont home klonopin, remeron, lexapro.    #BPH (benign prostatic hyperplasia).   - cont home flomax and proscar. Pt is an 88M w/ PMH CAD s/p PCI(w/ stents), HFrEF 2/2 ICM c/b vtach s/p IDD, high PVC burden, HTN, HLD, anxiety/depression, and BPH presenting w/ sob/arellano, suspect due to ADHF, admitted to medicine for further mgmt     #Acute decompensated heart failure.   - cardiac cath on 2/7  - tte in 2/2023 shows moderate segmental (akinetic mid to basal inferoseptal and inferolateral walls) LV dysfunction (LVEF ~40%) with elevated lv filling pressures and severely dilated LA + severe RA enlargement  - daily I/Os, daily weights  - salt and fluid restriction    - c/w home asa, statin, bb  - c/w home coreg, cozaar.  - ICD interrogation: no events, functioning well    #HTN (hypertension).   - continue home cozaar and coreg.    #HLD (hyperlipidemia).   - Continue home statin.    #Anxiety and depression.   - cont home klonopin, remeron, lexapro.    #BPH (benign prostatic hyperplasia).   - cont home flomax and proscar.     Pt is an 88M w/ PMH CAD s/p PCI(w/ stents), HFrEF 2/2 ICM c/b vtach s/p IDD, high PVC burden, HTN, HLD, anxiety/depression, and BPH presenting w/ sob/arellano, suspect due to ADHF, admitted to medicine for further mgmt     #Acute decompensated heart failure.   - cardiac cath on 2/7  - tte in 2/2023 shows moderate segmental (akinetic mid to basal inferoseptal and inferolateral walls) LV dysfunction (LVEF ~40%) with elevated lv filling pressures and severely dilated LA + severe RA enlargement  - daily I/Os, daily weights  - salt and fluid restriction    - c/w home asa, statin, bb  - c/w home coreg, cozaar.  - ICD interrogation: no events, functioning well    #HTN (hypertension).   - continue home cozaar and coreg.    #HLD (hyperlipidemia).   - Continue home statin.    #Anxiety and depression.   - cont home klonopin, remeron, lexapro.    #BPH (benign prostatic hyperplasia).   - cont home flomax and proscar.    Medically Cleared by Dr. Fierro for discharge home.

## 2023-02-07 NOTE — DISCHARGE NOTE PROVIDER - NSDCFUADDAPPT_GEN_ALL_CORE_FT
You should follow up with your PCP and cardiologist upon discharge. You should follow up with your PCP and cardiologist upon discharge.  You have an appointment with Dr. Villa, cardiologist on 3/13/23 at 12:15pm.   You should follow up with your PCP and cardiologist upon discharge.  You have an appointment with Dr. Villa, cardiologist on 3/13/23 at 12:15pm to discuss scheduling a PCI.        APPTS ARE READY TO BE MADE: [x] YES    Best Family or Patient Contact (if needed):    Additional Information about above appointments (if needed):    1:   2:   3:     Other comments or requests:    You should follow up with your PCP and cardiologist upon discharge.  You have an appointment with Dr. Villa, cardiologist on 3/13/23 at 12:15pm to discuss scheduling a PCI.        APPTS ARE READY TO BE MADE: [x] YES    Best Family or Patient Contact (if needed):    Additional Information about above appointments (if needed):    1:   2:   3:     Other comments or requests: Patient was provided with follow up request details and was advised to call to schedule follow up within specified time frame.

## 2023-02-07 NOTE — DISCHARGE NOTE PROVIDER - CARE PROVIDER_API CALL
Long Villa)  Internal Medicine  [address] [place] [address]  Phone: [phone]  Fax: [phone]  Follow Up Time: 2 weeks

## 2023-02-07 NOTE — DISCHARGE NOTE PROVIDER - NSDCCPCAREPLAN_GEN_ALL_CORE_FT
PRINCIPAL DISCHARGE DIAGNOSIS  Diagnosis: Shortness of breath  Assessment and Plan of Treatment: Cardiology was consulted and followed you throughout your admission. You had your AICD interrogated - it was functioning well and revealed no concerning events.  You underwent cardiac catheterization which showed...  You were cleared for discharge by cardiology.  You should follow up with your PCP and cardiologist upon discharge.       PRINCIPAL DISCHARGE DIAGNOSIS  Diagnosis: Shortness of breath  Assessment and Plan of Treatment: Cardiology was consulted and followed you throughout your admission. You had your AICD interrogated - it was functioning well and revealed no concerning events.  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  You were cleared for discharge by cardiology.  You should follow up with your PCP and cardiologist upon discharge.       PRINCIPAL DISCHARGE DIAGNOSIS  Diagnosis: Shortness of breath  Assessment and Plan of Treatment: Cardiology was consulted and followed you throughout your admission. You had your AICD interrogated - it was functioning well and revealed no concerning events.  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  You were cleared for discharge by cardiology.  You should follow up with your PCP and cardiologist upon discharge.  Follow up with cardiology to schedule a PCI.

## 2023-02-07 NOTE — DISCHARGE NOTE PROVIDER - NSDCMRMEDTOKEN_GEN_ALL_CORE_FT
amiodarone 100 mg oral tablet: QD  Coreg 6.25 mg oral tablet: 1 tab(s) orally 2 times a day   Cozaar 25 mg oral tablet: 1 tab(s) orally 2 times a day  Ecotrin Adult Low Strength 81 mg oral delayed release tablet: 1 tab(s) orally once a day  finasteride 5 mg oral tablet: 1 tab(s) orally once a day  Flomax 0.4 mg oral capsule: 1 cap(s) orally once a day  KlonoPIN 0.5 mg oral tablet: 1 tab(s) orally once a day in the morning  KlonoPIN 1 mg oral tablet: 1 tab(s) orally once a day (at bedtime)  Lexapro 20 mg oral tablet: 1 tab(s) orally once a day  Remeron 15 mg oral tablet: 1 tab(s) orally once a day (at bedtime)  Zocor 20 mg oral tablet: 1 tab(s) orally once a day (at bedtime)   acetaminophen 325 mg oral tablet: 2 tab(s) orally every 6 hours, As needed, Temp greater or equal to 38C (100.4F), Mild Pain (1 - 3)  amiodarone 100 mg oral tablet: QD  Coreg 6.25 mg oral tablet: 1 tab(s) orally 2 times a day   Cozaar 25 mg oral tablet: 1 tab(s) orally 2 times a day  Ecotrin Adult Low Strength 81 mg oral delayed release tablet: 1 tab(s) orally once a day  finasteride 5 mg oral tablet: 1 tab(s) orally once a day  Flomax 0.4 mg oral capsule: 1 cap(s) orally once a day  KlonoPIN 0.5 mg oral tablet: 1 tab(s) orally once a day in the morning  KlonoPIN 1 mg oral tablet: 1 tab(s) orally once a day (at bedtime)  Lexapro 20 mg oral tablet: 1 tab(s) orally once a day  Remeron 15 mg oral tablet: 1 tab(s) orally once a day (at bedtime)  Zocor 20 mg oral tablet: 1 tab(s) orally once a day (at bedtime)

## 2023-02-08 PROCEDURE — 73620 X-RAY EXAM OF FOOT: CPT | Mod: 26,RT

## 2023-02-08 PROCEDURE — 99232 SBSQ HOSP IP/OBS MODERATE 35: CPT

## 2023-02-08 RX ORDER — ACETAMINOPHEN 500 MG
1000 TABLET ORAL ONCE
Refills: 0 | Status: COMPLETED | OUTPATIENT
Start: 2023-02-08 | End: 2023-02-08

## 2023-02-08 RX ADMIN — CARVEDILOL PHOSPHATE 6.25 MILLIGRAM(S): 80 CAPSULE, EXTENDED RELEASE ORAL at 05:27

## 2023-02-08 RX ADMIN — ESCITALOPRAM OXALATE 20 MILLIGRAM(S): 10 TABLET, FILM COATED ORAL at 11:05

## 2023-02-08 RX ADMIN — TAMSULOSIN HYDROCHLORIDE 0.4 MILLIGRAM(S): 0.4 CAPSULE ORAL at 21:33

## 2023-02-08 RX ADMIN — FINASTERIDE 5 MILLIGRAM(S): 5 TABLET, FILM COATED ORAL at 21:33

## 2023-02-08 RX ADMIN — SIMVASTATIN 20 MILLIGRAM(S): 20 TABLET, FILM COATED ORAL at 21:33

## 2023-02-08 RX ADMIN — MIRTAZAPINE 15 MILLIGRAM(S): 45 TABLET, ORALLY DISINTEGRATING ORAL at 21:33

## 2023-02-08 RX ADMIN — Medication 0.5 MILLIGRAM(S): at 09:10

## 2023-02-08 RX ADMIN — Medication 81 MILLIGRAM(S): at 11:05

## 2023-02-08 RX ADMIN — ENOXAPARIN SODIUM 40 MILLIGRAM(S): 100 INJECTION SUBCUTANEOUS at 17:20

## 2023-02-08 RX ADMIN — Medication 3 MILLIGRAM(S): at 23:43

## 2023-02-08 RX ADMIN — Medication 400 MILLIGRAM(S): at 23:47

## 2023-02-08 RX ADMIN — AMIODARONE HYDROCHLORIDE 100 MILLIGRAM(S): 400 TABLET ORAL at 05:27

## 2023-02-08 RX ADMIN — Medication 1 MILLIGRAM(S): at 21:33

## 2023-02-08 RX ADMIN — LOSARTAN POTASSIUM 25 MILLIGRAM(S): 100 TABLET, FILM COATED ORAL at 05:27

## 2023-02-08 RX ADMIN — LOSARTAN POTASSIUM 25 MILLIGRAM(S): 100 TABLET, FILM COATED ORAL at 17:20

## 2023-02-08 RX ADMIN — CARVEDILOL PHOSPHATE 6.25 MILLIGRAM(S): 80 CAPSULE, EXTENDED RELEASE ORAL at 17:20

## 2023-02-09 ENCOUNTER — TRANSCRIPTION ENCOUNTER (OUTPATIENT)
Age: 88
End: 2023-02-09

## 2023-02-09 VITALS
SYSTOLIC BLOOD PRESSURE: 122 MMHG | OXYGEN SATURATION: 96 % | DIASTOLIC BLOOD PRESSURE: 67 MMHG | RESPIRATION RATE: 18 BRPM | TEMPERATURE: 98 F | HEART RATE: 59 BPM

## 2023-02-09 PROCEDURE — 83036 HEMOGLOBIN GLYCOSYLATED A1C: CPT

## 2023-02-09 PROCEDURE — 85014 HEMATOCRIT: CPT

## 2023-02-09 PROCEDURE — 80061 LIPID PANEL: CPT

## 2023-02-09 PROCEDURE — 86850 RBC ANTIBODY SCREEN: CPT

## 2023-02-09 PROCEDURE — 73620 X-RAY EXAM OF FOOT: CPT

## 2023-02-09 PROCEDURE — 82803 BLOOD GASES ANY COMBINATION: CPT

## 2023-02-09 PROCEDURE — 99285 EMERGENCY DEPT VISIT HI MDM: CPT

## 2023-02-09 PROCEDURE — 84443 ASSAY THYROID STIM HORMONE: CPT

## 2023-02-09 PROCEDURE — 82553 CREATINE MB FRACTION: CPT

## 2023-02-09 PROCEDURE — 86901 BLOOD TYPING SEROLOGIC RH(D): CPT

## 2023-02-09 PROCEDURE — 93458 L HRT ARTERY/VENTRICLE ANGIO: CPT

## 2023-02-09 PROCEDURE — 99239 HOSP IP/OBS DSCHRG MGMT >30: CPT

## 2023-02-09 PROCEDURE — 85027 COMPLETE CBC AUTOMATED: CPT

## 2023-02-09 PROCEDURE — 80053 COMPREHEN METABOLIC PANEL: CPT

## 2023-02-09 PROCEDURE — 71250 CT THORAX DX C-: CPT | Mod: MA

## 2023-02-09 PROCEDURE — 85018 HEMOGLOBIN: CPT

## 2023-02-09 PROCEDURE — C1887: CPT

## 2023-02-09 PROCEDURE — 85730 THROMBOPLASTIN TIME PARTIAL: CPT

## 2023-02-09 PROCEDURE — U0005: CPT

## 2023-02-09 PROCEDURE — 82435 ASSAY OF BLOOD CHLORIDE: CPT

## 2023-02-09 PROCEDURE — 71045 X-RAY EXAM CHEST 1 VIEW: CPT

## 2023-02-09 PROCEDURE — 87637 SARSCOV2&INF A&B&RSV AMP PRB: CPT

## 2023-02-09 PROCEDURE — 85610 PROTHROMBIN TIME: CPT

## 2023-02-09 PROCEDURE — 84484 ASSAY OF TROPONIN QUANT: CPT

## 2023-02-09 PROCEDURE — 83605 ASSAY OF LACTIC ACID: CPT

## 2023-02-09 PROCEDURE — 82947 ASSAY GLUCOSE BLOOD QUANT: CPT

## 2023-02-09 PROCEDURE — U0003: CPT

## 2023-02-09 PROCEDURE — 82962 GLUCOSE BLOOD TEST: CPT

## 2023-02-09 PROCEDURE — 82330 ASSAY OF CALCIUM: CPT

## 2023-02-09 PROCEDURE — C8929: CPT

## 2023-02-09 PROCEDURE — 84295 ASSAY OF SERUM SODIUM: CPT

## 2023-02-09 PROCEDURE — 86900 BLOOD TYPING SEROLOGIC ABO: CPT

## 2023-02-09 PROCEDURE — 84132 ASSAY OF SERUM POTASSIUM: CPT

## 2023-02-09 PROCEDURE — 80048 BASIC METABOLIC PNL TOTAL CA: CPT

## 2023-02-09 PROCEDURE — C1894: CPT

## 2023-02-09 PROCEDURE — 83690 ASSAY OF LIPASE: CPT

## 2023-02-09 PROCEDURE — 83735 ASSAY OF MAGNESIUM: CPT

## 2023-02-09 PROCEDURE — 83880 ASSAY OF NATRIURETIC PEPTIDE: CPT

## 2023-02-09 PROCEDURE — 36415 COLL VENOUS BLD VENIPUNCTURE: CPT

## 2023-02-09 PROCEDURE — 85025 COMPLETE CBC W/AUTO DIFF WBC: CPT

## 2023-02-09 PROCEDURE — C1769: CPT

## 2023-02-09 RX ORDER — ACETAMINOPHEN 500 MG
2 TABLET ORAL
Qty: 0 | Refills: 0 | DISCHARGE
Start: 2023-02-09

## 2023-02-09 RX ORDER — CLONAZEPAM 1 MG
1 TABLET ORAL AT BEDTIME
Refills: 0 | Status: DISCONTINUED | OUTPATIENT
Start: 2023-02-09 | End: 2023-02-09

## 2023-02-09 RX ADMIN — ESCITALOPRAM OXALATE 20 MILLIGRAM(S): 10 TABLET, FILM COATED ORAL at 12:06

## 2023-02-09 RX ADMIN — CARVEDILOL PHOSPHATE 6.25 MILLIGRAM(S): 80 CAPSULE, EXTENDED RELEASE ORAL at 05:04

## 2023-02-09 RX ADMIN — Medication 81 MILLIGRAM(S): at 12:06

## 2023-02-09 RX ADMIN — AMIODARONE HYDROCHLORIDE 100 MILLIGRAM(S): 400 TABLET ORAL at 05:04

## 2023-02-09 RX ADMIN — Medication 1000 MILLIGRAM(S): at 00:02

## 2023-02-09 RX ADMIN — LOSARTAN POTASSIUM 25 MILLIGRAM(S): 100 TABLET, FILM COATED ORAL at 05:04

## 2023-02-09 RX ADMIN — Medication 0.5 MILLIGRAM(S): at 08:10

## 2023-02-09 NOTE — PROGRESS NOTE ADULT - PROBLEM SELECTOR PLAN 4
cont home klonopin, remeron, lexapro.
cont home klonopin, remeron, lexapro.
continue home cozaar and coreg
cont home klonopin, remeron, lexapro.
cont home klonopin, remeron, lexapro.

## 2023-02-09 NOTE — PROVIDER CONTACT NOTE (OTHER) - ASSESSMENT
pt A&Ox4, VSS, Denies chest pain. pt complaining of right foot pain and spasms. pedal pulse present, no swelling off the right foot.

## 2023-02-09 NOTE — PROGRESS NOTE ADULT - NSPROGADDITIONALINFOA_GEN_ALL_CORE
disposition: dc home 2/9  discussed with wife awilda   discussed with NP    discharge today  1 hour spent in preparation of discharge    Malgorzata Fierro D.O.  available on [facility] teams
full code  activity as tolerated   vte ppx w lovenox  regular diet
disposition: date of pci pending- awaiting cards    Malgorzata Fierro D.O.  available on MS teams
disposition: [facility] 2/6    Malgorzata Fierro D.O.  available on MS teams
disposition: [facility] 2/7, if normal dc today if not dc tomorrow    Malgorzata Fierro D.O.  available on MS teams

## 2023-02-09 NOTE — PROGRESS NOTE ADULT - PROBLEM SELECTOR PROBLEM 3
Thoracic ascending aortic aneurysm
HLD (hyperlipidemia)

## 2023-02-09 NOTE — DISCHARGE NOTE NURSING/CASE MANAGEMENT/SOCIAL WORK - NSDCFUADDAPPT_GEN_ALL_CORE_FT
You should follow up with your PCP and cardiologist upon discharge.  You have an appointment with Dr. Villa, cardiologist on 3/13/23 at 12:15pm to discuss scheduling a PCI.        APPTS ARE READY TO BE MADE: [x] YES    Best Family or Patient Contact (if needed):    Additional Information about above appointments (if needed):    1:   2:   3:     Other comments or requests:

## 2023-02-09 NOTE — DISCHARGE NOTE NURSING/CASE MANAGEMENT/SOCIAL WORK - PATIENT PORTAL LINK FT
You can access the FollowMyHealth Patient Portal offered by [facility] by registering at the following website: [email or web address]. By joining [employer]’s FollowMyHealth portal, you will also be able to view your health information using other applications (apps) compatible with our system.

## 2023-02-09 NOTE — PROGRESS NOTE ADULT - PROBLEM SELECTOR PROBLEM 5
HLD (hyperlipidemia)
BPH (benign prostatic hyperplasia)

## 2023-02-09 NOTE — PROGRESS NOTE ADULT - PROBLEM SELECTOR PLAN 2
continue home cozaar and coreg
h/o high pvc burden  current ekg  shows a paced rhythm with 1st degree AV block, occasional pvcs, and repolarization changes of lvh; prior in 2019 showed similar findings but with bigeminy rather than occasional pvcs  Last ICD interrogation with 80% AP, no events   continue home amiodarone and coreg.

## 2023-02-09 NOTE — DISCHARGE NOTE NURSING/CASE MANAGEMENT/SOCIAL WORK - NSPROEXTENSIONSOFSELF_GEN_A_NUR
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)  Clinical Status: stable  Renal Function:  SCr: 0 92 mg/dL  CrCl: 99 6 mL/min  Renal replacement: Not on Dialysis  Days of Therapy: 2  Current Dose :  1000mg IV q12h  Vancomycin Plan:   Estimated AUC: 446 mcg*hr/mL  Estimated Trough: 14 3 mcg/mL  Next Level: 1/9/23 at 0600 with am labs  Renal Function Monitoring: Daily BMP and [place] will continue to follow closely for s/sx of nephrotoxicity, infusion reactions and appropriateness of therapy  BMP and CBC will be ordered per protocol  We will continue to follow the patient’s culture results and clinical progress daily      Usha Argueta, Pharmacist none

## 2023-02-09 NOTE — PROGRESS NOTE ADULT - PROBLEM SELECTOR PROBLEM 2
HTN (hypertension)
PVCs (premature ventricular contractions)
HTN (hypertension)

## 2023-02-09 NOTE — PROGRESS NOTE ADULT - SUBJECTIVE AND OBJECTIVE BOX
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 alleviated by rest, exacerbated by exertion; over the last couple of weeks, episodes of sob have become longer and more frequent. patient denies associated chest pain, palpitations, loc, dizziness/lightheadedness, cough, fever, wheezing, abdominal discomfort, dysuria, generalized fatigue, poor po intake, bleeding. patient grew concerned so visited his cardiologist, who ddocumented the following; "dyspnea...Unclear etiology...no evidence of fluid overload. Chronic amio therapy although he has been on a lower dose. CT chest to eval lung parenchyma (also assess ascending aortic size)...Repeat TTE...Consdier ischemic eval thereafter...?Dyspnea as anginal equivalent." patient was to undergo testing this upcoming monday on 2/6; however, as symptoms continued to worsen, he presents to [facility] er for further evaluation.  (04 Feb 2023 04:32)      INTERIM EVENTS:Patient seen at bedside ,interim events noted.Awake lying in bed is not orthopneac though still has waves of dyspnea      [facility] -reviewed admission note, no change since admission  HEART FAILURE: Acute[ ]Chronic[x ] Systolic[x ] Diastolic[ ] Combined Systolic and Diastolic[ ]  CAD[ ] CABG[ ] PCI[ ]  DEVICES[ ] PPM[ ] ICD[ ] ILR[ ]  ATRIAL FIBRILLATION[ ] Paroxysmal[ ] Permanent[ ] CHADS2-[  ]  RADHA[ ] CKD1[ ] CKD2[ ] CKD3[ ] CKD4[ ] ESRD[ ]  COPD[ ] HTN[ ]   DM[ ] Type1[ ] Type 2[ ]   CVA[ ] Paresis[ ]    AMBULATION: Assisted[ ] Cane/walker[ ] Independent[ ]    MEDICATIONS  (STANDING):  aMIOdarone    Tablet 100 milliGRAM(s) Oral daily  aspirin enteric coated 81 milliGRAM(s) Oral daily  carvedilol 6.25 milliGRAM(s) Oral every 12 hours  clonazePAM  Tablet 1 milliGRAM(s) Oral at bedtime  clonazePAM  Tablet 0.5 milliGRAM(s) Oral <User Schedule>  enoxaparin Injectable 40 milliGRAM(s) SubCutaneous every 24 hours  escitalopram 20 milliGRAM(s) Oral daily  finasteride 5 milliGRAM(s) Oral at bedtime  influenza  Vaccine (HIGH DOSE) 0.7 milliLiter(s) IntraMuscular once  losartan 25 milliGRAM(s) Oral two times a day  mirtazapine 15 milliGRAM(s) Oral at bedtime  simvastatin 20 milliGRAM(s) Oral at bedtime  tamsulosin 0.4 milliGRAM(s) Oral at bedtime    MEDICATIONS  (PRN):  acetaminophen     Tablet .. 650 milliGRAM(s) Oral every 6 hours PRN Temp greater or equal to 38C (100.4F), Mild Pain (1 - 3)  aluminum hydroxide/magnesium hydroxide/simethicone Suspension 30 milliLiter(s) Oral every 4 hours PRN Dyspepsia  melatonin 3 milliGRAM(s) Oral at bedtime PRN Insomnia  ondansetron Injectable 4 milliGRAM(s) IV Push every 8 hours PRN Nausea and/or Vomiting            REVIEW OF SYSTEMS:  Constitutional: [ ] fever, [ ]weight loss,  [ ]fatigue [ ]weight gain  Eyes: [ ] visual changes  Respiratory: [x ]shortness of breath;  [ ] cough, [ ]wheezing, [ ]chills, [ ]hemoptysis  Cardiovascular: [ ] chest pain, [ ]palpitations, [ ]dizziness,  [ ]leg swelling[ ]orthopnea[ ]PND  Gastrointestinal: [ ] abdominal pain, [ ]nausea, [ ]vomiting,  [ ]diarrhea [ ]Constipation [ ]Melena  Genitourinary: [ ] dysuria, [ ] hematuria [ ]Sparks  Neurologic: [ ] headaches [ ] tremors[ ]weakness [ ]Paralysis Right[ ] Left[ ]  Skin: [ ] itching, [ ]burning, [ ] rashes  Endocrine: [ ] heat or cold intolerance  Musculoskeletal: [ ] joint pain or swelling; [ ] muscle, back, or extremity pain  Psychiatric: [ ] depression, [ ]anxiety, [ ]mood swings, or [ ]difficulty sleeping  Hematologic: [ ] easy bruising, [ ] bleeding gums    [ ] All remaining systems negative except as per above.   [ ]Unable to obtain.  [x] No change in ROS since admission      Vital Signs Last 24 Hrs  T(C): 36.5 (06 Feb 2023 04:33), Max: 36.6 (05 Feb 2023 12:55)  T(F): 97.7 (06 Feb 2023 04:33), Max: 97.8 (05 Feb 2023 12:55)  HR: 56 (06 Feb 2023 05:02) (56 - 62)  BP: 118/47 (06 Feb 2023 04:33) (118/47 - 148/72)  RR: 18 (06 Feb 2023 04:33) (18 - 18)  SpO2: 94% (06 Feb 2023 04:33) (94% - 98%)    Parameters below as of 06 Feb 2023 04:33  Patient On (Oxygen Delivery Method): room air      I&O's Summary    05 Feb 2023 07:01  -  06 Feb 2023 07:00  --------------------------------------------------------  IN: 1800 mL / OUT: 0 mL / NET: 1800 mL        PHYSICAL EXAM:  General: No acute distress BMI-26  HEENT: EOMI, PERRL  Neck: Supple, [ ] JVD  Lungs: Equal air entry bilaterally; [ ] rales [ ] wheezing [ ] rhonchi  Heart: Regular rate and rhythm; [x ] murmur   2/6 [ x] systolic [ ] diastolic [ ] radiation[ ] rubs [ ]  gallops  Abdomen: Nontender, bowel sounds present  Extremities: No clubbing, cyanosis, [ ] edema [ ]Pulses  equal and intact  Nervous system:  Alert & Oriented X3, no focal deficits  Psychiatric: Normal affect  Skin: No rashes or lesions    LABS:  02-06    138  |  105  |  17  ----------------------------<  82  4.0   |  24  |  0.87    Ca    8.8      06 Feb 2023 06:33      Creatinine Trend: 0.87<--, 0.94<--, 0.86<--                        13.4   7.00  )-----------( 203      ( 06 Feb 2023 06:33 )             39.8       ECHO:  02/03/2023  Conclusions:  1. Mild mitral regurgitation.  2. Moderate aortic regurgitation.  3. Dilated Aortic Root: 4.7 cm. Ascending Aorta: 4.2 cm.  4. Severely dilated left atrium.  LA volume index = 55 cc/m2.  5. Mild left ventricular enlargement.  6. Endocardial visualization enhanced with intravenous injection of Ultrasonic Enhancing Agent (Definity).  Moderate segmental left ventricular dysfunction. The mid to basal inferoseptal/mid to basal inferolateral wall are akinetic.  7. Increased E/e'  is consistent with elevated left ventricular filling pressure.  8. A device wire is noted in the right heart. Severe right atrial enlargement.  9. Normal right ventricular size and function.  10. Mild tricuspid regurgitation.  *** No previous Echo exam.            Study Date: 2/6/2019  Mild global left ventricular systolic dysfunction. EF 49%      
no events overnight.    GENERAL: No fevers, no chills.  EYES: No blurry vision,  No photophobia  ENT: No sore throat.  No dysphagia  Cardiovascular: No chest pain, palpitations, orthopnea  Pulmonary: No cough, no wheezing. No shortness of breath  Gastrointestinal: No abdominal pain, no diarrhea, no constipation.  Musculoskeletal: No weakness.  No myalgias.  Dermatology:  No rashes.  Neuro: No Headache.  No vertigo.  No dizziness.  Psych: No anxiety, no depression.  Denies suicidal thoughts.    MEDICATIONS  (STANDING):  aMIOdarone    Tablet 100 milliGRAM(s) Oral daily  aspirin enteric coated 81 milliGRAM(s) Oral daily  carvedilol 6.25 milliGRAM(s) Oral every 12 hours  clonazePAM  Tablet 1 milliGRAM(s) Oral at bedtime  clonazePAM  Tablet 0.5 milliGRAM(s) Oral <User Schedule>  enoxaparin Injectable 40 milliGRAM(s) SubCutaneous every 24 hours  escitalopram 20 milliGRAM(s) Oral daily  finasteride 5 milliGRAM(s) Oral at bedtime  influenza  Vaccine (HIGH DOSE) 0.7 milliLiter(s) IntraMuscular once  losartan 25 milliGRAM(s) Oral two times a day  mirtazapine 15 milliGRAM(s) Oral at bedtime  simvastatin 20 milliGRAM(s) Oral at bedtime  tamsulosin 0.4 milliGRAM(s) Oral at bedtime    MEDICATIONS  (PRN):  acetaminophen     Tablet .. 650 milliGRAM(s) Oral every 6 hours PRN Temp greater or equal to 38C (100.4F), Mild Pain (1 - 3)  aluminum hydroxide/magnesium hydroxide/simethicone Suspension 30 milliLiter(s) Oral every 4 hours PRN Dyspepsia  melatonin 3 milliGRAM(s) Oral at bedtime PRN Insomnia  ondansetron Injectable 4 milliGRAM(s) IV Push every 8 hours PRN Nausea and/or Vomiting    Vital Signs Last 24 Hrs  T(C): 36.8 (08 Feb 2023 11:30), Max: 36.8 (08 Feb 2023 11:30)  T(F): 98.3 (08 Feb 2023 11:30), Max: 98.3 (08 Feb 2023 11:30)  HR: 56 (08 Feb 2023 11:30) (54 - 64)  BP: 110/56 (08 Feb 2023 11:30) (110/56 - 158/67)  BP(mean): --  RR: 18 (08 Feb 2023 11:30) (16 - 18)  SpO2: 96% (08 Feb 2023 11:30) (96% - 99%)    Parameters below as of 08 Feb 2023 11:30  Patient On (Oxygen Delivery Method): room air    CONSTITUTIONAL: NAD  EYES: PERRLA; conjunctiva and sclera clear  ENMT: Moist oral mucosa, no pharyngeal injection or exudates; normal dentition  NECK: Supple, no palpable masses; no thyromegaly  RESPIRATORY: Normal respiratory effort; lungs are clear to auscultation bilaterally  CARDIOVASCULAR: Regular rate and rhythm, normal S1 and S2, no murmur/rub/gallop; No lower extremity edema; Peripheral pulses are 2+ bilaterally  ABDOMEN: Nontender to palpation, normoactive bowel sounds, no rebound/guarding; No hepatosplenomegaly  MUSCULOSKELETAL:  Normal gait; no clubbing or cyanosis of digits; no joint swelling or tenderness to palpation  PSYCH: A+O to person, place, and time; affect appropriate  SKIN: No rashes; no palpable lesions    .  LABS:                         13.3   7.06  )-----------( 204      ( 07 Feb 2023 05:41 )             38.9     02-07    136  |  105  |  18  ----------------------------<  86  4.3   |  21<L>  |  0.88    Ca    8.8      07 Feb 2023 05:40                RADIOLOGY, EKG & ADDITIONAL TESTS: Reviewed. 
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 alleviated by rest, exacerbated by exertion; over the last couple of weeks, episodes of sob have become longer and more frequent. patient denies associated chest pain, palpitations, loc, dizziness/lightheadedness, cough, fever, wheezing, abdominal discomfort, dysuria, generalized fatigue, poor po intake, bleeding. patient grew concerned so visited his cardiologist, who ddocumented the following; "dyspnea...Unclear etiology...no evidence of fluid overload. Chronic amio therapy although he has been on a lower dose. CT chest to eval lung parenchyma (also assess ascending aortic size)...Repeat TTE...Consdier ischemic eval thereafter...?Dyspnea as anginal equivalent." patient was to undergo testing this upcoming monday on 2/6; however, as symptoms continued to worsen, he presents to [facility] er for further evaluation.  (04 Feb 2023 04:32)      INTERIM EVENTS:Patient seen at bedside ,interim events noted.Awake alert Cardiac cath rescheduled for today no chest pain       [facility] -reviewed admission note, no change since admission  HEART FAILURE: Acute[x ]Chronic[ ] Systolic[ ] Diastolic[x ] Combined Systolic and Diastolic[ ]  CAD[x ] CABG[ ] PCI[x ]  DEVICES[ ] PPM[ ] ICD[x ] ILR[ ]  ATRIAL FIBRILLATION[ ] Paroxysmal[ ] Permanent[ ] CHADS2-[  ]  RADHA[ ] CKD1[ ] CKD2[ ] CKD3[ ] CKD4[ ] ESRD[ ]  COPD[ ] HTN[ ]   DM[ ] Type1[ ] Type 2[ ]   CVA[ ] Paresis[ ]    AMBULATION: Assisted[ ] Cane/walker[ ] Independent[x ]    MEDICATIONS  (STANDING):  aMIOdarone    Tablet 100 milliGRAM(s) Oral daily  aspirin enteric coated 81 milliGRAM(s) Oral daily  carvedilol 6.25 milliGRAM(s) Oral every 12 hours  clonazePAM  Tablet 1 milliGRAM(s) Oral at bedtime  clonazePAM  Tablet 0.5 milliGRAM(s) Oral <User Schedule>  enoxaparin Injectable 40 milliGRAM(s) SubCutaneous every 24 hours  escitalopram 20 milliGRAM(s) Oral daily  finasteride 5 milliGRAM(s) Oral at bedtime  influenza  Vaccine (HIGH DOSE) 0.7 milliLiter(s) IntraMuscular once  losartan 25 milliGRAM(s) Oral two times a day  mirtazapine 15 milliGRAM(s) Oral at bedtime  simvastatin 20 milliGRAM(s) Oral at bedtime  tamsulosin 0.4 milliGRAM(s) Oral at bedtime    MEDICATIONS  (PRN):  acetaminophen     Tablet .. 650 milliGRAM(s) Oral every 6 hours PRN Temp greater or equal to 38C (100.4F), Mild Pain (1 - 3)  aluminum hydroxide/magnesium hydroxide/simethicone Suspension 30 milliLiter(s) Oral every 4 hours PRN Dyspepsia  melatonin 3 milliGRAM(s) Oral at bedtime PRN Insomnia  ondansetron Injectable 4 milliGRAM(s) IV Push every 8 hours PRN Nausea and/or Vomiting            REVIEW OF SYSTEMS:  Constitutional: [ ] fever, [ ]weight loss,  [ ]fatigue [ ]weight gain  Eyes: [ ] visual changes  Respiratory: [x ]shortness of breath;  [ ] cough, [ ]wheezing, [ ]chills, [ ]hemoptysis  Cardiovascular: [ ] chest pain, [ ]palpitations, [ ]dizziness,  [ ]leg swelling[ ]orthopnea[ ]PND  Gastrointestinal: [ ] abdominal pain, [ ]nausea, [ ]vomiting,  [ ]diarrhea [ ]Constipation [ ]Melena  Genitourinary: [ ] dysuria, [ ] hematuria [ ]Sparks  Neurologic: [ ] headaches [ ] tremors[ ]weakness [ ]Paralysis Right[ ] Left[ ]  Skin: [ ] itching, [ ]burning, [ ] rashes  Endocrine: [ ] heat or cold intolerance  Musculoskeletal: [ ] joint pain or swelling; [ ] muscle, back, or extremity pain  Psychiatric: [ ] depression, [ ]anxiety, [ ]mood swings, or [ ]difficulty sleeping  Hematologic: [ ] easy bruising, [ ] bleeding gums    [ ] All remaining systems negative except as per above.   [ ]Unable to obtain.  [x] No change in ROS since admission      Vital Signs Last 24 Hrs  T(C): 36.5 (07 Feb 2023 04:56), Max: 36.6 (06 Feb 2023 21:54)  T(F): 97.7 (07 Feb 2023 04:56), Max: 97.8 (06 Feb 2023 21:54)  HR: 68 (07 Feb 2023 04:56) (61 - 69)  BP: 119/60 (07 Feb 2023 04:56) (119/60 - 145/89)  RR: 18 (07 Feb 2023 04:56) (18 - 18)  SpO2: 97% (07 Feb 2023 04:56) (92% - 97%)    Parameters below as of 07 Feb 2023 04:56  Patient On (Oxygen Delivery Method): room air      I&O's Summary    06 Feb 2023 07:01  -  07 Feb 2023 07:00  --------------------------------------------------------  IN: 236 mL / OUT: 800 mL / NET: -564 mL        PHYSICAL EXAM:  General: No acute distress BMI-27  HEENT: EOMI, PERRL  Neck: Supple, [ ] JVD  Lungs: Equal air entry bilaterally; [ ] rales [ ] wheezing [ ] rhonchi  Heart: Regular rate and rhythm; [x ] murmur   2/6 [ x] systolic [ ] diastolic [ ] radiation[ ] rubs [ ]  gallops  Abdomen: Nontender, bowel sounds present  Extremities: No clubbing, cyanosis, [ ] edema [ ]Pulses  equal and intact  Nervous system:  Alert & Oriented X3, no focal deficits  Psychiatric: Normal affect  Skin: No rashes or lesions    LABS:  02-07    136  |  105  |  18  ----------------------------<  86  4.3   |  21<L>  |  0.88    Ca    8.8      07 Feb 2023 05:40      Creatinine Trend: 0.88<--, 0.87<--, 0.94<--, 0.86<--                        13.3   7.06  )-----------( 204      ( 07 Feb 2023 05:41 )             38.9     PT/INR - ( 06 Feb 2023 06:33 )   PT: 13.4 sec;   INR: 1.16 ratio         PTT - ( 06 Feb 2023 06:33 )  PTT:31.5 sec    ECHO:  02/03/2023  Conclusions:  1. Mild mitral regurgitation.  2. Moderate aortic regurgitation.  3. Dilated Aortic Root: 4.7 cm. Ascending Aorta: 4.2 cm.  4. Severely dilated left atrium.  LA volume index = 55 cc/m2.  5. Mild left ventricular enlargement.  6. Endocardial visualization enhanced with intravenous injection of Ultrasonic Enhancing Agent (Definity).  Moderate segmental left ventricular dysfunction. The mid to basal inferoseptal/mid to basal inferolateral wall are akinetic.  7. Increased E/e'  is consistent with elevated left ventricular filling pressure.  8. A device wire is noted in the right heart. Severe right atrial enlargement.  9. Normal right ventricular size and function.  10. Mild tricuspid regurgitation.  *** No previous Echo exam.      Study Date: 2/6/2019  Mild global left ventricular systolic dysfunction. EF 49%      · Procedure Name	ICD Interrogation Note  · Additional Procedure Details	Indication: SOB    Presenting rhythm: AS-VS with intermittent APacing and PVCs  Underlying rhythm: sinus rhythm with ectopy  Pt is not pacemaker dependent.     Estimated remaining battery longevity: 20 months.     AP 93.4%;  0.3%    Events: None    Normal functioning ICD, no events to correlate with patient's symptoms.     - RYAN Nunez  [phone]      
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 alleviated by rest, exacerbated by exertion; over the last couple of weeks, episodes of sob have become longer and more frequent. patient denies associated chest pain, palpitations, loc, dizziness/lightheadedness, cough, fever, wheezing, abdominal discomfort, dysuria, generalized fatigue, poor po intake, bleeding. patient grew concerned so visited his cardiologist, who ddocumented the following; "dyspnea...Unclear etiology...no evidence of fluid overload. Chronic amio therapy although he has been on a lower dose. CT chest to eval lung parenchyma (also assess ascending aortic size)...Repeat TTE...Consdier ischemic eval thereafter...?Dyspnea as anginal equivalent." patient was to undergo testing this upcoming monday on 2/6; however, as symptoms continued to worsen, he presents to [facility] er for further evaluation.  (04 Feb 2023 04:32)      INTERIM EVENTS:Patient seen at bedside ,interim events noted.Awake alert had cardiac cath RCA  at prior stent       [facility] -reviewed admission note, no change since admission  HEART FAILURE: Acute[ ]Chronic[x ] Systolic[x ] Diastolic[ ] Combined Systolic and Diastolic[ ]  CAD[ ] CABG[ ] PCI[ ]  DEVICES[ ] PPM[ ] ICD[ ] ILR[ ]  ATRIAL FIBRILLATION[ ] Paroxysmal[ ] Permanent[ ] CHADS2-[  ]  RADHA[ ] CKD1[ ] CKD2[ ] CKD3[ ] CKD4[ ] ESRD[ ]  COPD[ ] HTN[ ]   DM[ ] Type1[ ] Type 2[ ]   CVA[ ] Paresis[ ]    AMBULATION: Assisted[ ] Cane/walker[ ] Independent[ ]    MEDICATIONS  (STANDING):  aMIOdarone    Tablet 100 milliGRAM(s) Oral daily  aspirin enteric coated 81 milliGRAM(s) Oral daily  carvedilol 6.25 milliGRAM(s) Oral every 12 hours  clonazePAM  Tablet 1 milliGRAM(s) Oral at bedtime  clonazePAM  Tablet 0.5 milliGRAM(s) Oral <User Schedule>  enoxaparin Injectable 40 milliGRAM(s) SubCutaneous every 24 hours  escitalopram 20 milliGRAM(s) Oral daily  finasteride 5 milliGRAM(s) Oral at bedtime  influenza  Vaccine (HIGH DOSE) 0.7 milliLiter(s) IntraMuscular once  losartan 25 milliGRAM(s) Oral two times a day  mirtazapine 15 milliGRAM(s) Oral at bedtime  simvastatin 20 milliGRAM(s) Oral at bedtime  tamsulosin 0.4 milliGRAM(s) Oral at bedtime    MEDICATIONS  (PRN):  acetaminophen     Tablet .. 650 milliGRAM(s) Oral every 6 hours PRN Temp greater or equal to 38C (100.4F), Mild Pain (1 - 3)  aluminum hydroxide/magnesium hydroxide/simethicone Suspension 30 milliLiter(s) Oral every 4 hours PRN Dyspepsia  melatonin 3 milliGRAM(s) Oral at bedtime PRN Insomnia  ondansetron Injectable 4 milliGRAM(s) IV Push every 8 hours PRN Nausea and/or Vomiting            REVIEW OF SYSTEMS:  Constitutional: [ ] fever, [ ]weight loss,  [ ]fatigue [ ]weight gain  Eyes: [ ] visual changes  Respiratory: [ ]shortness of breath;  [ ] cough, [ ]wheezing, [ ]chills, [ ]hemoptysis  Cardiovascular: [ ] chest pain, [ ]palpitations, [ ]dizziness,  [ ]leg swelling[ ]orthopnea[ ]PND  Gastrointestinal: [ ] abdominal pain, [ ]nausea, [ ]vomiting,  [ ]diarrhea [ ]Constipation [ ]Melena  Genitourinary: [ ] dysuria, [ ] hematuria [ ]Sparks  Neurologic: [ ] headaches [ ] tremors[ ]weakness [ ]Paralysis Right[ ] Left[ ]  Skin: [ ] itching, [ ]burning, [ ] rashes  Endocrine: [ ] heat or cold intolerance  Musculoskeletal: [ ] joint pain or swelling; [ ] muscle, back, or extremity pain  Psychiatric: [ ] depression, [ ]anxiety, [ ]mood swings, or [ ]difficulty sleeping  Hematologic: [ ] easy bruising, [ ] bleeding gums    [ ] All remaining systems negative except as per above.   [ ]Unable to obtain.  [x] No change in ROS since admission      Vital Signs Last 24 Hrs  T(C): 36.7 (08 Feb 2023 04:57), Max: 36.7 (08 Feb 2023 04:57)  T(F): 98 (08 Feb 2023 04:57), Max: 98 (08 Feb 2023 04:57)  HR: 62 (08 Feb 2023 04:57) (54 - 64)  BP: 132/64 (08 Feb 2023 04:57) (130/62 - 158/67)  RR: 18 (08 Feb 2023 04:57) (16 - 18)  SpO2: 98% (08 Feb 2023 04:57) (95% - 99%)    Parameters below as of 08 Feb 2023 04:57  Patient On (Oxygen Delivery Method): room air      I&O's Summary    07 Feb 2023 07:01  -  08 Feb 2023 07:00  --------------------------------------------------------  IN: 720 mL / OUT: 325 mL / NET: 395 mL        PHYSICAL EXAM:  General: No acute distress BMI-28  HEENT: EOMI, PERRL  Neck: Supple, [ ] JVD  Lungs: Equal air entry bilaterally; [ ] rales [ ] wheezing [ ] rhonchi  Heart: Regular rate and rhythm; [x ] murmur   2/6 [ x] systolic [ ] diastolic [ ] radiation[ ] rubs [ ]  gallops  Abdomen: Nontender, bowel sounds present  Extremities: No clubbing, cyanosis, [ ] edema [ ]Pulses  equal and intact  Nervous system:  Alert & Oriented X3, no focal deficits  Psychiatric: Normal affect  Skin: No rashes or lesions    LABS:  02-07    136  |  105  |  18  ----------------------------<  86  4.3   |  21<L>  |  0.88    Ca    8.8      07 Feb 2023 05:40      Creatinine Trend: 0.88<--, 0.87<--, 0.94<--, 0.86<--                        13.3   7.06  )-----------( 204      ( 07 Feb 2023 05:41 )             38.9               
no events overnight.    GENERAL: No fevers, no chills.  EYES: No blurry vision,  No photophobia  ENT: No sore throat.  No dysphagia  Cardiovascular: No chest pain, palpitations, orthopnea  Pulmonary: No cough, no wheezing. No shortness of breath  Gastrointestinal: No abdominal pain, no diarrhea, no constipation.  Musculoskeletal: No weakness.  No myalgias.  Dermatology:  No rashes.  Neuro: No Headache.  No vertigo.  No dizziness.  Psych: No anxiety, no depression.  Denies suicidal thoughts.    MEDICATIONS  (STANDING):  aMIOdarone    Tablet 100 milliGRAM(s) Oral daily  aspirin enteric coated 81 milliGRAM(s) Oral daily  carvedilol 6.25 milliGRAM(s) Oral every 12 hours  clonazePAM  Tablet 1 milliGRAM(s) Oral at bedtime  enoxaparin Injectable 40 milliGRAM(s) SubCutaneous every 24 hours  escitalopram 20 milliGRAM(s) Oral daily  finasteride 5 milliGRAM(s) Oral at bedtime  influenza  Vaccine (HIGH DOSE) 0.7 milliLiter(s) IntraMuscular once  losartan 25 milliGRAM(s) Oral two times a day  mirtazapine 15 milliGRAM(s) Oral at bedtime  simvastatin 20 milliGRAM(s) Oral at bedtime  tamsulosin 0.4 milliGRAM(s) Oral at bedtime    MEDICATIONS  (PRN):  acetaminophen     Tablet .. 650 milliGRAM(s) Oral every 6 hours PRN Temp greater or equal to 38C (100.4F), Mild Pain (1 - 3)  aluminum hydroxide/magnesium hydroxide/simethicone Suspension 30 milliLiter(s) Oral every 4 hours PRN Dyspepsia  melatonin 3 milliGRAM(s) Oral at bedtime PRN Insomnia  ondansetron Injectable 4 milliGRAM(s) IV Push every 8 hours PRN Nausea and/or Vomiting    Vital Signs Last 24 Hrs  T(C): 36.6 (09 Feb 2023 08:00), Max: 36.6 (09 Feb 2023 04:08)  T(F): 97.9 (09 Feb 2023 08:00), Max: 97.9 (09 Feb 2023 08:00)  HR: 59 (09 Feb 2023 08:00) (59 - 65)  BP: 122/67 (09 Feb 2023 08:00) (109/62 - 140/66)  BP(mean): --  RR: 18 (09 Feb 2023 08:00) (18 - 18)  SpO2: 96% (09 Feb 2023 08:00) (96% - 97%)    Parameters below as of 09 Feb 2023 08:00  Patient On (Oxygen Delivery Method): room air    CONSTITUTIONAL: NAD  EYES: PERRLA; conjunctiva and sclera clear  ENMT: Moist oral mucosa, no pharyngeal injection or exudates; normal dentition  NECK: Supple, no palpable masses; no thyromegaly  RESPIRATORY: Normal respiratory effort; lungs are clear to auscultation bilaterally  CARDIOVASCULAR: Regular rate and rhythm, normal S1 and S2, no murmur/rub/gallop; No lower extremity edema; Peripheral pulses are 2+ bilaterally  ABDOMEN: Nontender to palpation, normoactive bowel sounds, no rebound/guarding; No hepatosplenomegaly  MUSCULOSKELETAL:  Normal gait; no clubbing or cyanosis of digits; no joint swelling or tenderness to palpation  PSYCH: A+O to person, place, and time; affect appropriate  SKIN: No rashes; no palpable lesions    .  LABS:                     RADIOLOGY, EKG & ADDITIONAL TESTS: Reviewed. 
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 alleviated by rest, exacerbated by exertion; over the last couple of weeks, episodes of sob have become longer and more frequent. patient denies associated chest pain, palpitations, loc, dizziness/lightheadedness, cough, fever, wheezing, abdominal discomfort, dysuria, generalized fatigue, poor po intake, bleeding. patient grew concerned so visited his cardiologist, who ddocumented the following; "dyspnea...Unclear etiology...no evidence of fluid overload. Chronic amio therapy although he has been on a lower dose. CT chest to eval lung parenchyma (also assess ascending aortic size)...Repeat TTE...Consdier ischemic eval thereafter...?Dyspnea as anginal equivalent." patient was to undergo testing this upcoming monday on 2/6; however, as symptoms continued to worsen, he presents to [facility] er for further evaluation.  (04 Feb 2023 04:32)      INTERIM EVENTS:Patient seen at bedside ,interim events noted.Awake alert no chest pain       PMH -reviewed admission note, no change since admission  HEART FAILURE: Acute[ ]Chronic[x ] Systolic[x ] Diastolic[ ] Combined Systolic and Diastolic[ ]  CAD[ ] CABG[ ] PCI[ ]  DEVICES[ ] PPM[ ] ICD[x ] ILR[ ]  ATRIAL FIBRILLATION[ ] Paroxysmal[ ] Permanent[ ] CHADS2-[  ]  RADHA[ ] CKD1[ ] CKD2[ ] CKD3[ ] CKD4[ ] ESRD[ ]  COPD[ ] HTN[ ]   DM[ ] Type1[ ] Type 2[ ]   CVA[ ] Paresis[ ]    AMBULATION: Assisted[ ] Cane/walker[ ] Independent[x ]    MEDICATIONS  (STANDING):  aMIOdarone    Tablet 100 milliGRAM(s) Oral daily  aspirin enteric coated 81 milliGRAM(s) Oral daily  carvedilol 6.25 milliGRAM(s) Oral every 12 hours  clonazePAM  Tablet 1 milliGRAM(s) Oral at bedtime  enoxaparin Injectable 40 milliGRAM(s) SubCutaneous every 24 hours  escitalopram 20 milliGRAM(s) Oral daily  finasteride 5 milliGRAM(s) Oral at bedtime  influenza  Vaccine (HIGH DOSE) 0.7 milliLiter(s) IntraMuscular once  losartan 25 milliGRAM(s) Oral two times a day  mirtazapine 15 milliGRAM(s) Oral at bedtime  simvastatin 20 milliGRAM(s) Oral at bedtime  tamsulosin 0.4 milliGRAM(s) Oral at bedtime    MEDICATIONS  (PRN):  acetaminophen     Tablet .. 650 milliGRAM(s) Oral every 6 hours PRN Temp greater or equal to 38C (100.4F), Mild Pain (1 - 3)  aluminum hydroxide/magnesium hydroxide/simethicone Suspension 30 milliLiter(s) Oral every 4 hours PRN Dyspepsia  melatonin 3 milliGRAM(s) Oral at bedtime PRN Insomnia  ondansetron Injectable 4 milliGRAM(s) IV Push every 8 hours PRN Nausea and/or Vomiting            REVIEW OF SYSTEMS:  Constitutional: [ ] fever, [ ]weight loss,  [ ]fatigue [ ]weight gain  Eyes: [ ] visual changes  Respiratory: [ ]shortness of breath;  [ ] cough, [ ]wheezing, [ ]chills, [ ]hemoptysis  Cardiovascular: [ ] chest pain, [ ]palpitations, [ ]dizziness,  [ ]leg swelling[ ]orthopnea[ ]PND  Gastrointestinal: [ ] abdominal pain, [ ]nausea, [ ]vomiting,  [ ]diarrhea [ ]Constipation [ ]Melena  Genitourinary: [ ] dysuria, [ ] hematuria [ ]Sparks  Neurologic: [ ] headaches [ ] tremors[ ]weakness [ ]Paralysis Right[ ] Left[ ]  Skin: [ ] itching, [ ]burning, [ ] rashes  Endocrine: [ ] heat or cold intolerance  Musculoskeletal: [ ] joint pain or swelling; [ ] muscle, back, or extremity pain  Psychiatric: [ ] depression, [ ]anxiety, [ ]mood swings, or [ ]difficulty sleeping  Hematologic: [ ] easy bruising, [ ] bleeding gums    [ ] All remaining systems negative except as per above.   [ ]Unable to obtain.  [x] No change in ROS since admission      Vital Signs Last 24 Hrs  T(C): 36.6 (09 Feb 2023 08:00), Max: 36.8 (08 Feb 2023 11:30)  T(F): 97.9 (09 Feb 2023 08:00), Max: 98.3 (08 Feb 2023 11:30)  HR: 59 (09 Feb 2023 08:00) (56 - 65)  BP: 122/67 (09 Feb 2023 08:00) (109/62 - 140/66)  RR: 18 (09 Feb 2023 08:00) (18 - 18)  SpO2: 96% (09 Feb 2023 08:00) (96% - 99%)    Parameters below as of 09 Feb 2023 08:00  Patient On (Oxygen Delivery Method): room air      I&O's Summary    08 Feb 2023 07:01  -  09 Feb 2023 07:00  --------------------------------------------------------  IN: 685 mL / OUT: 1000 mL / NET: -315 mL        PHYSICAL EXAM:  General: No acute distress BMI-31  HEENT: EOMI, PERRL  Neck: Supple, [ ] JVD  Lungs: Equal air entry bilaterally; [ ] rales [ ] wheezing [ ] rhonchi  Heart: Regular rate and rhythm; [x ] murmur   2/6 [ x] systolic [ ] diastolic [ ] radiation[ ] rubs [ ]  gallops  Abdomen: Nontender, bowel sounds present  Extremities: No clubbing, cyanosis, [ ] edema [ ]Pulses  equal and intact  Nervous system:  Alert & Oriented X3, no focal deficits  Psychiatric: Normal affect  Skin: No rashes or lesions    LABS:    Creatinine Trend: 0.88<--, 0.87<--, 0.94<--, 0.86<--        CATH 02/07/2023  Diagnostic Conclusions:     1. Severe single vessel CAD involving proximal RCA .   2. Mild disease inLAD, LCx, and Ramus.   3. Plan for staged RCA  PCI in next few weeks.   4. Recommend aggressive risk factor modification and medical therapy for CAD.      ECHO:  02/03/2023  Conclusions:  1. Mild mitral regurgitation.  2. Moderate aortic regurgitation.  3. Dilated Aortic Root: 4.7 cm. Ascending Aorta: 4.2 cm.  4. Severely dilated left atrium.  LA volume index = 55 cc/m2.  5. Mild left ventricular enlargement.  6. Endocardial visualization enhanced with intravenous injection of Ultrasonic Enhancing Agent (Definity).  Moderate segmental left ventricular dysfunction. The mid to basal inferoseptal/mid to basal inferolateral wall are akinetic.  7. Increased E/e'  is consistent with elevated left ventricular filling pressure.  8. A device wire is noted in the right heart. Severe right atrial enlargement.  9. Normal right ventricular size and function.  10. Mild tricuspid regurgitation.  *** No previous Echo exam.      Study Date: 2/6/2019  Mild global left ventricular systolic dysfunction. EF 49%      
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 alleviated by rest, exacerbated by exertion; over the last couple of weeks, episodes of sob have become longer and more frequent. patient denies associated chest pain, palpitations, loc, dizziness/lightheadedness, cough, fever, wheezing, abdominal discomfort, dysuria, generalized fatigue, poor po intake, bleeding. patient grew concerned so visited his cardiologist, who ddocumented the following; "dyspnea...Unclear etiology...no evidence of fluid overload. Chronic amio therapy although he has been on a lower dose. CT chest to eval lung parenchyma (also assess ascending aortic size)...Repeat TTE...Consdier ischemic eval thereafter...?Dyspnea as anginal equivalent."     INTERIM EVENTS:Patient seen at bedside ,interim events noted.Awake lying in bed no chest pain still c/o episodes dyspnea""like waves''      PMH -reviewed admission note, no change since admission  HEART FAILURE: Acute[ ]Chronic[x ] Systolic[ x] Diastolic[ ] Combined Systolic and Diastolic[ ]  CAD[x ] CABG[ ] PCI[x ]  DEVICES[ ] PPM[ ] ICD[x ] ILR[ ]  ATRIAL FIBRILLATION[ ] Paroxysmal[ ] Permanent[ ] CHADS2-[  ]  RADHA[ ] CKD1[ ] CKD2[ ] CKD3[ ] CKD4[ ] ESRD[ ]  COPD[ ] HTN[ ]   DM[ ] Type1[ ] Type 2[ ]   CVA[ ] Paresis[ ]    AMBULATION: Assisted[ ] Cane/walker[ ] Independent[ x]    MEDICATIONS  (STANDING):  aMIOdarone    Tablet 100 milliGRAM(s) Oral daily  aspirin enteric coated 81 milliGRAM(s) Oral daily  carvedilol 6.25 milliGRAM(s) Oral every 12 hours  clonazePAM  Tablet 1 milliGRAM(s) Oral at bedtime  clonazePAM  Tablet 0.5 milliGRAM(s) Oral <User Schedule>  enoxaparin Injectable 40 milliGRAM(s) SubCutaneous every 24 hours  escitalopram 20 milliGRAM(s) Oral daily  finasteride 5 milliGRAM(s) Oral at bedtime  influenza  Vaccine (HIGH DOSE) 0.7 milliLiter(s) IntraMuscular once  losartan 25 milliGRAM(s) Oral two times a day  mirtazapine 15 milliGRAM(s) Oral at bedtime  simvastatin 20 milliGRAM(s) Oral at bedtime  tamsulosin 0.4 milliGRAM(s) Oral at bedtime    MEDICATIONS  (PRN):  acetaminophen     Tablet .. 650 milliGRAM(s) Oral every 6 hours PRN Temp greater or equal to 38C (100.4F), Mild Pain (1 - 3)  aluminum hydroxide/magnesium hydroxide/simethicone Suspension 30 milliLiter(s) Oral every 4 hours PRN Dyspepsia  melatonin 3 milliGRAM(s) Oral at bedtime PRN Insomnia  ondansetron Injectable 4 milliGRAM(s) IV Push every 8 hours PRN Nausea and/or Vomiting            REVIEW OF SYSTEMS:  Constitutional: [ ] fever, [ ]weight loss,  [ ]fatigue [ ]weight gain  Eyes: [ ] visual changes  Respiratory: [ ]shortness of breath;  [ ] cough, [ ]wheezing, [ ]chills, [ ]hemoptysis  Cardiovascular: [ ] chest pain, [ ]palpitations, [ ]dizziness,  [ ]leg swelling[ ]orthopnea[ ]PND  Gastrointestinal: [ ] abdominal pain, [ ]nausea, [ ]vomiting,  [ ]diarrhea [ ]Constipation [ ]Melena  Genitourinary: [ ] dysuria, [ ] hematuria [ ]Sparks  Neurologic: [ ] headaches [ ] tremors[ ]weakness [ ]Paralysis Right[ ] Left[ ]  Skin: [ ] itching, [ ]burning, [ ] rashes  Endocrine: [ ] heat or cold intolerance  Musculoskeletal: [ ] joint pain or swelling; [ ] muscle, back, or extremity pain  Psychiatric: [ ] depression, [ ]anxiety, [ ]mood swings, or [ ]difficulty sleeping  Hematologic: [ ] easy bruising, [ ] bleeding gums    [ ] All remaining systems negative except as per above.   [ ]Unable to obtain.  [x] No change in ROS since admission      Vital Signs Last 24 Hrs  T(C): 36.3 (05 Feb 2023 04:08), Max: 36.9 (04 Feb 2023 17:21)  T(F): 97.3 (05 Feb 2023 04:08), Max: 98.4 (04 Feb 2023 17:21)  HR: 52 (05 Feb 2023 04:08) (52 - 67)  BP: 130/70 (05 Feb 2023 04:08) (130/70 - 151/57)  BP(mean): 84 (04 Feb 2023 17:21) (84 - 84)  RR: 18 (05 Feb 2023 04:08) (17 - 18)  SpO2: 94% (05 Feb 2023 04:08) (94% - 98%)    Parameters below as of 05 Feb 2023 04:08  Patient On (Oxygen Delivery Method): room air      I&O's Summary      PHYSICAL EXAM:  General: No acute distress BMI-28  HEENT: EOMI, PERRL  Neck: Supple, [ ] JVD  Lungs: Equal air entry bilaterally; [ ] rales [ ] wheezing [ ] rhonchi  Heart: Regular rate and rhythm; [x ] murmur   2/6 [ x] systolic [ ] diastolic [ ] radiation[ ] rubs [ ]  gallops  Abdomen: Nontender, bowel sounds present  Extremities: No clubbing, cyanosis, [ ] edema [ ]Pulses  equal and intact  Nervous system:  Alert & Oriented X3, no focal deficits  Psychiatric: Normal affect  Skin: No rashes or lesions    LABS:  02-04    138  |  105  |  16  ----------------------------<  87  4.1   |  24  |  0.94    Ca    8.9      04 Feb 2023 05:57    TPro  6.0  /  Alb  3.5  /  TBili  0.6  /  DBili  x   /  AST  15  /  ALT  9<L>  /  AlkPhos  74  02-04    Creatinine Trend: 0.94<--, 0.86<--                        13.6   8.19  )-----------( 206      ( 04 Feb 2023 05:57 )             40.5     ECHO:  02/03/2023  Conclusions:  1. Mild mitral regurgitation.  2. Moderate aortic regurgitation.  3. Dilated Aortic Root: 4.7 cm. Ascending Aorta: 4.2 cm.  4. Severely dilated left atrium.  LA volume index = 55 cc/m2.  5. Mild left ventricular enlargement.  6. Endocardial visualization enhanced with intravenous injection of Ultrasonic Enhancing Agent (Definity).  Moderate segmental left ventricular dysfunction. The mid to basal inferoseptal/mid to basal inferolateral wall are akinetic.  7. Increased E/e'  is consistent with elevated left ventricular filling pressure.  8. A device wire is noted in the right heart. Severe right atrial enlargement.  9. Normal right ventricular size and function.  10. Mild tricuspid regurgitation.  *** No previous Echo exam.        Study Date: 2/6/2019  Mild global left ventricular systolic dysfunction. EF 49%          
no events overnight.    GENERAL: No fevers, no chills.  EYES: No blurry vision,  No photophobia  ENT: No sore throat.  No dysphagia  Cardiovascular: No chest pain, palpitations, orthopnea  Pulmonary: No cough, no wheezing. No shortness of breath  Gastrointestinal: No abdominal pain, no diarrhea, no constipation.  Musculoskeletal: No weakness.  No myalgias.  Dermatology:  No rashes.  Neuro: No Headache.  No vertigo.  No dizziness.  Psych: No anxiety, no depression.  Denies suicidal thoughts.    MEDICATIONS  (STANDING):  aMIOdarone    Tablet 100 milliGRAM(s) Oral daily  aspirin enteric coated 81 milliGRAM(s) Oral daily  carvedilol 6.25 milliGRAM(s) Oral every 12 hours  clonazePAM  Tablet 1 milliGRAM(s) Oral at bedtime  clonazePAM  Tablet 0.5 milliGRAM(s) Oral <User Schedule>  enoxaparin Injectable 40 milliGRAM(s) SubCutaneous every 24 hours  escitalopram 20 milliGRAM(s) Oral daily  finasteride 5 milliGRAM(s) Oral at bedtime  influenza  Vaccine (HIGH DOSE) 0.7 milliLiter(s) IntraMuscular once  losartan 25 milliGRAM(s) Oral two times a day  mirtazapine 15 milliGRAM(s) Oral at bedtime  simvastatin 20 milliGRAM(s) Oral at bedtime  tamsulosin 0.4 milliGRAM(s) Oral at bedtime    MEDICATIONS  (PRN):  acetaminophen     Tablet .. 650 milliGRAM(s) Oral every 6 hours PRN Temp greater or equal to 38C (100.4F), Mild Pain (1 - 3)  aluminum hydroxide/magnesium hydroxide/simethicone Suspension 30 milliLiter(s) Oral every 4 hours PRN Dyspepsia  melatonin 3 milliGRAM(s) Oral at bedtime PRN Insomnia  ondansetron Injectable 4 milliGRAM(s) IV Push every 8 hours PRN Nausea and/or Vomiting    Vital Signs Last 24 Hrs  T(C): 36.3 (07 Feb 2023 11:29), Max: 36.6 (06 Feb 2023 21:54)  T(F): 97.4 (07 Feb 2023 11:29), Max: 97.8 (06 Feb 2023 21:54)  HR: 60 (07 Feb 2023 11:29) (60 - 69)  BP: 136/93 (07 Feb 2023 11:29) (119/60 - 145/89)  BP(mean): --  RR: 18 (07 Feb 2023 11:29) (18 - 18)  SpO2: 96% (07 Feb 2023 11:29) (96% - 97%)    Parameters below as of 07 Feb 2023 11:29  Patient On (Oxygen Delivery Method): room air    CONSTITUTIONAL: NAD  EYES: PERRLA; conjunctiva and sclera clear  ENMT: Moist oral mucosa, no pharyngeal injection or exudates; normal dentition  NECK: Supple, no palpable masses; no thyromegaly  RESPIRATORY: Normal respiratory effort; lungs are clear to auscultation bilaterally  CARDIOVASCULAR: Regular rate and rhythm, normal S1 and S2, no murmur/rub/gallop; No lower extremity edema; Peripheral pulses are 2+ bilaterally  ABDOMEN: Nontender to palpation, normoactive bowel sounds, no rebound/guarding; No hepatosplenomegaly  MUSCULOSKELETAL:  Normal gait; no clubbing or cyanosis of digits; no joint swelling or tenderness to palpation  PSYCH: A+O to person, place, and time; affect appropriate  SKIN: No rashes; no palpable lesions    .  LABS:                         13.3   7.06  )-----------( 204      ( 07 Feb 2023 05:41 )             38.9     02-07    136  |  105  |  18  ----------------------------<  86  4.3   |  21<L>  |  0.88    Ca    8.8      07 Feb 2023 05:40      PT/INR - ( 06 Feb 2023 06:33 )   PT: 13.4 sec;   INR: 1.16 ratio         PTT - ( 06 Feb 2023 06:33 )  PTT:31.5 sec          RADIOLOGY, EKG & ADDITIONAL TESTS: Reviewed.   
no events overnight.    GENERAL: No fevers, no chills.  EYES: No blurry vision,  No photophobia  ENT: No sore throat.  No dysphagia  Cardiovascular: No chest pain, palpitations, orthopnea  Pulmonary: No cough, no wheezing. No shortness of breath  Gastrointestinal: No abdominal pain, no diarrhea, no constipation.  Musculoskeletal: No weakness.  No myalgias.  Dermatology:  No rashes.  Neuro: No Headache.  No vertigo.  No dizziness.  Psych: No anxiety, no depression.  Denies suicidal thoughts.    MEDICATIONS  (STANDING):  aMIOdarone    Tablet 100 milliGRAM(s) Oral daily  aspirin enteric coated 81 milliGRAM(s) Oral daily  carvedilol 6.25 milliGRAM(s) Oral every 12 hours  clonazePAM  Tablet 1 milliGRAM(s) Oral at bedtime  clonazePAM  Tablet 0.5 milliGRAM(s) Oral <User Schedule>  enoxaparin Injectable 40 milliGRAM(s) SubCutaneous every 24 hours  escitalopram 20 milliGRAM(s) Oral daily  finasteride 5 milliGRAM(s) Oral at bedtime  influenza  Vaccine (HIGH DOSE) 0.7 milliLiter(s) IntraMuscular once  losartan 25 milliGRAM(s) Oral two times a day  mirtazapine 15 milliGRAM(s) Oral at bedtime  simvastatin 20 milliGRAM(s) Oral at bedtime  tamsulosin 0.4 milliGRAM(s) Oral at bedtime    MEDICATIONS  (PRN):  acetaminophen     Tablet .. 650 milliGRAM(s) Oral every 6 hours PRN Temp greater or equal to 38C (100.4F), Mild Pain (1 - 3)  aluminum hydroxide/magnesium hydroxide/simethicone Suspension 30 milliLiter(s) Oral every 4 hours PRN Dyspepsia  melatonin 3 milliGRAM(s) Oral at bedtime PRN Insomnia  ondansetron Injectable 4 milliGRAM(s) IV Push every 8 hours PRN Nausea and/or Vomiting    Vital Signs Last 24 Hrs  T(C): 36.4 (06 Feb 2023 11:10), Max: 36.6 (05 Feb 2023 12:55)  T(F): 97.5 (06 Feb 2023 11:10), Max: 97.8 (05 Feb 2023 12:55)  HR: 61 (06 Feb 2023 11:10) (56 - 63)  BP: 129/75 (06 Feb 2023 11:10) (118/47 - 148/72)  BP(mean): --  RR: 18 (06 Feb 2023 11:10) (18 - 18)  SpO2: 92% (06 Feb 2023 11:10) (92% - 98%)    Parameters below as of 06 Feb 2023 11:10  Patient On (Oxygen Delivery Method): room air    CONSTITUTIONAL: NAD  EYES: PERRLA; conjunctiva and sclera clear  ENMT: Moist oral mucosa, no pharyngeal injection or exudates; normal dentition  NECK: Supple, no palpable masses; no thyromegaly  RESPIRATORY: Normal respiratory effort; lungs are clear to auscultation bilaterally  CARDIOVASCULAR: Regular rate and rhythm, normal S1 and S2, no murmur/rub/gallop; No lower extremity edema; Peripheral pulses are 2+ bilaterally  ABDOMEN: Nontender to palpation, normoactive bowel sounds, no rebound/guarding; No hepatosplenomegaly  MUSCULOSKELETAL:  Normal gait; no clubbing or cyanosis of digits; no joint swelling or tenderness to palpation  PSYCH: A+O to person, place, and time; affect appropriate  NEUROLOGY: CN 2-12 are intact and symmetric; no gross sensory deficits   SKIN: No rashes; no palpable lesions    .  LABS:                         13.4   7.00  )-----------( 203      ( 06 Feb 2023 06:33 )             39.8     02-06    138  |  105  |  17  ----------------------------<  82  4.0   |  24  |  0.87    Ca    8.8      06 Feb 2023 06:33      PT/INR - ( 06 Feb 2023 06:33 )   PT: 13.4 sec;   INR: 1.16 ratio         PTT - ( 06 Feb 2023 06:33 )  PTT:31.5 sec          RADIOLOGY, EKG & ADDITIONAL TESTS: Reviewed.   
[facility] Division of Hospital Medicine  Dwayne Holt MD  Available via MS Teams      SUBJECTIVE / OVERNIGHT EVENTS:  No acute events overnight. Patient reports some dyspnea but no chest pain.    ADDITIONAL REVIEW OF SYSTEMS:  REVIEW OF SYSTEMS:    CONSTITUTIONAL: No weakness, fevers or chills  EYES: no blurry vision or eye pain.   ENT: No throat pain. No dysphagia.    NECK: No pain or stiffness  RESPIRATORY: No cough, wheezing, hemoptysis; No shortness of breath  CARDIOVASCULAR: No chest pain or palpitations.  GASTROINTESTINAL: No abdominal pain. No nausea or vomiting; No diarrhea or constipation. No melena or hematochezia.  GENITOURINARY: No dysuria, frequency or hematuria  NEUROLOGICAL: No numbness or weakness. No dizziness or falls.   SKIN: No itching, burning, rashes, or lesions.   LYMPHATIC: No masses or swelling.   All other review of systems is negative unless indicated above.  MEDICATIONS  (STANDING):  aMIOdarone    Tablet 100 milliGRAM(s) Oral daily  aspirin enteric coated 81 milliGRAM(s) Oral daily  carvedilol 6.25 milliGRAM(s) Oral every 12 hours  clonazePAM  Tablet 1 milliGRAM(s) Oral at bedtime  clonazePAM  Tablet 0.5 milliGRAM(s) Oral <User Schedule>  enoxaparin Injectable 40 milliGRAM(s) SubCutaneous every 24 hours  escitalopram 20 milliGRAM(s) Oral daily  finasteride 5 milliGRAM(s) Oral at bedtime  influenza  Vaccine (HIGH DOSE) 0.7 milliLiter(s) IntraMuscular once  losartan 25 milliGRAM(s) Oral two times a day  mirtazapine 15 milliGRAM(s) Oral at bedtime  simvastatin 20 milliGRAM(s) Oral at bedtime  tamsulosin 0.4 milliGRAM(s) Oral at bedtime    MEDICATIONS  (PRN):  acetaminophen     Tablet .. 650 milliGRAM(s) Oral every 6 hours PRN Temp greater or equal to 38C (100.4F), Mild Pain (1 - 3)  aluminum hydroxide/magnesium hydroxide/simethicone Suspension 30 milliLiter(s) Oral every 4 hours PRN Dyspepsia  melatonin 3 milliGRAM(s) Oral at bedtime PRN Insomnia  ondansetron Injectable 4 milliGRAM(s) IV Push every 8 hours PRN Nausea and/or Vomiting      I&O's Summary      PHYSICAL EXAM:  Vital Signs Last 24 Hrs  T(C): 36.3 (05 Feb 2023 04:08), Max: 36.9 (04 Feb 2023 17:21)  T(F): 97.3 (05 Feb 2023 04:08), Max: 98.4 (04 Feb 2023 17:21)  HR: 52 (05 Feb 2023 04:08) (52 - 67)  BP: 130/70 (05 Feb 2023 04:08) (130/70 - 151/57)  BP(mean): 84 (04 Feb 2023 17:21) (84 - 84)  RR: 18 (05 Feb 2023 04:08) (17 - 18)  SpO2: 94% (05 Feb 2023 04:08) (94% - 98%)    Parameters below as of 05 Feb 2023 04:08  Patient On (Oxygen Delivery Method): room air      CONSTITUTIONAL: NAD, well-developed, well-groomed  EYES: PERRLA; conjunctiva and sclera clear  ENMT: Moist oral mucosa, no pharyngeal injection or exudates; normal dentition  NECK: Supple, no palpable masses; no thyromegaly  RESPIRATORY: Normal respiratory effort; lungs are clear to auscultation bilaterally  CARDIOVASCULAR: Regular rate and rhythm, normal S1 and S2, no murmur/rub/gallop; No lower extremity edema; Peripheral pulses are 2+ bilaterally  ABDOMEN: Nontender to palpation, normoactive bowel sounds, no rebound/guarding; No hepatosplenomegaly  MUSCULOSKELETAL:  Normal gait; no clubbing or cyanosis of digits; no joint swelling or tenderness to palpation  PSYCH: A+O to person, place, and time; affect appropriate  NEUROLOGY: CN 2-12 are intact and symmetric; no gross sensory deficits   SKIN: No rashes; no palpable lesions    LABS:                        13.6   8.19  )-----------( 206      ( 04 Feb 2023 05:57 )             40.5     02-04    138  |  105  |  16  ----------------------------<  87  4.1   |  24  |  0.94    Ca    8.9      04 Feb 2023 05:57    TPro  6.0  /  Alb  3.5  /  TBili  0.6  /  DBili  x   /  AST  15  /  ALT  9<L>  /  AlkPhos  74  02-04                  RADIOLOGY & ADDITIONAL TESTS:  ekg, labs, micro imaging personally reviewed      COORDINATION OF CARE:  care discussed and coordinated with consultants.

## 2023-02-09 NOTE — PROGRESS NOTE ADULT - REASON FOR ADMISSION
sob/arellano

## 2023-02-09 NOTE — PROGRESS NOTE ADULT - PROBLEM SELECTOR PROBLEM 4
Anxiety and depression
HTN (hypertension)
Anxiety and depression

## 2023-02-09 NOTE — DISCHARGE NOTE NURSING/CASE MANAGEMENT/SOCIAL WORK - [id number]_ALL_CORE
For information on Fall & Injury Prevention, visit: [email or web address] OR  [email or web address] OR  https://www.cdc.gov/steadi/patient.html

## 2023-02-09 NOTE — PROGRESS NOTE ADULT - PROBLEM SELECTOR PLAN 3
continue home statin.
h/o high pvc burden  current ekg  shows a paced rhythm with 1st degree AV block, occasional pvcs, and repolarization changes of lvh; prior in 2019 showed similar findings but with bigeminy rather than occasional pvcs  Last ICD interrogation with 80% AP, no events   continue home amiodarone and coreg.

## 2023-02-09 NOTE — PROGRESS NOTE ADULT - PROBLEM SELECTOR PLAN 1
- cardiac cath on 2/6  - tte in 2/2023 shows moderate segmental (akinetic mid to basal inferoseptal and inferolateral walls) LV dysfunction (LVEF ~40%) with elevated lv filling pressures and severely dilated LA + severe RA enlargement  - daily I/Os, daily weights  - salt and fluid restriction    - c/w home asa, statin, bb  - c/w home coreg, cozaar
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
- cardiac cath on 2/7  - tte in 2/2023 shows moderate segmental (akinetic mid to basal inferoseptal and inferolateral walls) LV dysfunction (LVEF ~40%) with elevated lv filling pressures and severely dilated LA + severe RA enlargement  - daily I/Os, daily weights  - salt and fluid restriction    - c/w home asa, statin, bb  - c/w home coreg, cozaar
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 LVEF 55 to 60%. Moderately increased left ventricular internal cavity size. There is moderate eccentric left ventricular hypertrophy. Grade I diastolic dysfunction. Normal right ventricular size and function. Moderately enlarged left atrium.    tte in 2/2023 shows moderate segmental (akinetic mid to basal inferoseptal and inferolateral walls) LV dysfunction (LVEF ~40%) with elevated lv filling pressures and severely dilated LA + severe RA enlargement  Monitor for chest pain, telemetry/EKG changes,  volume status via I/Os, daily weights  salt and fluid restriction    cont home asa, statin, bb  continue home coreg, cozaar  Pending cardiac cath on 2/6

## 2023-02-09 NOTE — PROVIDER CONTACT NOTE (OTHER) - ACTION/TREATMENT ORDERED:
SYED Estrella made aware. awaiting x-ray results. IV ofirmev  ordered and given for the pain. will continue to monitor

## 2023-02-09 NOTE — PROGRESS NOTE ADULT - TIME BILLING
- Review of records, telemetry, vital signs and daily labs.   - General and cardiovascular physical examination.  - Generation of cardiovascular treatment plan.  - Coordination of care.      Patient was seen and examined by me on 02/07/2023,interim events noted,labs and radiology studies reviewed.  Donald Al MD,FACC.  [address] [place], [place] [id number].  [phone]
- Review of records, telemetry, vital signs and daily labs.   - General and cardiovascular physical examination.  - Generation of cardiovascular treatment plan.  - Coordination of care.      Patient was seen and examined by me on 02/09/2023,interim events noted,labs and radiology studies reviewed.  Donald Al MD,FACC.  [address] [place], [place] [id number].  [phone]
- Review of records, telemetry, vital signs and daily labs.   - General and cardiovascular physical examination.  - Generation of cardiovascular treatment plan.  - Coordination of care.      Patient was seen and examined by me on 02/08/2023,interim events noted,labs and radiology studies reviewed.  Donald Al MD,FACC.  [address] [place], [place] [id number].  [phone]
- Review of records, telemetry, vital signs and daily labs.   - General and cardiovascular physical examination.  - Generation of cardiovascular treatment plan.  - Coordination of care.  -Discussed in detail with patients wife      Patient was seen and examined by me on 02/06/2023,interim events noted,labs and radiology studies reviewed.  Donald Al MD,FACC.  [address] [place], [place] [id number].  [phone]
- Review of records, telemetry, vital signs and daily labs.   - General and cardiovascular physical examination.  - Generation of cardiovascular treatment plan.  - Coordination of care.      Patient was seen and examined by me on 02/05/2023,interim events noted,labs and radiology studies reviewed.  Donald Al MD,FACC.  [address] [place], [place] [id number].  [phone]

## 2023-02-09 NOTE — PROGRESS NOTE ADULT - PROVIDER SPECIALTY LIST ADULT
Hospitalist
Cardiology
Hospitalist
Internal Medicine

## 2023-02-09 NOTE — PROGRESS NOTE ADULT - ASSESSMENT
"Physical Therapy Treatment completed.   Bed Mobility:  Supine to Sit: Contact Guard Assist (SBA by end)  Transfers: Sit to Stand: Stand by Assist  Gait: Level Of Assist: Stand by Assist with Front-Wheel Walker       Plan of Care: Will benefit from Physical Therapy 4 times per week  Discharge Recommendations: Equipment: Front-Wheel Walker.    See \"Rehab Therapy-Acute\" Patient Summary Report for complete documentation.     Pt presenting w/ significant improvement from initial eval. Pt was able to perform all mobility w/out assist. He did require sequencing cues for bed mobility w/ pt able to return demonstrate. Pt was able to maintain sternal precautions. Pt able to follow talk test during ambulation w/ HR increases WNL. Pt educated on packet w/ resources in case of questions or emergencies. Pt also educated on outpatient cardiac rehab for most optimal recovery w/ pt receptive. Pt currently is at a level in which he can DC home w/ wife support.  "
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 LVEF 55 to 60%. Moderately increased left ventricular internal cavity size. There is moderate eccentric left ventricular hypertrophy. Grade I diastolic dysfunction. Normal right ventricular size and function. Moderately enlarged left atrium.    TTE on  2/2023 shows moderate segmental (akinetic mid to basal inferoseptal and inferolateral walls) LV dysfunction (LVEF ~40%) with elevated lv filling pressures and severely dilated LA + severe RA enlargementtrop 19->19; bnp ~1300, up from ~700 in 2018  ekg with no new st seg - t wave changes suggestive of acute ischemia  -Possibly anginal equivalent  Will persue ischemic work-up Cardiac cath in AM  Clinically does not seem fluid overloaded      Problem/Plan - 3:  ·  Problem: Thoracic ascending aortic aneurysm.   ·  Plan: Outpatient CT chest 2014: 4.8cm aortic aneurysm  CT Chest 2/3/23: 5 cm mid ascending aortic aneurysm was 4.8 cm in 2019  No acute aortic issue, CT surgery consult noted
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 LVEF 55 to 60%. Moderately increased left ventricular internal cavity size. There is moderate eccentric left ventricular hypertrophy. Grade I diastolic dysfunction. Normal right ventricular size and function. Moderately enlarged left atrium.    TTE on  2/2023 shows moderate segmental (akinetic mid to basal inferoseptal and inferolateral walls) LV dysfunction (LVEF ~40%) with elevated lv filling pressures and severely dilated LA + severe RA enlargementtrop 19->19; bnp ~1300, up from ~700 in 2018  ekg with no new st seg - t wave changes suggestive of acute ischemia  -Possibly anginal equivalent  Will persue ischemic work-up Cardiac cathtoday  Clinically does not seem fluid overloaded    Needs ICD interrogated            Problem/Plan - 2:  ·  Problem: Thoracic ascending aortic aneurysm.   ·  Plan: Outpatient CT chest 2014: 4.8cm aortic aneurysm  CT Chest 2/3/23: 5 cm mid ascending aortic aneurysm was 4.8 cm in 2019  No acute aortic issue, CT surgery consult noted  
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 LVEF 55 to 60%. Moderately increased left ventricular internal cavity size. There is moderate eccentric left ventricular hypertrophy. Grade I diastolic dysfunction. Normal right ventricular size and function. Moderately enlarged left atrium.    TTE on  2/2023 shows moderate segmental (akinetic mid to basal inferoseptal and inferolateral walls) LV dysfunction (LVEF ~40%) with elevated lv filling pressures and severely dilated LA + severe RA enlargementtrop 19->19; bnp ~1300, up from ~700 in 2018  ekg with no new st seg - t wave changes suggestive of acute ischemia  -Possibly anginal equivalent  Ischemic work-up --Cardiac cath reveals  RCA at prior stent consideration for PCI will discuss with Dr Sanchez concerning timing but as  interventions need planned procedure may need to schedule as outpatient        Clinically does not seem fluid overloaded   ICD interrogated normal function no events   HF for enrollment into [facility]            Problem/Plan - 2:  ·  Problem: Thoracic ascending aortic aneurysm.   ·  Plan: Outpatient CT chest 2014: 4.8cm aortic aneurysm  CT Chest 2/3/23: 5 cm mid ascending aortic aneurysm was 4.8 cm in 2019  No acute aortic issue, CT surgery consult noted  
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 LVEF 55 to 60%. Moderately increased left ventricular internal cavity size. There is moderate eccentric left ventricular hypertrophy. Grade I diastolic dysfunction. Normal right ventricular size and function. Moderately enlarged left atrium.    TTE on  2/2023 shows moderate segmental (akinetic mid to basal inferoseptal and inferolateral walls) LV dysfunction (LVEF ~40%) with elevated lv filling pressures and severely dilated LA + severe RA enlargementtrop 19->19; bnp ~1300, up from ~700 in 2018  ekg with no new st seg - t wave changes suggestive of acute ischemia  -Possibly anginal equivalent  Ischemic work-up --Cardiac cath reveals  RCA at prior stent consideration for PCI will discuss with Dr Sanchez concerning timing/      Clinically does not seem fluid overloaded   ICD interrogated normal function no events   HF for enrollment into [facility]            Problem/Plan - 2:  ·  Problem: Thoracic ascending aortic aneurysm.   ·  Plan: Outpatient CT chest 2014: 4.8cm aortic aneurysm  CT Chest 2/3/23: 5 cm mid ascending aortic aneurysm was 4.8 cm in 2019  No acute aortic issue, CT surgery consult noted
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 LVEF 55 to 60%. Moderately increased left ventricular internal cavity size. There is moderate eccentric left ventricular hypertrophy. Grade I diastolic dysfunction. Normal right ventricular size and function. Moderately enlarged left atrium.    TTE on  2/2023 shows moderate segmental (akinetic mid to basal inferoseptal and inferolateral walls) LV dysfunction (LVEF ~40%) with elevated lv filling pressures and severely dilated LA + severe RA enlargementtrop 19->19; bnp ~1300, up from ~700 in 2018  ekg with no new st seg - t wave changes suggestive of acute ischemia  -Possibly anginal equivalent  Will persue ischemic work-up Cardiac cath today  Clinically does not seem fluid overloaded   ICD interrogated normal function no events   HF for enrollment into [facility]            Problem/Plan - 2:  ·  Problem: Thoracic ascending aortic aneurysm.   ·  Plan: Outpatient CT chest 2014: 4.8cm aortic aneurysm  CT Chest 2/3/23: 5 cm mid ascending aortic aneurysm was 4.8 cm in 2019  No acute aortic issue, CT surgery consult noted
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

## 2023-02-09 NOTE — PROGRESS NOTE ADULT - PROBLEM SELECTOR PLAN 5
cont home flomax and proscar.
continue home statin.
cont home flomax and proscar.

## 2023-02-10 ENCOUNTER — APPOINTMENT (OUTPATIENT)
Dept: ELECTROPHYSIOLOGY | Facility: CLINIC | Age: 88
End: 2023-02-10
Payer: MEDICARE

## 2023-02-10 ENCOUNTER — NON-APPOINTMENT (OUTPATIENT)
Age: 88
End: 2023-02-10

## 2023-02-10 PROCEDURE — 93295 DEV INTERROG REMOTE 1/2/MLT: CPT

## 2023-02-10 PROCEDURE — 93296 REM INTERROG EVL PM/IDS: CPT

## 2023-02-13 DIAGNOSIS — Z20.822 ENCOUNTER FOR PREPROCEDURAL LABORATORY EXAMINATION: ICD-10-CM

## 2023-02-13 DIAGNOSIS — Z01.812 ENCOUNTER FOR PREPROCEDURAL LABORATORY EXAMINATION: ICD-10-CM

## 2023-02-13 DIAGNOSIS — Z20.822 CONTACT WITH AND (SUSPECTED) EXPOSURE TO COVID-19: ICD-10-CM

## 2023-02-14 ENCOUNTER — TRANSCRIPTION ENCOUNTER (OUTPATIENT)
Age: 88
End: 2023-02-14

## 2023-02-15 ENCOUNTER — TRANSCRIPTION ENCOUNTER (OUTPATIENT)
Age: 88
End: 2023-02-15

## 2023-02-22 ENCOUNTER — TRANSCRIPTION ENCOUNTER (OUTPATIENT)
Age: 88
End: 2023-02-22

## 2023-02-24 ENCOUNTER — INPATIENT (INPATIENT)
Facility: HOSPITAL | Age: 88
LOS: 0 days | Discharge: HOME | DRG: 251 | End: 2023-02-25
Attending: INTERNAL MEDICINE | Admitting: INTERNAL MEDICINE
Payer: COMMERCIAL

## 2023-02-24 ENCOUNTER — TRANSCRIPTION ENCOUNTER (OUTPATIENT)
Age: 88
End: 2023-02-24

## 2023-02-24 VITALS
TEMPERATURE: 98 F | HEART RATE: 51 BPM | SYSTOLIC BLOOD PRESSURE: 152 MMHG | OXYGEN SATURATION: 96 % | WEIGHT: 149.91 LBS | HEIGHT: 72 IN | RESPIRATION RATE: 20 BRPM | DIASTOLIC BLOOD PRESSURE: 67 MMHG

## 2023-02-24 DIAGNOSIS — I25.10 ATHEROSCLEROTIC HEART DISEASE OF NATIVE CORONARY ARTERY WITHOUT ANGINA PECTORIS: ICD-10-CM

## 2023-02-24 DIAGNOSIS — Z95.810 PRESENCE OF AUTOMATIC (IMPLANTABLE) CARDIAC DEFIBRILLATOR: Chronic | ICD-10-CM

## 2023-02-24 DIAGNOSIS — Z98.890 OTHER SPECIFIED POSTPROCEDURAL STATES: Chronic | ICD-10-CM

## 2023-02-24 DIAGNOSIS — Z95.5 PRESENCE OF CORONARY ANGIOPLASTY IMPLANT AND GRAFT: Chronic | ICD-10-CM

## 2023-02-24 DIAGNOSIS — Z96.659 PRESENCE OF UNSPECIFIED ARTIFICIAL KNEE JOINT: Chronic | ICD-10-CM

## 2023-02-24 LAB
ANION GAP SERPL CALC-SCNC: 10 MMOL/L — SIGNIFICANT CHANGE UP (ref 5–17)
BLD GP AB SCN SERPL QL: NEGATIVE — SIGNIFICANT CHANGE UP
BUN SERPL-MCNC: 19 MG/DL — SIGNIFICANT CHANGE UP (ref 7–23)
CALCIUM SERPL-MCNC: 9.3 MG/DL — SIGNIFICANT CHANGE UP (ref 8.4–10.5)
CHLORIDE SERPL-SCNC: 107 MMOL/L — SIGNIFICANT CHANGE UP (ref 96–108)
CO2 SERPL-SCNC: 23 MMOL/L — SIGNIFICANT CHANGE UP (ref 22–31)
CREAT SERPL-MCNC: 0.82 MG/DL — SIGNIFICANT CHANGE UP (ref 0.5–1.3)
EGFR: 84 ML/MIN/1.73M2 — SIGNIFICANT CHANGE UP
GLUCOSE SERPL-MCNC: 92 MG/DL — SIGNIFICANT CHANGE UP (ref 70–99)
HCT VFR BLD CALC: 40.6 % — SIGNIFICANT CHANGE UP (ref 39–50)
HGB BLD-MCNC: 13.4 G/DL — SIGNIFICANT CHANGE UP (ref 13–17)
MCHC RBC-ENTMCNC: 32.1 PG — SIGNIFICANT CHANGE UP (ref 27–34)
MCHC RBC-ENTMCNC: 33 GM/DL — SIGNIFICANT CHANGE UP (ref 32–36)
MCV RBC AUTO: 97.1 FL — SIGNIFICANT CHANGE UP (ref 80–100)
NRBC # BLD: 0 /100 WBCS — SIGNIFICANT CHANGE UP (ref 0–0)
PLATELET # BLD AUTO: 206 K/UL — SIGNIFICANT CHANGE UP (ref 150–400)
POTASSIUM SERPL-MCNC: 4.1 MMOL/L — SIGNIFICANT CHANGE UP (ref 3.5–5.3)
POTASSIUM SERPL-SCNC: 4.1 MMOL/L — SIGNIFICANT CHANGE UP (ref 3.5–5.3)
RBC # BLD: 4.18 M/UL — LOW (ref 4.2–5.8)
RBC # FLD: 13.9 % — SIGNIFICANT CHANGE UP (ref 10.3–14.5)
RH IG SCN BLD-IMP: POSITIVE — SIGNIFICANT CHANGE UP
SODIUM SERPL-SCNC: 140 MMOL/L — SIGNIFICANT CHANGE UP (ref 135–145)
WBC # BLD: 6.49 K/UL — SIGNIFICANT CHANGE UP (ref 3.8–10.5)
WBC # FLD AUTO: 6.49 K/UL — SIGNIFICANT CHANGE UP (ref 3.8–10.5)

## 2023-02-24 PROCEDURE — 92920 PRQ TRLUML C ANGIOP 1ART&/BR: CPT | Mod: RC,52

## 2023-02-24 PROCEDURE — 99152 MOD SED SAME PHYS/QHP 5/>YRS: CPT

## 2023-02-24 PROCEDURE — 93010 ELECTROCARDIOGRAM REPORT: CPT

## 2023-02-24 RX ORDER — SIMVASTATIN 20 MG/1
20 TABLET, FILM COATED ORAL AT BEDTIME
Refills: 0 | Status: DISCONTINUED | OUTPATIENT
Start: 2023-02-24 | End: 2023-02-25

## 2023-02-24 RX ORDER — ASPIRIN/CALCIUM CARB/MAGNESIUM 324 MG
81 TABLET ORAL DAILY
Refills: 0 | Status: DISCONTINUED | OUTPATIENT
Start: 2023-02-24 | End: 2023-02-25

## 2023-02-24 RX ORDER — FINASTERIDE 5 MG/1
5 TABLET, FILM COATED ORAL DAILY
Refills: 0 | Status: DISCONTINUED | OUTPATIENT
Start: 2023-02-24 | End: 2023-02-25

## 2023-02-24 RX ORDER — CLONAZEPAM 1 MG
0.5 TABLET ORAL DAILY
Refills: 0 | Status: DISCONTINUED | OUTPATIENT
Start: 2023-02-24 | End: 2023-02-25

## 2023-02-24 RX ORDER — ACETAMINOPHEN 500 MG
650 TABLET ORAL EVERY 6 HOURS
Refills: 0 | Status: DISCONTINUED | OUTPATIENT
Start: 2023-02-24 | End: 2023-02-25

## 2023-02-24 RX ORDER — ESCITALOPRAM OXALATE 10 MG/1
20 TABLET, FILM COATED ORAL DAILY
Refills: 0 | Status: DISCONTINUED | OUTPATIENT
Start: 2023-02-24 | End: 2023-02-25

## 2023-02-24 RX ORDER — SODIUM CHLORIDE 9 MG/ML
250 INJECTION INTRAMUSCULAR; INTRAVENOUS; SUBCUTANEOUS ONCE
Refills: 0 | Status: DISCONTINUED | OUTPATIENT
Start: 2023-02-24 | End: 2023-02-25

## 2023-02-24 RX ORDER — SODIUM CHLORIDE 9 MG/ML
1000 INJECTION INTRAMUSCULAR; INTRAVENOUS; SUBCUTANEOUS
Refills: 0 | Status: DISCONTINUED | OUTPATIENT
Start: 2023-02-24 | End: 2023-02-25

## 2023-02-24 RX ORDER — CARVEDILOL PHOSPHATE 80 MG/1
6.25 CAPSULE, EXTENDED RELEASE ORAL EVERY 12 HOURS
Refills: 0 | Status: DISCONTINUED | OUTPATIENT
Start: 2023-02-24 | End: 2023-02-25

## 2023-02-24 RX ORDER — ERYTHROMYCIN BASE 5 MG/GRAM
1 OINTMENT (GRAM) OPHTHALMIC (EYE)
Refills: 0 | Status: DISCONTINUED | OUTPATIENT
Start: 2023-02-24 | End: 2023-02-25

## 2023-02-24 RX ORDER — AMIODARONE HYDROCHLORIDE 400 MG/1
100 TABLET ORAL DAILY
Refills: 0 | Status: DISCONTINUED | OUTPATIENT
Start: 2023-02-24 | End: 2023-02-25

## 2023-02-24 RX ORDER — LOSARTAN POTASSIUM 100 MG/1
25 TABLET, FILM COATED ORAL EVERY 12 HOURS
Refills: 0 | Status: DISCONTINUED | OUTPATIENT
Start: 2023-02-24 | End: 2023-02-25

## 2023-02-24 RX ORDER — INFLUENZA VIRUS VACCINE 15; 15; 15; 15 UG/.5ML; UG/.5ML; UG/.5ML; UG/.5ML
0.7 SUSPENSION INTRAMUSCULAR ONCE
Refills: 0 | Status: DISCONTINUED | OUTPATIENT
Start: 2023-02-24 | End: 2023-02-25

## 2023-02-24 RX ORDER — TAMSULOSIN HYDROCHLORIDE 0.4 MG/1
0.4 CAPSULE ORAL AT BEDTIME
Refills: 0 | Status: DISCONTINUED | OUTPATIENT
Start: 2023-02-24 | End: 2023-02-25

## 2023-02-24 RX ORDER — AMIODARONE HYDROCHLORIDE 400 MG/1
0 TABLET ORAL
Qty: 0 | Refills: 0 | DISCHARGE

## 2023-02-24 RX ORDER — CLONAZEPAM 1 MG
1 TABLET ORAL AT BEDTIME
Refills: 0 | Status: DISCONTINUED | OUTPATIENT
Start: 2023-02-24 | End: 2023-02-25

## 2023-02-24 RX ORDER — MIRTAZAPINE 45 MG/1
15 TABLET, ORALLY DISINTEGRATING ORAL AT BEDTIME
Refills: 0 | Status: DISCONTINUED | OUTPATIENT
Start: 2023-02-24 | End: 2023-02-25

## 2023-02-24 RX ADMIN — LOSARTAN POTASSIUM 25 MILLIGRAM(S): 100 TABLET, FILM COATED ORAL at 17:07

## 2023-02-24 RX ADMIN — SIMVASTATIN 20 MILLIGRAM(S): 20 TABLET, FILM COATED ORAL at 21:04

## 2023-02-24 RX ADMIN — TAMSULOSIN HYDROCHLORIDE 0.4 MILLIGRAM(S): 0.4 CAPSULE ORAL at 21:04

## 2023-02-24 RX ADMIN — Medication 1 APPLICATION(S): at 17:07

## 2023-02-24 RX ADMIN — FINASTERIDE 5 MILLIGRAM(S): 5 TABLET, FILM COATED ORAL at 21:04

## 2023-02-24 RX ADMIN — MIRTAZAPINE 15 MILLIGRAM(S): 45 TABLET, ORALLY DISINTEGRATING ORAL at 21:04

## 2023-02-24 RX ADMIN — CARVEDILOL PHOSPHATE 6.25 MILLIGRAM(S): 80 CAPSULE, EXTENDED RELEASE ORAL at 17:07

## 2023-02-24 RX ADMIN — Medication 1 MILLIGRAM(S): at 21:07

## 2023-02-24 NOTE — DISCHARGE NOTE PROVIDER - HOSPITAL COURSE
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  Last ICD interrogation 11/22 without events or significant ectopy  Maintained on amio 100mg x 4 years  Known dilated aorta, 5.0 cm on TTE, (4.8cm in 2014), TTE: 55-60%, mod LVH, mild DD  No ICD shocks   Recent ICD check showing 1 VT episode in Feb 2021 terminated via 1 ATP, 22 beats of VT in May 2022 self terminated.  Recent cath on 2/4/23 reveals  1. Severe single vessel CAD involving proximal RCA .   2. Mild disease in LAD, LCx, and Ramus.   3. Plan for staged RCA  PCI in next few weeks.   4. Recommend aggressive risk factor modification and medical therapy  for CAD.  Seen & evaluated by cardiologist Dr Long Villa & now recommends for C for .     2/24 s/p failed PCI of RCA  via RFA w/ perclose, site without hematoma/bleeding. 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  Last ICD interrogation 11/22 without events or significant ectopy  Maintained on amio 100mg x 4 years  Known dilated aorta, 5.0 cm on TTE, (4.8cm in 2014), TTE: 55-60%, mod LVH, mild DD  No ICD shocks   Recent ICD check showing 1 VT episode in Feb 2021 terminated via 1 ATP, 22 beats of VT in May 2022 self terminated.  Recent cath on 2/4/23 reveals  1. Severe single vessel CAD involving proximal RCA .   2. Mild disease in LAD, LCx, and Ramus.   3. Plan for staged RCA  PCI in next few weeks.   4. Recommend aggressive risk factor modification and medical therapy  for CAD.  Seen & evaluated by cardiologist Dr Long Villa & now recommends for LHC for .     2/24 s/p failed PCI of RCA  via RFA w/ perclose, site without hematoma/bleeding. Patient without complaint, hemodynamically stable overnight. Pending discharge in AM

## 2023-02-24 NOTE — DISCHARGE NOTE PROVIDER - CARE PROVIDER_API CALL
Long Villa)  Internal Medicine  [address] [place] [address]  Phone: [phone]  Fax: [phone]  Follow Up Time: 2 weeks

## 2023-02-24 NOTE — DISCHARGE NOTE PROVIDER - NSDCFUSCHEDAPPT_GEN_ALL_CORE_FT
Long Villa  [facility]  CARDIOLOGY [address]   Scheduled Appointment: 03/13/2023    [facility] [phone]  Scheduled Appointment: 05/12/2023

## 2023-02-24 NOTE — CONSULT NOTE ADULT - ASSESSMENT
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  Last ICD interrogation 11/22 without events or significant ectopy  Maintained on amio 100mg x 4 years  Known dilated aorta, 5.0 cm on TTE, (4.8cm in 2014),   TTE: 55-60%, mod LVH, mild DD  No ICD shocks   Recent ICD check showing 1 VT episode in Feb 2021 terminated via 1 ATP, 22 beats of VT in May 2022 self terminated.  Recent cath on 2/4/23 aspirin   1. Severe single vessel CAD involving proximal RCA .   2. Mild disease in LAD, LCx, and Ramus.   3. Plan for staged RCA  PCI in next few weeks.   4. Recommend aggressive risk factor modification and medical therapy for CAD.    #CAD   RCA  Attempted PCI   Unable to cross lesion  Continue aspirin'Observe overnight

## 2023-02-24 NOTE — DISCHARGE NOTE PROVIDER - NSDCCPCAREPLAN_GEN_ALL_CORE_FT
PRINCIPAL DISCHARGE DIAGNOSIS  Diagnosis: CAD (coronary artery disease)  Assessment and Plan of Treatment: Low salt, low fat diet.   Weight management.   Take medications as prescribed.    No smoking.  Follow up appointments with your doctor(s)  as instructed.        SECONDARY DISCHARGE DIAGNOSES  Diagnosis: HLD (hyperlipidemia)  Assessment and Plan of Treatment: Your LDL cholesterol will be less than 70mg/dL  Continue with your cholesterol medications. Eat a heart healthy diet that is low in saturated fats and salt, and includes whole grains, fruits, vegetables and lean protein; exercise regularly (consult with your physician or cardiologist first); maintain a heart healthy weight; if you smoke - quit (A resource to help you stop smoking is the [facility] – phone number [phone].). Continue to follow with your primary physician or cardiologist.

## 2023-02-24 NOTE — CONSULT NOTE ADULT - SUBJECTIVE AND OBJECTIVE BOX
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  Last ICD interrogation 11/22 without events or significant ectopy  Maintained on amio 100mg x 4 years  Known dilated aorta, 5.0 cm on TTE, (4.8cm in 2014), TTE: 55-60%, mod LVH, mild DD  No ICD shocks   Recent ICD check showing 1 VT episode in Feb 2021 terminated via 1 ATP, 22 beats of VT in May 2022 self terminated.  Recent cath on 2/4/23 reveals  1. Severe single vessel CAD involving proximal RCA .   2. Mild disease in LAD, LCx, and Ramus.   3. Plan for staged RCA  PCI in next few weeks.   4. Recommend aggressive risk factor modification and medical therapy for CAD.  Seen & evaluated by cardiologist Dr carisa Villa & now recommends for [facility] for .   Awake alert is ambulating underwent attempt to PCI -RCA unable to cross lesion,no chest psim      PAST MEDICAL & SURGICAL HISTORY:  Coronary artery disease  s/p stents      Arrhythmia  s/p AICD/PPM      Hyperlipidemia      Hypertension      Benign prostatic hyperplasia      S/P hernia surgery  Emergent incarcerated Inguinal hernia  sx complicated by hematoma and anemia in Jan 2019      S/P implantation of automatic cardioverter/defibrillator (AICD)  AND PPM      History of knee replacement      S/P coronary artery stent placement          MEDICATIONS  (STANDING):  aMIOdarone    Tablet 100 milliGRAM(s) Oral daily  aspirin enteric coated 81 milliGRAM(s) Oral daily  carvedilol 6.25 milliGRAM(s) Oral every 12 hours  clonazePAM  Tablet 0.5 milliGRAM(s) Oral daily  clonazePAM  Tablet 1 milliGRAM(s) Oral at bedtime  erythromycin   Ointment 1 Application(s) Both EYES two times a day  escitalopram 20 milliGRAM(s) Oral daily  finasteride 5 milliGRAM(s) Oral daily  influenza  Vaccine (HIGH DOSE) 0.7 milliLiter(s) IntraMuscular once  losartan 25 milliGRAM(s) Oral every 12 hours  mirtazapine 15 milliGRAM(s) Oral at bedtime  simvastatin 20 milliGRAM(s) Oral at bedtime  sodium chloride 0.9% Bolus 250 milliLiter(s) IV Bolus once  sodium chloride 0.9%. 1000 milliLiter(s) (75 mL/Hr) IV Continuous <Continuous>  tamsulosin 0.4 milliGRAM(s) Oral at bedtime    MEDICATIONS  (PRN):  acetaminophen     Tablet .. 650 milliGRAM(s) Oral every 6 hours PRN Mild Pain (1 - 3), Moderate Pain (4 - 6)      FAMILY HISTORY:    No family history of premature coronary artery disease or sudden cardiac death    SOCIAL HISTORY:  Smoking-[ ] Active  [x ] Former [ ] Non Smoker  Alcohol-[x ] Denies [ ] Social [ ] Daily  Ilicit Drug use-[ x] Denies [ ] Active user    REVIEW OF SYSTEMS:  Constitutional: [ ] fever, [ ]weight loss, [ ]fatigue   Activity [ ] Bedbound,[x ] Ambulates [x ] Unassisted[ ] Cane/Walker [ ] Assistence.  Effort tolerance:[ ] Excellent [ ] Good [ ] Fair [x ] Poor [ ]  Eyes: [ ] visual changes  Respiratory: [x ]shortness of breath;  [ ] cough, [ ]wheezing, [ ]chills, [ ]hemoptysis  Cardiovascular: [ ] chest pain, [ ]palpitations, [ ]dizziness,  [ ]leg swelling[ ]orthopnea [ ]PND  Gastrointestinal: [ ] abdominal pain, [ ]nausea, [ ]vomiting,  [ ]diarrhea,[ ]constipation  Genitourinary: [ ] dysuria, [ ] hematuria  Neurologic: [ ] headaches [ ] tremors[ ] weakness  Skin: [ ] itching, [ ]burning, [ ] rashes  Endocrine: [ ] heat or cold intolerance  Musculoskeletal: [ ] joint pain or swelling; [ ] muscle, back, or extremity pain  Psychiatric: [ ] depression, [ ]anxiety, [ ]mood swings, or [ ]difficulty sleeping  Hematologic: [ ] easy bruising, [ ] bleeding gums       [ x] All others negative	  [ ] Unable to obtain    Vital Signs Last 24 Hrs  T(C): 36.6 (24 Feb 2023 19:31), Max: 36.6 (24 Feb 2023 06:31)  T(F): 97.9 (24 Feb 2023 19:31), Max: 97.9 (24 Feb 2023 19:31)  HR: 62 (24 Feb 2023 19:31) (51 - 70)  BP: 126/68 (24 Feb 2023 19:31) (119/54 - 156/76)  BP(mean): 82 (24 Feb 2023 19:31) (77 - 104)  RR: 18 (24 Feb 2023 19:31) (14 - 20)  SpO2: 94% (24 Feb 2023 19:31) (94% - 98%)    Parameters below as of 24 Feb 2023 19:31  Patient On (Oxygen Delivery Method): room air      I&O's Summary    24 Feb 2023 07:01  -  24 Feb 2023 19:35  --------------------------------------------------------  IN: 0 mL / OUT: 550 mL / NET: -550 mL        PHYSICAL EXAM:  General: No acute distress BMI-28  HEENT: EOMI, PERRL[ ] Icteric  Neck: Supple, No JVD  Lungs: Equal air entry bilaterally; [ ] Rales [ ] Rhonchi [ ] Wheezing  Heart: Regular rate and rhythm;[ x] Murmurs-  2 /6 [x ] Systolic [ ] Diastolic [ ] Radiation,No rubs, or gallops  Abdomen: Nontender, bowel sounds present  Extremities: No clubbing, cyanosis, or edema[ ] Calf tenderness  Nervous system:  Alert & Oriented X3, no focal deficits  Psychiatric: Normal affect  Skin: No rashes or lesions      LABS:  02-24    140  |  107  |  19  ----------------------------<  92  4.1   |  23  |  0.82    Ca    9.3      24 Feb 2023 07:13      Creatinine Trend: 0.82<--, 0.88<--, 0.87<--, 0.94<--, 0.86<--                        13.4   6.49  )-----------( 206      ( 24 Feb 2023 07:13 )             40.6       RADIOLOGY:    ECG [my interpretation]:  Atrial-paced rhythm with prolonged AV conduction WITH OCCASIONAL supraventricular complexes AND WITH FREQUENT PREMATURE VENTRICULAR COMPLEXES  INCOMPLETE LEFT BUNDLE BRANCH BLOCK  MODERATE VOLTAGE CRITERIA FOR LVH, MAY BE NORMAL VARIANT ( Sokolow-Patel , Yandel product )  NONSPECIFIC ST AND T WAVE ABNORMALITY  PROLONGED QT

## 2023-02-24 NOTE — ASU PATIENT PROFILE, ADULT - FALL HARM RISK - HARM RISK INTERVENTIONS

## 2023-02-24 NOTE — H&P CARDIOLOGY - HISTORY OF PRESENT ILLNESS
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  Last ICD interrogation 11/22 without events or significant ectopy  Maintained on amio 100mg x 4 years  Known dilated aorta, 5.0 cm on TTE, (4.8cm in 2014), TTE: 55-60%, mod LVH, mild DD  No ICD shocks   Recent ICD check showing 1 VT episode in Feb 2021 terminated via 1 ATP, 22 beats of VT in May 2022 self terminated.  Recent cath on 2/4/23 reveals  1. Severe single vessel CAD involving proximal RCA .   2. Mild disease in LAD, LCx, and Ramus.   3. Plan for staged RCA  PCI in next few weeks.   4. Recommend aggressive risk factor modification and medical therapy  for CAD.  Seen & evaluated by cardiologist Dr carisa Villa & now recommends for [facility] for .

## 2023-02-24 NOTE — DISCHARGE NOTE PROVIDER - NSDCCPTREATMENT_GEN_ALL_CORE_FT
PRINCIPAL PROCEDURE  Procedure: Left heart cardiac cath  Findings and Treatment: 2/24 s/p failed PCI of RCA  via RFA w/ perclose- will return in a few weeks to reattempt       PRINCIPAL PROCEDURE  Procedure: Left heart cardiac cath  Findings and Treatment: Study Date:     2023   Name:           JORDON OROZCO   :            1934   (88 years)   Cath Lab Report    Interventional Cardiologist:   Josh Snachez MD   Fellow:                        Lamar Cross, Fellow   Referring Physician:           Long Villa MD   Procedures Performed   Procedures:               1.    Arterial Access - Right Femoral   2.    Ultrasound Guided Access   3.    PCI:    4.    PCI: POBA   5.    Perclose   Indications:               Staged Procedures   Angina   Lab Visit Indication:      new onset angina (less than or equal to 2  months)  PCI Status:                elective   Conclusions:   1. Unsuccessful RCA  PCI due to unsuccessful wire crossing.   2. Will plan for re-attempt  PCI in next few weeks.

## 2023-02-24 NOTE — PATIENT PROFILE ADULT - FALL HARM RISK - HARM RISK INTERVENTIONS

## 2023-02-24 NOTE — DISCHARGE NOTE PROVIDER - NSDCFUADDINST_GEN_ALL_CORE_FT
Wound Care: The day AFTER your procedure:  Remove bandage GENTLY, and clean using mild soap and gentle warm, water stream, pat dry.   Leave OPEN to air. YOU MAY SHOWER  DO NOT SOAK your procedure site for 1 week (no baths, no pools, no tubs)  Check your wrist or groin puncture site everyday.  A small amount of soreness and bruising is normal  ACTIVITY for the next 24 hours:  1) DO NOT DRIVE  2) DO NOT make any important decisions or sign legal documents  3) DO NOT operate heavy [place] the [place]   You may resume sexual activity in 48 hours, unless otherwise instructed by your cardiologist  If your procedure was done through the WRIST, for the NEXT 3 DAYS:  AVOID pushing pulling  or repeated movement of that hand and wrist (eg: typing, hammering)  DO NOT LIFT anything more than 5 lbs     If your procedure was done through the GROIN, for the NEXT 5 DAYS:  Limit climbing the stairs, no strenuous activities, no pushing, no pulling, no straining  Do not lift anything that is 10lbs or heavier   MEDICATION:  Take your medications as explained (see discharge paperwork). If you recieved a stent, you will be taking medication to KEEP YOUR STENT OPEN. DO NOT STOP THESE MEDICATIONS UNLESS DIRECTED BY A CARDIOLOGIST  If you smoke, WE RECOMMEND YOU QUIT (you may call [phone] the [facility] of [employer] [facility] if you need assistance)   FOLLOW UP: Please follow up with your private cardiologist (insert name) in 2 weeks.  Please call immediately for an appointment upon discharge from the hospital.    ***CALL YOUR DOCTOR***   If you experience: chest pain, fever, chills, body aches, or severe pain, swelling, redness, heat or yellow discharge at incision site or if you experience bleeding, temperature change, numbness or excruciating pain at the procedural site  If you are unable to reach your doctor, you may contact:    Cardiology Office at [facility] at [phone]   Cardiac Short Stay Unit (CSSU) [phone]    Cardiac Recovery Suite (CRS) [phone]

## 2023-02-24 NOTE — CONSULT NOTE ADULT - TIME BILLING
- Review of records, telemetry, vital signs and daily labs.   - General and cardiovascular physical examination.  - Generation of cardiovascular treatment plan.  - Coordination of care.      Patient was seen and examined by me on 02/24/2023,interim events noted,labs and radiology studies reviewed.  Donald Al MD,FACC.  [address] [place], [place] [id number].  [phone]

## 2023-02-24 NOTE — PATIENT PROFILE ADULT - SAFE PLACE TO LIVE
Plan:  Appt scheduled per PSR for today at 1000 with Dr. Bravo.    Onset: 3/17 at 1300   Location / description: \"Margaret c/o burning when she urinates.  She had an accident x 1.  Sometimes when she urinates only a little bit comes out.\"  Precipitating Factors: none  Pain Scale (1 - 10), 10 highest: unrated  Associated Symptoms: see above  What  improves / worsens symptoms: n/a  Symptom specific medications: n/a  Recent Care: 3/14 OV Dr. Bravo    Reason for Disposition  • [1] Day or night wetting AND [2] recent onset    Protocols used: URINATION PAIN - FEMALE-P-AH    Maddie Canas RN     no

## 2023-02-24 NOTE — DISCHARGE NOTE PROVIDER - NSDCMRMEDTOKEN_GEN_ALL_CORE_FT
acetaminophen 325 mg oral tablet: 2 tab(s) orally every 6 hours, As needed, Temp greater or equal to 38C (100.4F), Mild Pain (1 - 3)  amiodarone 100 mg oral tablet: 1  orally once a day in AM   Coreg 6.25 mg oral tablet: 1 tab(s) orally 2 times a day   Cozaar 25 mg oral tablet: 1 tab(s) orally 2 times a day  Ecotrin Adult Low Strength 81 mg oral delayed release tablet: 1 tab(s) orally once a day  erythromycin 0.5% ophthalmic ointment: 1 application to each affected eye 2 times a day  finasteride 5 mg oral tablet: 1 tab(s) orally once a day  Flomax 0.4 mg oral capsule: 1 cap(s) orally once a day  KlonoPIN 0.5 mg oral tablet: 1 tab(s) orally once a day in the morning  KlonoPIN 1 mg oral tablet: 1 tab(s) orally once a day (at bedtime)  Lexapro 20 mg oral tablet: 1 tab(s) orally once a day  Remeron 15 mg oral tablet: 1 tab(s) orally once a day (at bedtime)  William Walker :   Zocor 20 mg oral tablet: 1 tab(s) orally once a day (at bedtime)

## 2023-02-25 ENCOUNTER — TRANSCRIPTION ENCOUNTER (OUTPATIENT)
Age: 88
End: 2023-02-25

## 2023-02-25 VITALS
RESPIRATION RATE: 17 BRPM | OXYGEN SATURATION: 95 % | SYSTOLIC BLOOD PRESSURE: 117 MMHG | HEART RATE: 62 BPM | DIASTOLIC BLOOD PRESSURE: 70 MMHG | TEMPERATURE: 98 F

## 2023-02-25 PROCEDURE — 92943 PRQ TRLUML REVSC CH OCC ANT: CPT

## 2023-02-25 PROCEDURE — 86850 RBC ANTIBODY SCREEN: CPT

## 2023-02-25 PROCEDURE — 93005 ELECTROCARDIOGRAM TRACING: CPT

## 2023-02-25 PROCEDURE — 99231 SBSQ HOSP IP/OBS SF/LOW 25: CPT | Mod: FS

## 2023-02-25 PROCEDURE — 92920 PRQ TRLUML C ANGIOP 1ART&/BR: CPT | Mod: RC,74

## 2023-02-25 PROCEDURE — C1725: CPT

## 2023-02-25 PROCEDURE — 86901 BLOOD TYPING SEROLOGIC RH(D): CPT

## 2023-02-25 PROCEDURE — C1760: CPT

## 2023-02-25 PROCEDURE — 86900 BLOOD TYPING SEROLOGIC ABO: CPT

## 2023-02-25 PROCEDURE — C1894: CPT

## 2023-02-25 PROCEDURE — C1887: CPT

## 2023-02-25 PROCEDURE — 36415 COLL VENOUS BLD VENIPUNCTURE: CPT

## 2023-02-25 PROCEDURE — 85027 COMPLETE CBC AUTOMATED: CPT

## 2023-02-25 PROCEDURE — 80048 BASIC METABOLIC PNL TOTAL CA: CPT

## 2023-02-25 PROCEDURE — C1769: CPT

## 2023-02-25 RX ADMIN — Medication 81 MILLIGRAM(S): at 05:27

## 2023-02-25 RX ADMIN — AMIODARONE HYDROCHLORIDE 100 MILLIGRAM(S): 400 TABLET ORAL at 05:27

## 2023-02-25 RX ADMIN — Medication 1 APPLICATION(S): at 05:28

## 2023-02-25 RX ADMIN — LOSARTAN POTASSIUM 25 MILLIGRAM(S): 100 TABLET, FILM COATED ORAL at 05:27

## 2023-02-25 RX ADMIN — CARVEDILOL PHOSPHATE 6.25 MILLIGRAM(S): 80 CAPSULE, EXTENDED RELEASE ORAL at 05:27

## 2023-02-25 NOTE — DISCHARGE NOTE NURSING/CASE MANAGEMENT/SOCIAL WORK - PATIENT PORTAL LINK FT
You can access the FollowMyHealth Patient Portal offered by [facility] by registering at the following website: [email or web address]. By joining [employer]’s FollowMyHealth portal, you will also be able to view your health information using other applications (apps) compatible with our system.

## 2023-02-25 NOTE — PROGRESS NOTE ADULT - ASSESSMENT
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  Last ICD interrogation 11/22 without events or significant ectopy  Maintained on amio 100mg x 4 years  Known dilated aorta, 5.0 cm on TTE, (4.8cm in 2014), TTE: 55-60%, mod LVH, mild DD  No ICD shocks   Recent ICD check showing 1 VT episode in Feb 2021 terminated via 1 ATP, 22 beats of VT in May 2022 self terminated.  Recent cath on 2/4/23 reveals  1. Severe single vessel CAD involving proximal RCA .   2. Mild disease in LAD, LCx, and Ramus.   3. Plan for staged RCA  PCI in next few weeks.   4. Recommend aggressive risk factor modification and medical therapy  for CAD.  Seen & evaluated by cardiologist Dr carisa Villa & now recommends for [facility] for .     # CAD  2/24 s/p failed PCI or RCA  via RFA perclose  Right groin w/o bleeding or hematoma. soft, non tender.  Pulses in the right lower extremity are palpable.   Denies chest pain, denies groin/leg/foot: pain, numbness or tingling   cont aspirin  cont antihypertensive- losartan, carvedilol  cont statin- simvastatin  monitor telemetry  Keep Mg >2 K >4  f/u appt in 2 weeks post dc with oupt cardiologist    - Reviewed and reinforced with patient:  wound care instructions, activities dos and donts, medication compliance specifically antiplatelet therapy given stent/s.    - Patient aware to take DAPT  as prescribed and DO NOT STOP taking without consulting cardiologist first or STENT/s WILL CLOSE  - Reviewed and reinforced with patient:  site complications ( eg: bleeding, excruciating pain at the procedural site, large sweliing-golf ball size-  extremity numbness, tingling, temperature change), or CHEST PAIN; pt aware that if any of those occur he/she must call cardiologist IMMEDIATELY or 911 or go to nearest emergency room   - Reviewed and reinforced a heart healthy diet, Smoking Cessation  - Patient verbalizes understanding of ALL OF THE ABOVE, and gives positive feedback     # HTN  continue carvedilol, losartan  DASH diet    # HLD  continue pravastatin    # Dispo  anticipate discharge in AM if site and condition remain stable    Roger JIMÉNEZANNELIESE  Invasive Cardiology  Ext [phone]   HPI: 88M with CAD s/p MI with 2 stents in 2006, subsequent ICD implantation for 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  Last ICD interrogation 11/22 without events or significant ectopy  Maintained on amio 100mg x 4 years  Known dilated aorta, 5.0 cm on TTE, (4.8cm in 2014), TTE: 55-60%, mod LVH, mild DD  No ICD shocks   Recent ICD check showing 1 VT episode in Feb 2021 terminated via 1 ATP, 22 beats of VT in May 2022 self terminated.  Recent cath on 2/4/23 reveals  1. Severe single vessel CAD involving proximal RCA .   2. Mild disease in LAD, LCx, and Ramus.   3. Plan for staged RCA  PCI in next few weeks.   4. Recommend aggressive risk factor modification and medical therapy  for CAD.  Seen & evaluated by cardiologist Dr carisa Villa & now recommends for [facility] for .     # CAD  2/24 s/p failed PCI or RCA  via RFA perclose  Right groin w/o bleeding or hematoma. soft, non tender.  Pulses in the right lower extremity are palpable.   Denies chest pain, denies groin/leg/foot: pain, numbness or tingling   cont aspirin  cont antihypertensive- losartan, carvedilol  cont statin- simvastatin  monitor telemetry  Keep Mg >2 K >4  f/u appt in 2 weeks post dc with oupt cardiologist    - Reviewed and reinforced with patient:  wound care instructions, activities dos and donts, medication compliance specifically antiplatelet therapy given stent/s.    - Patient aware to take DAPT  as prescribed and DO NOT STOP taking without consulting cardiologist first or STENT/s WILL CLOSE  - Reviewed and reinforced with patient:  site complications ( eg: bleeding, excruciating pain at the procedural site, large sweliing-golf ball size-  extremity numbness, tingling, temperature change), or CHEST PAIN; pt aware that if any of those occur he/she must call cardiologist IMMEDIATELY or 911 or go to nearest emergency room   - Reviewed and reinforced a heart healthy diet, Smoking Cessation  - Patient verbalizes understanding of ALL OF THE ABOVE, and gives positive feedback     # HTN  continue carvedilol, losartan  DASH diet    # HLD  continue pravastatin    # Dispo  anticipate discharge in AM if site and condition remain stable  will return in a few weeks for reattempt of PCI of RCA     Roger Natarajan [facility]  Invasive Cardiology  Ext [phone]

## 2023-02-25 NOTE — DISCHARGE NOTE NURSING/CASE MANAGEMENT/SOCIAL WORK - NSDCPEFALRISK_GEN_ALL_CORE
For information on Fall & Injury Prevention, visit: [email or web address] OR  [email or web address] OR  https://www.cdc.gov/steadi/patient.html

## 2023-02-25 NOTE — PROGRESS NOTE ADULT - SUBJECTIVE AND OBJECTIVE BOX
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  Last ICD interrogation  without events or significant ectopy  Maintained on amio 100mg x 4 years  Known dilated aorta, 5.0 cm on TTE, (4.8cm in ), TTE: 55-60%, mod LVH, mild DD  No ICD shocks   Recent ICD check showing 1 VT episode in 2021 terminated via 1 ATP, 22 beats of VT in May 2022 self terminated.  Recent cath on 23 reveals  1. Severe single vessel CAD involving proximal RCA .   2. Mild disease in LAD, LCx, and Ramus.   3. Plan for staged RCA  PCI in next few weeks.   4. Recommend aggressive risk factor modification and medical therapy  for CAD.  Seen & evaluated by cardiologist Dr long Villa & now recommends for [facility] for .     Subjective/Observations: patient seen and examined.  denies chest pain, dyspena, dizziness, palpitations, N&V, HA    Review of Systems all WNL except below indicated:    Constitutional: [ ] Fever [ ] Chills [ ] Fatigue [ ] Weight change   HEENT: [ ] Blurred vision [ ] Eye Pain [ ] Headache [ ] Runny nose [ ] Sore Throat   Respiratory: [ ] Cough [ ] Wheezing [ ] Shortness of breath  Cardiovascular: [ ] Chest Pain [ ] Palpitations [ ] WOMACK [ ] PND [ ] Orthopnea  Gastrointestinal: [ ] Abdominal Pain [ ] Diarrhea [ ] Constipation [ ] Hemorrhoids [ ] Nausea [ ] Vomiting  Genitourinary: [ ] Nocturia [ ] Dysuria [ ] Incontinence  Extremities: [ ] Swelling [ ] Joint Pain  Neurologic: [ ] Focal deficit [ ] Paresthesias [ ] Syncope  Lymphatic: [ ] Swelling [ ] Lymphadenopathy   Skin: [ ] Rash [ ] Ecchymoses [ ] Wounds [ ] Lesions  Psychiatry: [ ] Depression [ ] Suicidal/Homicidal Ideation [ ] Anxiety [ ] Sleep Disturbances  [ ] 10 point review of systems is otherwise negative except as mentioned above            [ ]Unable to obtain      PAST MEDICAL & SURGICAL HISTORY:  Coronary artery disease  s/p stents    Arrhythmia  s/p AICD/PPM    Hyperlipidemia    Hypertension    Benign prostatic hyperplasia    S/P hernia surgery  Emergent incarcerated Inguinal hernia  sx complicated by hematoma and anemia in 2019    S/P implantation of automatic cardioverter/defibrillator (AICD)  AND PPM    History of knee replacement    S/P coronary artery stent placement      MEDICATIONS  (STANDING):  aMIOdarone    Tablet 100 milliGRAM(s) Oral daily  aspirin enteric coated 81 milliGRAM(s) Oral daily  carvedilol 6.25 milliGRAM(s) Oral every 12 hours  clonazePAM  Tablet 0.5 milliGRAM(s) Oral daily  clonazePAM  Tablet 1 milliGRAM(s) Oral at bedtime  erythromycin   Ointment 1 Application(s) Both EYES two times a day  escitalopram 20 milliGRAM(s) Oral daily  finasteride 5 milliGRAM(s) Oral daily  influenza  Vaccine (HIGH DOSE) 0.7 milliLiter(s) IntraMuscular once  losartan 25 milliGRAM(s) Oral every 12 hours  mirtazapine 15 milliGRAM(s) Oral at bedtime  simvastatin 20 milliGRAM(s) Oral at bedtime  sodium chloride 0.9% Bolus 250 milliLiter(s) IV Bolus once  sodium chloride 0.9%. 1000 milliLiter(s) (75 mL/Hr) IV Continuous <Continuous>  tamsulosin 0.4 milliGRAM(s) Oral at bedtime    MEDICATIONS  (PRN):  acetaminophen     Tablet .. 650 milliGRAM(s) Oral every 6 hours PRN Mild Pain (1 - 3), Moderate Pain (4 - 6)      Allergies    No Known Allergies    Intolerances      Vital Signs Last 24 Hrs  T(C): 36.4 (2023 00:05), Max: 36.6 (2023 06:31)  T(F): 97.5 (2023 00:05), Max: 97.9 (2023 19:31)  HR: 64 (2023 00:05) (51 - 76)  BP: 132/51 (2023 00:05) (119/54 - 156/76)  BP(mean): 71 (2023 00:05) (71 - 104)  RR: 18 (2023 00:05) (14 - 20)  SpO2: 96% (2023 00:05) (94% - 98%)    Parameters below as of 2023 00:05  Patient On (Oxygen Delivery Method): room air    I&O's Summary    2023 07:01  -  2023 00:49  --------------------------------------------------------  IN: 0 mL / OUT: 550 mL / NET: -550 mL      Weight (kg): 68 ( @ 07:07)    FOCUSED PHYSICAL EXAM:  Pulmonary: Non-labored, breath sounds are clear bilaterally, No wheezing, rales or rhonchi  Cardiovascular: Regular, S1 and S2, No murmurs, rubs, gallops or clicks  cath site: right groin stable w/o bleeding or hematoma, soft, + pulses     LABS: All Labs Reviewed:                        13.4   6.49  )-----------( 206      ( 2023 07:13 )             40.6     2023 07:13    140    |  107    |  19     ----------------------------<  92     4.1     |  23     |  0.82     Ca    9.3        2023 07:13      RESULTS:    ECG:   Ventricular Rate 66 BPM    Atrial Rate 66 BPM    P-R Interval 246 ms    QRS Duration 108 ms    Q-T Interval 482 ms    QTC Calculation(Bazett) 505 ms    P Axis -22 degrees    R Axis -24 degrees    T Axis 95 degrees    Diagnosis Line Atrial-paced rhythm with prolonged AV conduction WITH OCCASIONAL supraventricular complexes AND WITH FREQUENT PREMATURE VENTRICULAR COMPLEXES  INCOMPLETE LEFT BUNDLE BRANCH BLOCK  MODERATE VOLTAGE CRITERIA FOR LVH, MAY BE NORMAL VARIANT ( Sokolow-Patel , [place] product )  NONSPECIFIC ST AND T WAVE ABNORMALITY  PROLONGED QT  ABNORMAL ECG  WHEN COMPARED WITH ECG OF 2023 16.45 SIGNIFICANT CHANGES HAVE OCCURRED    Confirmed by Wilson Douglas ([id number]) on 2023 4:02:12 PM    ECHO:  Patient name: JORDON OROZCO  YOB: 1934   Age: 88 (M)   MR#: [id number]  Study Date: 2/3/2023  Location: O/PSonographer: Tayler Dhillon RDCS  Study quality: Technically fair  Referring Physician: Jose Iyer MD  Blood Pressure: 107/64 mmHg  Height: 178 cm  Weight: 73 kg  BSA: 1.9 m2  ------------------------------------------------------------------------  PROCEDURE: Transthoracic echocardiogram with 2-D, M-Mode  and complete spectral and color flow Doppler. Verbal  consent was obtained for injection of  Ultrasonic Enhancing  Agent following a discussion of risks and benefits.  Following intravenous injection of Ultrasonic Enhancing  Agent, harmonic imaging was performed.  INDICATION: Dyspnea, unspecified (R06.00)  ------------------------------------------------------------------------  Dimensions:    Normal Values:  LA:     4.1    2.0 - 4.0 cm  Ao:     4.7    2.0 - 3.8 cm  SEPTUM: 1.1    0.6 - 1.2 cm  PWT:    0.9    0.6 - 1.1 cm  LVIDd:  5.9    3.0 - 5.6 cm  LVIDs:  5.2    1.8 - 4.0 cm  Derived variables:  LVMI: 126 g/m2  RWT: 0.30  Fractional short: 12 %  EF (Visual Estimate): 40 %  Doppler Peak Velocity (m/sec): AoV=1.7  ------------------------------------------------------------------------  Observations:  Mitral Valve: Tethered mitral valve leaflets with normal  opening. Mild mitral regurgitation.  Aortic Valve/Aorta: Normal trileaflet aortic valve. Peak  transaortic valve gradient equals 12 mm Hg, mean  transaortic valve gradient equals 7 mm Hg, aortic valve  velocity time integral equals 35 cm, estimated aortic valve  area equals 3 sqcm. Moderate aortic regurgitation. Peak  left ventricular outflow tract gradient equals 4 mm Hg,  mean gradient is equal to 2 mm Hg, LVOT velocity time  integral equals 25 cm.  Dilated Aortic Root: 4.7 cm. Ascending Aorta: 4.2 cm.  Left Atrium: Severely dilated left atrium.  LA volume index  = 55 cc/m2.  Left Ventricle: Endocardial visualization enhanced with  intravenous injection of Ultrasonic Enhancing Agent  (Definity).  Moderate segmental left ventricular  dysfunction. The mid to basal inferoseptal/mid to basal  inferolateral wall are akinetic. Mild left ventricular  enlargement. Increased E/e'  is consistent with elevated  left ventricular filling pressure.  Right Heart: A device wire is noted in the right heart.  Severe right atrial enlargement. Normal right ventricular  size and function. Mild tricuspid regurgitation. Normal  pulmonic valve.  Pericardium/Pleura: Normal pericardium with no pericardial  effusion.  Hemodynamic: Estimated right atrial pressure is 8 mm Hg.  Estimated right ventricular systolic pressure equals 31 mm  Hg, assuming right atrial pressure equals 8 mm Hg,  consistent with normal pulmonary pressures.  ------------------------------------------------------------------------  Conclusions:  1. Mild mitral regurgitation.  2. Moderate aortic regurgitation.  3. Dilated Aortic Root: 4.7 cm. Ascending Aorta: 4.2 cm.  4. Severely dilated left atrium.  LA volume index = 55  cc/m2.  5. Mild left ventricular enlargement.  6. Endocardial visualization enhanced with intravenous  injection of Ultrasonic Enhancing Agent (Definity).  Moderate segmental left ventricular dysfunction. The mid to  basal inferoseptal/mid to basal inferolateral wall are  akinetic.  7. Increased E/e'  is consistent with elevated left  ventricular filling pressure.  8. A device wire is noted in the right heart. Severe right  atrial enlargement.  9. Normal right ventricular size and function.  10. Mild tricuspid regurgitation.  *** No previous Echo exam.  ------------------------------------------------------------------------  Confirmed on  2/3/2023 - 14:54:18 by Anibal Resendez M.D.  ------------------------------------------------------------------------    CATH REPORT:  Study Date:     2023   Name:           JORDON OROZCO   :            1934   (88 years)   Gender:         male   MR#:            [id number]   MPI#:           [id number]   Patient Class:  Inpatient     Cath Lab Report    Interventional Cardiologist:   Josh Sanchez MD   Fellow:                        Lamar Cross, Fellow   Referring Physician:           Long Villa MD     Procedures Performed   Procedures:               1.    Arterial Access - Right Femoral   2.    Ultrasound Guided Access   3.    PCI:    4.    PCI: POBA   5.    Perclose     Indications:               Staged Procedures   Angina     Lab Visit Indication:      new onset angina (less than or equal to 2  months)  PCI Status:                elective     Conclusions:   1. Unsuccessful RCA  PCI due to unsuccessful wire crossing.   2. Will plan for re-attempt  PCI in next few weeks.

## 2023-02-26 ENCOUNTER — TRANSCRIPTION ENCOUNTER (OUTPATIENT)
Age: 88
End: 2023-02-26

## 2023-02-27 ENCOUNTER — NON-APPOINTMENT (OUTPATIENT)
Age: 88
End: 2023-02-27

## 2023-02-27 ENCOUNTER — TRANSCRIPTION ENCOUNTER (OUTPATIENT)
Age: 88
End: 2023-02-27

## 2023-03-06 LAB — SARS-COV-2 N GENE NPH QL NAA+PROBE: NOT DETECTED

## 2023-03-09 ENCOUNTER — TRANSCRIPTION ENCOUNTER (OUTPATIENT)
Age: 88
End: 2023-03-09

## 2023-03-10 ENCOUNTER — TRANSCRIPTION ENCOUNTER (OUTPATIENT)
Age: 88
End: 2023-03-10

## 2023-03-13 ENCOUNTER — APPOINTMENT (OUTPATIENT)
Dept: CARDIOLOGY | Facility: CLINIC | Age: 88
End: 2023-03-13
Payer: MEDICARE

## 2023-03-13 VITALS
BODY MASS INDEX: 25.66 KG/M2 | HEART RATE: 60 BPM | WEIGHT: 154 LBS | DIASTOLIC BLOOD PRESSURE: 63 MMHG | SYSTOLIC BLOOD PRESSURE: 111 MMHG | HEIGHT: 65 IN | OXYGEN SATURATION: 97 % | TEMPERATURE: 97.8 F

## 2023-03-13 DIAGNOSIS — I47.20 VENTRICULAR TACHYCARDIA, UNSPECIFIED: ICD-10-CM

## 2023-03-13 PROCEDURE — 99215 OFFICE O/P EST HI 40 MIN: CPT

## 2023-03-13 NOTE — PHYSICAL EXAM

## 2023-03-13 NOTE — HISTORY OF PRESENT ILLNESS
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, HFrEF, on amiodarone for PVC's who presents for follow up.\par \par Pt was sent for cardiac cath,  of RCA. S/p unsuccessful attempt at - plan on bringing him back in 1 month \par Was noting that his dyspnea was getting worse after cath\par Started on lasix with maybe a slight improvement in symptoms\par He had a TTE done in the hospital which was notable for an EF of 40% with inferior hypokinesis, elevated filling pressures\par Gets tired more easily \par \par Last ICD interrogation 11/22 without events or significant ectopy\par Maintained on amio 100mg x 4 years\par Known dilated aorta, 5.0 cm on TTE, (4.8cm in 2014)\par No ICD shocks \par \par -----------------\par \par HISTORY:\par \par Recent ICD check showing 1 VT episode in Feb 2021 terminated via 1 ATP, 22 beats of VT in May 2022 self terminated\par He does have rare episodes of syncope which usually occur with his anxiety. Neuro thought possible seizures, no correlation with arrhythmia on the monitor. \par \par ------------------\par \par Never smoker. 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

## 2023-03-13 NOTE — DISCUSSION/SUMMARY
[FreeTextEntry1] : Dyspnea: Possibly related to fluid overload, especially considering reduced EF and elevated filling pressures on echo. He may be having a response to lasix, started 2 weeks ago. ?anginal equivalent with  of RCA\par \par Cont lasix 20mg daily\par Pending repeat PCI attempt to  of RCA with Dr. Sanchez\par \par CAD s/p PCI: 2006. Now with  of RCA, pending repeat PCI\par \par Cont asa, simvastatin- consider higher intensity statin \par \par HTN: BP great today, cont meds\par \par NSVT, PVC's: None recently.  Maintained on amiodarone 100mg, Coreg BID. Monitor TFT's, LFT's, PFT's, regular ophtho appointments \par \par ICD: Interrogations at J\par \par Dilated aorta- 5.0 cm ascending aortic aneurysm.. Consider CT Sx eval but can hold off given stability and age. BP control, avoid heavy lifting.  Serial echos/CT scans\par \par RV 3M\par \par

## 2023-03-28 ENCOUNTER — INPATIENT (INPATIENT)
Facility: HOSPITAL | Age: 88
LOS: 0 days | Discharge: HOME | DRG: 287 | End: 2023-03-29
Attending: INTERNAL MEDICINE | Admitting: INTERNAL MEDICINE
Payer: COMMERCIAL

## 2023-03-28 VITALS
TEMPERATURE: 98 F | HEART RATE: 60 BPM | SYSTOLIC BLOOD PRESSURE: 116 MMHG | RESPIRATION RATE: 18 BRPM | HEIGHT: 72 IN | WEIGHT: 158.07 LBS | DIASTOLIC BLOOD PRESSURE: 60 MMHG | OXYGEN SATURATION: 96 %

## 2023-03-28 DIAGNOSIS — Z95.5 PRESENCE OF CORONARY ANGIOPLASTY IMPLANT AND GRAFT: Chronic | ICD-10-CM

## 2023-03-28 DIAGNOSIS — Z96.659 PRESENCE OF UNSPECIFIED ARTIFICIAL KNEE JOINT: Chronic | ICD-10-CM

## 2023-03-28 DIAGNOSIS — I25.10 ATHEROSCLEROTIC HEART DISEASE OF NATIVE CORONARY ARTERY WITHOUT ANGINA PECTORIS: ICD-10-CM

## 2023-03-28 DIAGNOSIS — Z95.810 PRESENCE OF AUTOMATIC (IMPLANTABLE) CARDIAC DEFIBRILLATOR: Chronic | ICD-10-CM

## 2023-03-28 DIAGNOSIS — Z98.890 OTHER SPECIFIED POSTPROCEDURAL STATES: Chronic | ICD-10-CM

## 2023-03-28 LAB
ALBUMIN SERPL ELPH-MCNC: 3.8 G/DL — SIGNIFICANT CHANGE UP (ref 3.3–5)
ALP SERPL-CCNC: 77 U/L — SIGNIFICANT CHANGE UP (ref 40–120)
ALT FLD-CCNC: 9 U/L — LOW (ref 10–45)
ANION GAP SERPL CALC-SCNC: 9 MMOL/L — SIGNIFICANT CHANGE UP (ref 5–17)
AST SERPL-CCNC: 16 U/L — SIGNIFICANT CHANGE UP (ref 10–40)
BILIRUB SERPL-MCNC: 0.8 MG/DL — SIGNIFICANT CHANGE UP (ref 0.2–1.2)
BLD GP AB SCN SERPL QL: NEGATIVE — SIGNIFICANT CHANGE UP
BUN SERPL-MCNC: 27 MG/DL — HIGH (ref 7–23)
CALCIUM SERPL-MCNC: 8.8 MG/DL — SIGNIFICANT CHANGE UP (ref 8.4–10.5)
CHLORIDE SERPL-SCNC: 104 MMOL/L — SIGNIFICANT CHANGE UP (ref 96–108)
CO2 SERPL-SCNC: 27 MMOL/L — SIGNIFICANT CHANGE UP (ref 22–31)
CREAT SERPL-MCNC: 1.06 MG/DL — SIGNIFICANT CHANGE UP (ref 0.5–1.3)
EGFR: 68 ML/MIN/1.73M2 — SIGNIFICANT CHANGE UP
GLUCOSE SERPL-MCNC: 88 MG/DL — SIGNIFICANT CHANGE UP (ref 70–99)
HCT VFR BLD CALC: 40 % — SIGNIFICANT CHANGE UP (ref 39–50)
HGB BLD-MCNC: 13 G/DL — SIGNIFICANT CHANGE UP (ref 13–17)
MAGNESIUM SERPL-MCNC: 2 MG/DL — SIGNIFICANT CHANGE UP (ref 1.6–2.6)
MCHC RBC-ENTMCNC: 31.9 PG — SIGNIFICANT CHANGE UP (ref 27–34)
MCHC RBC-ENTMCNC: 32.5 GM/DL — SIGNIFICANT CHANGE UP (ref 32–36)
MCV RBC AUTO: 98 FL — SIGNIFICANT CHANGE UP (ref 80–100)
NRBC # BLD: 0 /100 WBCS — SIGNIFICANT CHANGE UP (ref 0–0)
PLATELET # BLD AUTO: 192 K/UL — SIGNIFICANT CHANGE UP (ref 150–400)
POTASSIUM SERPL-MCNC: 4.1 MMOL/L — SIGNIFICANT CHANGE UP (ref 3.5–5.3)
POTASSIUM SERPL-SCNC: 4.1 MMOL/L — SIGNIFICANT CHANGE UP (ref 3.5–5.3)
PROT SERPL-MCNC: 6.5 G/DL — SIGNIFICANT CHANGE UP (ref 6–8.3)
RBC # BLD: 4.08 M/UL — LOW (ref 4.2–5.8)
RBC # FLD: 13.2 % — SIGNIFICANT CHANGE UP (ref 10.3–14.5)
RH IG SCN BLD-IMP: POSITIVE — SIGNIFICANT CHANGE UP
SODIUM SERPL-SCNC: 140 MMOL/L — SIGNIFICANT CHANGE UP (ref 135–145)
WBC # BLD: 6.67 K/UL — SIGNIFICANT CHANGE UP (ref 3.8–10.5)
WBC # FLD AUTO: 6.67 K/UL — SIGNIFICANT CHANGE UP (ref 3.8–10.5)

## 2023-03-28 PROCEDURE — 93010 ELECTROCARDIOGRAM REPORT: CPT

## 2023-03-28 PROCEDURE — 99152 MOD SED SAME PHYS/QHP 5/>YRS: CPT

## 2023-03-28 PROCEDURE — 92920 PRQ TRLUML C ANGIOP 1ART&/BR: CPT | Mod: RC

## 2023-03-28 RX ORDER — AMIODARONE HYDROCHLORIDE 400 MG/1
100 TABLET ORAL DAILY
Refills: 0 | Status: DISCONTINUED | OUTPATIENT
Start: 2023-03-28 | End: 2023-03-29

## 2023-03-28 RX ORDER — CARVEDILOL PHOSPHATE 80 MG/1
6.25 CAPSULE, EXTENDED RELEASE ORAL EVERY 12 HOURS
Refills: 0 | Status: DISCONTINUED | OUTPATIENT
Start: 2023-03-28 | End: 2023-03-29

## 2023-03-28 RX ORDER — MIRTAZAPINE 45 MG/1
1 TABLET, ORALLY DISINTEGRATING ORAL
Qty: 0 | Refills: 0 | DISCHARGE

## 2023-03-28 RX ORDER — AMIODARONE HYDROCHLORIDE 400 MG/1
1 TABLET ORAL
Qty: 0 | Refills: 0 | DISCHARGE

## 2023-03-28 RX ORDER — LOSARTAN POTASSIUM 100 MG/1
25 TABLET, FILM COATED ORAL EVERY 12 HOURS
Refills: 0 | Status: DISCONTINUED | OUTPATIENT
Start: 2023-03-28 | End: 2023-03-29

## 2023-03-28 RX ORDER — FINASTERIDE 5 MG/1
5 TABLET, FILM COATED ORAL DAILY
Refills: 0 | Status: DISCONTINUED | OUTPATIENT
Start: 2023-03-28 | End: 2023-03-29

## 2023-03-28 RX ORDER — LOSARTAN POTASSIUM 100 MG/1
1 TABLET, FILM COATED ORAL
Qty: 0 | Refills: 0 | DISCHARGE

## 2023-03-28 RX ORDER — ERYTHROMYCIN BASE 5 MG/GRAM
1 OINTMENT (GRAM) OPHTHALMIC (EYE)
Refills: 0 | Status: DISCONTINUED | OUTPATIENT
Start: 2023-03-28 | End: 2023-03-29

## 2023-03-28 RX ORDER — ESCITALOPRAM OXALATE 10 MG/1
1 TABLET, FILM COATED ORAL
Qty: 0 | Refills: 0 | DISCHARGE

## 2023-03-28 RX ORDER — SODIUM CHLORIDE 9 MG/ML
1000 INJECTION INTRAMUSCULAR; INTRAVENOUS; SUBCUTANEOUS
Refills: 0 | Status: DISCONTINUED | OUTPATIENT
Start: 2023-03-28 | End: 2023-03-28

## 2023-03-28 RX ORDER — ASPIRIN/CALCIUM CARB/MAGNESIUM 324 MG
1 TABLET ORAL
Qty: 0 | Refills: 0 | DISCHARGE

## 2023-03-28 RX ORDER — CLONAZEPAM 1 MG
1 TABLET ORAL AT BEDTIME
Refills: 0 | Status: DISCONTINUED | OUTPATIENT
Start: 2023-03-28 | End: 2023-03-29

## 2023-03-28 RX ORDER — TAMSULOSIN HYDROCHLORIDE 0.4 MG/1
0.4 CAPSULE ORAL AT BEDTIME
Refills: 0 | Status: DISCONTINUED | OUTPATIENT
Start: 2023-03-28 | End: 2023-03-29

## 2023-03-28 RX ORDER — CLONAZEPAM 1 MG
0.5 TABLET ORAL
Refills: 0 | Status: DISCONTINUED | OUTPATIENT
Start: 2023-03-28 | End: 2023-03-29

## 2023-03-28 RX ORDER — ASPIRIN/CALCIUM CARB/MAGNESIUM 324 MG
81 TABLET ORAL DAILY
Refills: 0 | Status: DISCONTINUED | OUTPATIENT
Start: 2023-03-28 | End: 2023-03-29

## 2023-03-28 RX ORDER — CLONAZEPAM 1 MG
1 TABLET ORAL
Qty: 0 | Refills: 0 | DISCHARGE

## 2023-03-28 RX ORDER — FINASTERIDE 5 MG/1
1 TABLET, FILM COATED ORAL
Qty: 0 | Refills: 0 | DISCHARGE

## 2023-03-28 RX ORDER — MIRTAZAPINE 45 MG/1
15 TABLET, ORALLY DISINTEGRATING ORAL AT BEDTIME
Refills: 0 | Status: DISCONTINUED | OUTPATIENT
Start: 2023-03-28 | End: 2023-03-29

## 2023-03-28 RX ORDER — ERYTHROMYCIN BASE 5 MG/GRAM
1 OINTMENT (GRAM) OPHTHALMIC (EYE)
Qty: 0 | Refills: 0 | DISCHARGE

## 2023-03-28 RX ORDER — SODIUM CHLORIDE 9 MG/ML
250 INJECTION INTRAMUSCULAR; INTRAVENOUS; SUBCUTANEOUS ONCE
Refills: 0 | Status: COMPLETED | OUTPATIENT
Start: 2023-03-28 | End: 2023-03-28

## 2023-03-28 RX ORDER — SIMVASTATIN 20 MG/1
1 TABLET, FILM COATED ORAL
Qty: 0 | Refills: 0 | DISCHARGE

## 2023-03-28 RX ORDER — SIMVASTATIN 20 MG/1
20 TABLET, FILM COATED ORAL AT BEDTIME
Refills: 0 | Status: DISCONTINUED | OUTPATIENT
Start: 2023-03-28 | End: 2023-03-29

## 2023-03-28 RX ORDER — ESCITALOPRAM OXALATE 10 MG/1
20 TABLET, FILM COATED ORAL DAILY
Refills: 0 | Status: DISCONTINUED | OUTPATIENT
Start: 2023-03-28 | End: 2023-03-29

## 2023-03-28 RX ORDER — TAMSULOSIN HYDROCHLORIDE 0.4 MG/1
1 CAPSULE ORAL
Qty: 0 | Refills: 0 | DISCHARGE

## 2023-03-28 RX ADMIN — SODIUM CHLORIDE 500 MILLILITER(S): 9 INJECTION INTRAMUSCULAR; INTRAVENOUS; SUBCUTANEOUS at 06:59

## 2023-03-28 RX ADMIN — LOSARTAN POTASSIUM 25 MILLIGRAM(S): 100 TABLET, FILM COATED ORAL at 19:44

## 2023-03-28 RX ADMIN — MIRTAZAPINE 15 MILLIGRAM(S): 45 TABLET, ORALLY DISINTEGRATING ORAL at 21:55

## 2023-03-28 RX ADMIN — SIMVASTATIN 20 MILLIGRAM(S): 20 TABLET, FILM COATED ORAL at 21:55

## 2023-03-28 RX ADMIN — CARVEDILOL PHOSPHATE 6.25 MILLIGRAM(S): 80 CAPSULE, EXTENDED RELEASE ORAL at 19:45

## 2023-03-28 RX ADMIN — TAMSULOSIN HYDROCHLORIDE 0.4 MILLIGRAM(S): 0.4 CAPSULE ORAL at 21:55

## 2023-03-28 RX ADMIN — Medication 1 APPLICATION(S): at 21:56

## 2023-03-28 RX ADMIN — SODIUM CHLORIDE 75 MILLILITER(S): 9 INJECTION INTRAMUSCULAR; INTRAVENOUS; SUBCUTANEOUS at 06:59

## 2023-03-28 RX ADMIN — Medication 1 MILLIGRAM(S): at 21:56

## 2023-03-28 RX ADMIN — Medication 81 MILLIGRAM(S): at 13:20

## 2023-03-28 NOTE — PATIENT PROFILE ADULT - FUNCTIONAL ASSESSMENT - BASIC MOBILITY 6.
3-calculated by average/Not able to assess (calculate score using [facility] averaging method)

## 2023-03-28 NOTE — H&P CARDIOLOGY - HISTORY OF PRESENT ILLNESS
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  Last ICD interrogation 11/22 without events or significant ectopy  Maintained on amio 100mg x 4 years  Known dilated aorta, 5.0 cm on TTE, (4.8cm in 2014), TTE: 55-60%, mod LVH, mild DD  No ICD shocks   Recent ICD check showing 1 VT episode in Feb 2021 terminated via 1 ATP, 22 beats of VT in May 2022 self terminated.  Recent cath on 2/4/23 reveals  1. Severe single vessel CAD involving proximal RCA .   2. Mild disease in LAD, LCx, and Ramus.   3. Plan for staged RCA  PCI in next few weeks.   4. Recommend aggressive risk factor modification and medical therapy  for CAD.  Seen & evaluated by cardiologist Dr carisa Villa & now recommends for [facility] for .  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  Last ICD interrogation 11/22 without events or significant ectopy  Maintained on amio 100mg x 4 years  Known dilated aorta, 5.0 cm on TTE, (4.8cm in 2014), TTE: 55-60%, mod LVH, mild DD  No ICD shocks   Recent ICD check showing 1 VT episode in Feb 2021 terminated via 1 ATP, 22 beats of VT in May 2022 self terminated.  2/4/23 cath reveals:  1. Severe single vessel CAD involving proximal RCA .   2. Mild disease in LAD, LCx, and Ramus.   3. Plan for staged RCA  PCI in next few weeks.   4. Recommend aggressive risk factor modification and medical therapy for CAD.  2/24/23 cath conclusions:   1. Unsuccessful RCA  PCI due to unsuccessful wire crossing.   2. Will plan for re-attempt  PCI in next few weeks.   Followed by outpatient cardiologist Dr Long Villa. Pt feels well this morning. Here today for reattempt to PCI to RCA .

## 2023-03-28 NOTE — PATIENT PROFILE ADULT - FALL HARM RISK - HARM RISK INTERVENTIONS

## 2023-03-28 NOTE — ASU PATIENT PROFILE, ADULT - FALL HARM RISK - HARM RISK INTERVENTIONS

## 2023-03-29 ENCOUNTER — TRANSCRIPTION ENCOUNTER (OUTPATIENT)
Age: 88
End: 2023-03-29

## 2023-03-29 VITALS
TEMPERATURE: 98 F | SYSTOLIC BLOOD PRESSURE: 131 MMHG | RESPIRATION RATE: 17 BRPM | HEART RATE: 63 BPM | OXYGEN SATURATION: 98 % | DIASTOLIC BLOOD PRESSURE: 72 MMHG

## 2023-03-29 DIAGNOSIS — I10 ESSENTIAL (PRIMARY) HYPERTENSION: ICD-10-CM

## 2023-03-29 DIAGNOSIS — I25.10 ATHEROSCLEROTIC HEART DISEASE OF NATIVE CORONARY ARTERY WITHOUT ANGINA PECTORIS: ICD-10-CM

## 2023-03-29 DIAGNOSIS — E78.5 HYPERLIPIDEMIA, UNSPECIFIED: ICD-10-CM

## 2023-03-29 PROCEDURE — C1894: CPT

## 2023-03-29 PROCEDURE — 99232 SBSQ HOSP IP/OBS MODERATE 35: CPT

## 2023-03-29 PROCEDURE — 80053 COMPREHEN METABOLIC PANEL: CPT

## 2023-03-29 PROCEDURE — 85027 COMPLETE CBC AUTOMATED: CPT

## 2023-03-29 PROCEDURE — 86901 BLOOD TYPING SEROLOGIC RH(D): CPT

## 2023-03-29 PROCEDURE — 83735 ASSAY OF MAGNESIUM: CPT

## 2023-03-29 PROCEDURE — 86850 RBC ANTIBODY SCREEN: CPT

## 2023-03-29 PROCEDURE — 86900 BLOOD TYPING SEROLOGIC ABO: CPT

## 2023-03-29 PROCEDURE — C1760: CPT

## 2023-03-29 PROCEDURE — 92920 PRQ TRLUML C ANGIOP 1ART&/BR: CPT | Mod: RC

## 2023-03-29 PROCEDURE — 93454 CORONARY ARTERY ANGIO S&I: CPT

## 2023-03-29 PROCEDURE — C1887: CPT

## 2023-03-29 PROCEDURE — C1769: CPT

## 2023-03-29 PROCEDURE — 93005 ELECTROCARDIOGRAM TRACING: CPT

## 2023-03-29 RX ADMIN — AMIODARONE HYDROCHLORIDE 100 MILLIGRAM(S): 400 TABLET ORAL at 05:39

## 2023-03-29 RX ADMIN — Medication 81 MILLIGRAM(S): at 05:40

## 2023-03-29 RX ADMIN — LOSARTAN POTASSIUM 25 MILLIGRAM(S): 100 TABLET, FILM COATED ORAL at 05:39

## 2023-03-29 RX ADMIN — CARVEDILOL PHOSPHATE 6.25 MILLIGRAM(S): 80 CAPSULE, EXTENDED RELEASE ORAL at 05:39

## 2023-03-29 RX ADMIN — Medication 1 APPLICATION(S): at 06:07

## 2023-03-29 RX ADMIN — Medication 0.5 MILLIGRAM(S): at 05:39

## 2023-03-29 NOTE — PROGRESS NOTE ADULT - SUBJECTIVE AND OBJECTIVE BOX
HPI:  88M with CAD s/p MI with 2 stents in 2006, subsequent ICD implantation for 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  Last ICD interrogation 11/22 without events or significant ectopy  Maintained on amio 100mg x 4 years  Known dilated aorta, 5.0 cm on TTE, (4.8cm in 2014), TTE: 55-60%, mod LVH, mild DD  No ICD shocks   Recent ICD check showing 1 VT episode in Feb 2021 terminated via 1 ATP, 22 beats of VT in May 2022 self terminated.  2/4/23 cath reveals:  1. Severe single vessel CAD involving proximal RCA .   2. Mild disease in LAD, LCx, and Ramus.   3. Plan for staged RCA  PCI in next few weeks.   4. Recommend aggressive risk factor modification and medical therapy for CAD.  2/24/23 cath conclusions:   1. Unsuccessful RCA  PCI due to unsuccessful wire crossing.   2. Will plan for re-attempt  PCI in next few weeks.   Followed by outpatient cardiologist Dr Long Villa. Pt feels well this morning. Here today for reattempt to PCI to RCA .  (28 Mar 2023 06:38)    Patient is a 88y old  Male who presents with a chief complaint of RCA  (29 Mar 2023 02:56)          Allergies    No Known Allergies    Intolerances        Medications:  aMIOdarone    Tablet 100 milliGRAM(s) Oral daily  aspirin enteric coated 81 milliGRAM(s) Oral daily  carvedilol 6.25 milliGRAM(s) Oral every 12 hours  clonazePAM  Tablet 0.5 milliGRAM(s) Oral <User Schedule>  clonazePAM  Tablet 1 milliGRAM(s) Oral at bedtime  erythromycin   Ointment 1 Application(s) Both EYES two times a day  escitalopram 20 milliGRAM(s) Oral daily  finasteride 5 milliGRAM(s) Oral daily  losartan 25 milliGRAM(s) Oral every 12 hours  mirtazapine 15 milliGRAM(s) Oral at bedtime  simvastatin 20 milliGRAM(s) Oral at bedtime  tamsulosin 0.4 milliGRAM(s) Oral at bedtime      Vitals:  T(C): 36.7 (03-29-23 @ 04:00), Max: 36.7 (03-28-23 @ 06:34)  HR: 61 (03-29-23 @ 04:00) (59 - 68)  BP: 121/54 (03-29-23 @ 04:00) (98/49 - 155/72)  BP(mean): 68 (03-29-23 @ 04:00) (52 - 98)  RR: 18 (03-29-23 @ 04:00) (16 - 22)  SpO2: 93% (03-29-23 @ 04:00) (93% - 100%)  Wt(kg): --  Daily Height in cm: 182.88 (28 Mar 2023 06:34)    Daily   I&O's Summary    28 Mar 2023 07:01  -  29 Mar 2023 05:38  --------------------------------------------------------  IN: 240 mL / OUT: 675 mL / NET: -435 mL          Physical Exam:  Appearance: Normal  Eyes: PERRL, EOMI  HENT: Normal oral muscosa, NC/AT  Cardiovascular: S1S2, RRR, No M/R/G, no JVD, No Lower extremity edema  Procedural Access Site: No hematoma, Non-tender to palpation, 2+ pulse, No bruit, No Ecchymosis  Respiratory: Clear to auscultation bilaterally  Gastrointestinal: Soft, Non tender, Normal Bowel Sounds  Musculoskeletal: No clubbing, No joint deformity   Neurologic: Non-focal  Lymphatic: No lymphadenopathy  Psychiatry: AAOx3, Mood & affect appropriate  Skin: No rashes, No ecchymoses, No cyanosis    03-28    140  |  104  |  27<H>  ----------------------------<  88  4.1   |  27  |  1.06    Ca    8.8      28 Mar 2023 06:48  Mg     2.0     03-28    TPro  6.5  /  Alb  3.8  /  TBili  0.8  /  DBili  x   /  AST  16  /  ALT  9<L>  /  AlkPhos  77  03-28            Lipid panel   Hgb A1c                         13.0   6.67  )-----------( 192      ( 28 Mar 2023 06:48 )             40.0         ECG: A-paced 61 bpm

## 2023-03-29 NOTE — DISCHARGE NOTE PROVIDER - HOSPITAL COURSE
HPI:  88M with CAD s/p MI with 2 stents in 2006, subsequent ICD implantation for 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  Last ICD interrogation 11/22 without events or significant ectopy  Maintained on Amiodarone 100mg x 4 years  Known dilated aorta, 5.0 cm on TTE, (4.8cm in 2014), TTE: 55-60%, mod LVH, mild DD  No ICD shocks   Recent ICD check showing 1 VT episode in Feb 2021 terminated via 1 ATP, 22 beats of VT in May 2022 self terminated.  2/4/23 cath reveals:  1. Severe single vessel CAD involving proximal RCA .   2. Mild disease in LAD, LCx, and Ramus.   3. Plan for staged RCA  PCI in next few weeks.   4. Recommend aggressive risk factor modification and medical therapy for CAD.  2/24/23 cath conclusions:   1. Unsuccessful RCA  PCI due to unsuccessful wire crossing.   2. Will plan for re-attempt  PCI in next few weeks.   Followed by outpatient cardiologist Dr Long Villa. Pt feels well this morning. Here today for reattempt to PCI to RCA .  (28 Mar 2023 06:38)    3/28 s/p unsuccessful RCA  repair. Right femoral access site without swelling, bleeding.   HPI:  88M with CAD s/p MI with 2 stents in 2006, subsequent ICD implantation for 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  Last ICD interrogation 11/22 without events or significant ectopy  Maintained on Amiodarone 100mg x 4 years  Known dilated aorta, 5.0 cm on TTE, (4.8cm in 2014), TTE: 55-60%, mod LVH, mild DD  No ICD shocks   Recent ICD check showing 1 VT episode in Feb 2021 terminated via 1 ATP, 22 beats of VT in May 2022 self terminated.  2/4/23 cath reveals:  1. Severe single vessel CAD involving proximal RCA .   2. Mild disease in LAD, LCx, and Ramus.   3. Plan for staged RCA  PCI in next few weeks.   4. Recommend aggressive risk factor modification and medical therapy for CAD.  2/24/23 cath conclusions:   1. Unsuccessful RCA  PCI due to unsuccessful wire crossing.   2. Will plan for re-attempt  PCI in next few weeks.   Followed by outpatient cardiologist Dr Long Villa. Pt feels well this morning. Here today for reattempt to PCI to RCA .  (28 Mar 2023 06:38)    3/28 s/p unsuccessful RCA  repair. Right femoral access site without swelling, bleeding.  3/29 RFA stable, soft w/o bleeding or hematoma, cleare dfor dc home this am f.u with outpt cards

## 2023-03-29 NOTE — DISCHARGE NOTE PROVIDER - NSDCFUADDINST_GEN_ALL_CORE_FT
Wound Care:   the day AFTER your procedure remove bandage GENTTLY, and clean using  mild soap and gentle warm, water stream, pat dry. leave OPEN to air. YOU MAY SHOWER   DO NOT apply lotions, creams, ointments, powder, parfumes to your incision site  DO NOT SOAK your site for 1 week ( no baths, no pools, no tubs, etc...)  Check  your groin and /or wirst daily.A small amount of bruising, and soarness are normal    ACTIVITY: for 24 hours   - DO NOT DRIVE  - DO NOT make any important decisions or sign legal documents   - DO NOT operate heavy machinaries   - you may resume sexual activity in 48 hours, unless otherwise instructed by your cardiologist     If your procedure was done through the WRIST: for the NEXT 3DAYS:  - avoid pushing, pulling, with that affected wrist   - avoid repeated movement of that hand and wrist ( eg: typing, hammering)  - DO NOT LIFT anything more than 5 lbs     If your procedure was done through the GROIN: for the NEXT 5 DAYS  - Limit climbing stairs, DO NOT soak in bathtub or pool  - no strenous activities, pushing, pulling, straining  - Do not lift anything 10lbs or heavier     MEDICATION:   take your medications as explained ( see discharge paperwork)   If you received a STENT, you will be taking antiplatelet medications to KEEP YOUR STENT OPEN ( eg: Aspirin, Plavix, Brilinta, Effient, etc).  Take as prescribed DO NOT STOP taking them without consulting with your cardiologist first.     Follow heart healthy diet reccomended by your doctor, , if you smoke STOP SMOKING ( may call [phone] for center of tobacco control if you need assistance)     CALL your doctor to make appointment in 2 WEEKS     ***CALL YOUR DOCTOR***  if you experience: fever, chills, body aches, or severe pain, swelling, redness, heat or yellow discharge at incision site  If you experience Bleeding or excruciating pain at the procedural site, sweliing ( golf ball size) at your procedural site  If you experience CHEST PAIN  If you experience extremity numbness, tingling, temperature change ( of your procedural site)   If you are unable to reach your doctor, you may contact:   -Cardiology Office at [facility] at [phone] or   - [facility] [phone] - [facility] [phone]

## 2023-03-29 NOTE — DISCHARGE NOTE NURSING/CASE MANAGEMENT/SOCIAL WORK - PATIENT PORTAL LINK FT
You can access the FollowMyHealth Patient Portal offered by [facility] by registering at the following website: [email or web address]. By joining [email or web address]’s FollowMyHealth portal, you will also be able to view your health information using other applications (apps) compatible with our system.

## 2023-03-29 NOTE — DISCHARGE NOTE PROVIDER - NSDCFUSCHEDAPPT_GEN_ALL_CORE_FT
[facility] [id number]  Scheduled Appointment: 05/12/2023    [place]  [facility]  CARDIOLOGY [address]   Scheduled Appointment: 06/12/2023

## 2023-03-29 NOTE — DISCHARGE NOTE PROVIDER - CARE PROVIDER_API CALL
[facility] [place])  Internal Medicine  [address] [place] [address]  Phone: [phone]  Fax: [phone]  Follow Up Time: 2 weeks

## 2023-03-29 NOTE — DISCHARGE NOTE PROVIDER - NSDCCPTREATMENT_GEN_ALL_CORE_FT
PRINCIPAL PROCEDURE  Procedure: Left heart cardiac cath  Findings and Treatment: unsuccessful  repair

## 2023-03-29 NOTE — DISCHARGE NOTE NURSING/CASE MANAGEMENT/SOCIAL WORK - NSDCPEFALRISK_GEN_ALL_CORE
For information on Fall & Injury Prevention, visit: [email or web address] OR  [email or web address] OR  https://www.cdc.gov/steadi/patient.html

## 2023-03-29 NOTE — DISCHARGE NOTE PROVIDER - NSDCCPCAREPLAN_GEN_ALL_CORE_FT
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 No driving for 2 days. You may shower 24 hours following the procedure but avoid baths/swimming for 1 week. Check your groin site for bleeding and/or swelling daily following procedure and call your doctor immediately if it occurs or if you experience increased pain at the site. Follow up with your cardiologist in 1-2 weeks. You may call [facility] if you have any questions/concerns regarding your procedure [phone].      SECONDARY DISCHARGE DIAGNOSES  Diagnosis: HTN (hypertension)  Assessment and Plan of Treatment: Continue with your blood pressure medications; eat a heart healthy diet with low salt diet; exercise regularly (consult with your physician or cardiologist first); maintain a heart healthy weight; if you smoke - quit (A resource to help you stop smoking is the [facility] [employer] for CoverMyMeds Control – phone number [phone].); include healthy ways to manage stress. Continue to follow with your primary care physician or cardiologist.    Diagnosis: HLD (hyperlipidemia)  Assessment and Plan of Treatment: Continue with your cholesterol medications. Eat a heart healthy diet that is low in saturated fats and salt, and includes whole grains, fruits, vegetables and lean protein; exercise regularly (consult with your physician or cardiologist first); maintain a heart healthy weight; if you smoke - quit (A resource to help you stop smoking is the [facility] for Tobacco Control – phone number [phone].). Continue to follow with your primary physician or cardiologist.

## 2023-03-30 ENCOUNTER — NON-APPOINTMENT (OUTPATIENT)
Age: 88
End: 2023-03-30

## 2023-04-14 ENCOUNTER — APPOINTMENT (OUTPATIENT)
Dept: CARDIOLOGY | Facility: CLINIC | Age: 88
End: 2023-04-14
Payer: MEDICARE

## 2023-04-14 VITALS
HEIGHT: 65 IN | WEIGHT: 157 LBS | BODY MASS INDEX: 26.16 KG/M2 | DIASTOLIC BLOOD PRESSURE: 65 MMHG | SYSTOLIC BLOOD PRESSURE: 113 MMHG | OXYGEN SATURATION: 94 % | HEART RATE: 62 BPM

## 2023-04-14 PROCEDURE — 99214 OFFICE O/P EST MOD 30 MIN: CPT

## 2023-04-14 NOTE — DISCUSSION/SUMMARY
[FreeTextEntry1] : Dyspnea: Suspect more related to fluid overload. Symptoms worsened post cath (large contrast load) and seem to be worse when lying flat. Responded well to lasix in the past. Would trial lasix 20mg BID x 14 days and assess for symptom resolution. Opening of  did not help relieve symptoms. On amio, recent CT chest with INGRID ground glass opacities \par \par Lasix 20mg BID x 14 days, then back to once daily\par \par CAD s/p PCI: 2006. Now with  of RCA, s/p PCI. On asa monotherapy. ?need for dual antiplatelet agents\par \par Cont asa, simvastatin- consider higher intensity statin \par \par HTN: BP great today, cont meds\par \par NSVT, PVC's: None recently.  Maintained on amiodarone 100mg, Coreg BID. Monitor TFT's, LFT's, PFT's, regular ophtho appointments \par \par ICD: Interrogations at [facility]\par \par Dilated aorta- 5.0 cm ascending aortic aneurysm.. Consider CT Sx eval but can hold off given stability and age. BP control, avoid heavy lifting.  Serial echos/CT scans\par \par RV as scheduled in June \par \par

## 2023-04-14 NOTE — PHYSICAL EXAM

## 2023-04-14 NOTE — HISTORY OF PRESENT ILLNESS
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 He does a lot of work around the house, bikes, without symptoms \par He is maintained on lasix 20mg daily \par CT of the chest from Feb 2023 with INGRID ground glass opacities \par \par He had a TTE done in the hospital which was notable for an EF of 40% with inferior hypokinesis, elevated filling pressures\par \par Last ICD interrogation 2/23 without events or significant ectopy\par Maintained on amio 100mg x 4 years\par Known dilated aorta, 5.0 cm on TTE, (4.8cm in 2014)\par No ICD shocks \par \par -----------------\par \par HISTORY:\par \par Recent ICD check showing 1 VT episode in Feb 2021 terminated via 1 ATP, 22 beats of VT in May 2022 self terminated\par He does have rare episodes of syncope which usually occur with his anxiety. Neuro thought possible seizures, no correlation with arrhythmia on the monitor. \par \par ------------------\par \par Never smoker. 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

## 2023-04-20 ENCOUNTER — RX RENEWAL (OUTPATIENT)
Age: 88
End: 2023-04-20

## 2023-04-25 ENCOUNTER — APPOINTMENT (OUTPATIENT)
Dept: CARDIOLOGY | Facility: CLINIC | Age: 88
End: 2023-04-25
Payer: MEDICARE

## 2023-04-25 VITALS
TEMPERATURE: 98 F | HEART RATE: 55 BPM | WEIGHT: 160 LBS | HEIGHT: 65 IN | OXYGEN SATURATION: 95 % | SYSTOLIC BLOOD PRESSURE: 105 MMHG | DIASTOLIC BLOOD PRESSURE: 60 MMHG | BODY MASS INDEX: 26.66 KG/M2

## 2023-04-25 DIAGNOSIS — R06.00 DYSPNEA, UNSPECIFIED: ICD-10-CM

## 2023-04-25 DIAGNOSIS — R06.89 DYSPNEA, UNSPECIFIED: ICD-10-CM

## 2023-04-25 DIAGNOSIS — R55 SYNCOPE AND COLLAPSE: ICD-10-CM

## 2023-04-25 PROCEDURE — 99214 OFFICE O/P EST MOD 30 MIN: CPT

## 2023-04-25 NOTE — DISCUSSION/SUMMARY
[FreeTextEntry1] : Dyspnea: Likely related to fluid overload. Now improving s/p diuresis\par \par Syncope: Suspect overdiuresis. Agree with holding lasix today. Would hold tomorrow then resume lasix 20mg daily (prior dose). Encourage hydration. Pending device check in 2 weeks. Denies shocks\par \par CAD s/p PCI: 2006. Now with  of RCA, s/p PCI. On asa monotherapy. ?need for dual antiplatelet agents\par \par Cont asa, simvastatin- consider higher intensity statin \par \par HTN: BP running lower. Consider scaling back on Meds\par \par NSVT, PVC's: None recently.  Maintained on amiodarone 100mg, Coreg BID. Monitor TFT's, LFT's, PFT's, regular ophtho appointments. On amio, recent CT chest with INGRID ground glass opacities \par \par ICD: Interrogations at [facility]- pending next month\par \par Dilated aorta- 5.0 cm ascending aortic aneurysm.. Consider CT Sx eval but can hold off given stability and age. BP control, avoid heavy lifting.  Serial echos/CT scans\par \par RV as scheduled in June \par \par

## 2023-04-25 NOTE — PHYSICAL EXAM

## 2023-04-25 NOTE — HISTORY OF PRESENT ILLNESS
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, HFrEF, on amiodarone for PVC's who presents for follow up, syncopal episode\par \par At last visit, pt had been complaining of increasing dyspnea s/p cath\par Suspected fluid overload- lasix increased to 40mg daily \par Dyspnea symptoms have improved\par Pt had a syncopal episode a few days ago while sitting in the kitchen, felt it coming\par Had another episode yesterday\par Wife held lasix today\par Has been very busy around the house, doing yard work, cleaning out the garage\par \par HISTORY:\par \par S/p failed PCI of RCA  - able to go from 100% to 95% with some improved flow but unable to open vessel further. CT of the chest from Feb 2023 with INGRID ground glass opacities \par He had a TTE done in the hospital which was notable for an EF of 40% with inferior hypokinesis, elevated filling pressures\par \par Last ICD interrogation 2/23 without events or significant ectopy\par Maintained on amio 100mg x 4 years\par Known dilated aorta, 5.0 cm on TTE, (4.8cm in 2014)\par No ICD shocks \par \par -----------------\par \par HISTORY:\par \par Recent ICD check showing 1 VT episode in Feb 2021 terminated via 1 ATP, 22 beats of VT in May 2022 self terminated\par He does have rare episodes of syncope which usually occur with his anxiety. Neuro thought possible seizures, no correlation with arrhythmia on the monitor. \par \par ------------------\par \par Never smoker. 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

## 2023-05-12 ENCOUNTER — NON-APPOINTMENT (OUTPATIENT)
Age: 88
End: 2023-05-12

## 2023-05-12 ENCOUNTER — APPOINTMENT (OUTPATIENT)
Dept: ELECTROPHYSIOLOGY | Facility: CLINIC | Age: 88
End: 2023-05-12
Payer: MEDICARE

## 2023-05-12 PROCEDURE — 93296 REM INTERROG EVL PM/IDS: CPT

## 2023-05-12 PROCEDURE — 93295 DEV INTERROG REMOTE 1/2/MLT: CPT

## 2023-06-08 RX ORDER — FUROSEMIDE 20 MG/1
20 TABLET ORAL
Qty: 90 | Refills: 0 | Status: ACTIVE | COMMUNITY
Start: 2023-02-03 | End: 1900-01-01

## 2023-07-16 ENCOUNTER — RX RENEWAL (OUTPATIENT)
Age: 88
End: 2023-07-16

## 2023-07-21 ENCOUNTER — RX RENEWAL (OUTPATIENT)
Age: 88
End: 2023-07-21

## 2023-07-23 ENCOUNTER — NON-APPOINTMENT (OUTPATIENT)
Age: 88
End: 2023-07-23

## 2023-07-23 ENCOUNTER — APPOINTMENT (OUTPATIENT)
Dept: CARDIOLOGY | Facility: CLINIC | Age: 88
End: 2023-07-23
Payer: MEDICARE

## 2023-07-23 VITALS
SYSTOLIC BLOOD PRESSURE: 129 MMHG | DIASTOLIC BLOOD PRESSURE: 68 MMHG | HEART RATE: 60 BPM | HEIGHT: 65 IN | BODY MASS INDEX: 26.66 KG/M2 | OXYGEN SATURATION: 96 % | TEMPERATURE: 98 F | WEIGHT: 160 LBS

## 2023-07-23 DIAGNOSIS — I49.3 VENTRICULAR PREMATURE DEPOLARIZATION: ICD-10-CM

## 2023-07-23 PROCEDURE — 99215 OFFICE O/P EST HI 40 MIN: CPT

## 2023-07-23 PROCEDURE — 93000 ELECTROCARDIOGRAM COMPLETE: CPT

## 2023-07-23 NOTE — PHYSICAL EXAM

## 2023-07-23 NOTE — DISCUSSION/SUMMARY
[FreeTextEntry1] : HFrEF: In past, feeling great overall. Will repeat echo. Stable fluid status\par \par Cont lasix 20mg daily, working well for patient \par Continue coreg, losartan \par \par CAD s/p PCI: 2006. Now with  of RCA, s/p PCI. On asa monotherapy. \par \par Cont asa, simvastatin- consider higher intensity statin \par \par HTN: BP stable, cont meds \par \par NSVT, PVC's: None recently.  Maintained on amiodarone 100mg, Coreg BID. Monitor TFT's, LFT's, PFT's, regular ophtho appointments. On amio, recent CT chest with INGRID ground glass opacities \par \par ICD: Interrogations at [facility]- 1 year 5 months left as of May 2023. \par \par Dilated aorta- 5.0 cm ascending aortic aneurysm.. Consider CT Sx eval but can hold off given stability and age. BP control, avoid heavy lifting.  Serial echos/CT scans\par \par RV 3M \par

## 2023-07-23 NOTE — HISTORY OF PRESENT ILLNESS
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 CT of the chest from Feb 2023 with INGRID ground glass opacities \par He had a TTE done in the hospital which was notable for an EF of 40% with inferior hypokinesis, elevated filling pressures\par \par Last ICD interrogation 2/23 without events or significant ectopy\par Maintained on amio 100mg x 4 years\par Known dilated aorta, 5.0 cm on TTE, (4.8cm in 2014)\par No ICD shocks \par \par Dyspnea secondary to fluid overload. Lasix increased. Also with arthrectomy to 100% RCA, partial relief of obstruction. Increased diuretics which helped relieve symptoms \par \par -----------------\par \par HISTORY:\par \par Recent ICD check showing 1 VT episode in Feb 2021 terminated via 1 ATP, 22 beats of VT in May 2022 self terminated\par He does have rare episodes of syncope which usually occur with his anxiety. Neuro thought possible seizures, no correlation with arrhythmia on the monitor. 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

## 2023-08-07 ENCOUNTER — APPOINTMENT (OUTPATIENT)
Dept: ELECTROPHYSIOLOGY | Facility: CLINIC | Age: 88
End: 2023-08-07

## 2023-08-11 ENCOUNTER — APPOINTMENT (OUTPATIENT)
Dept: ELECTROPHYSIOLOGY | Facility: CLINIC | Age: 88
End: 2023-08-11
Payer: MEDICARE

## 2023-08-11 ENCOUNTER — NON-APPOINTMENT (OUTPATIENT)
Age: 88
End: 2023-08-11

## 2023-08-11 PROCEDURE — 93296 REM INTERROG EVL PM/IDS: CPT

## 2023-08-11 PROCEDURE — 93295 DEV INTERROG REMOTE 1/2/MLT: CPT

## 2023-08-21 ENCOUNTER — RX RENEWAL (OUTPATIENT)
Age: 88
End: 2023-08-21

## 2023-08-28 ENCOUNTER — APPOINTMENT (OUTPATIENT)
Dept: INTERNAL MEDICINE | Facility: CLINIC | Age: 88
End: 2023-08-28

## 2023-09-05 NOTE — PROGRESS NOTE ADULT - PROBLEM/PLAN-2
DISPLAY PLAN FREE TEXT
9

## 2023-10-23 NOTE — ED ADULT NURSE NOTE - FINAL NURSING ELECTRONIC SIGNATURE
04-Feb-2023 17:15 Simponi Counseling:  I discussed with the patient the risks of golimumab including but not limited to myelosuppression, immunosuppression, autoimmune hepatitis, demyelinating diseases, lymphoma, and serious infections.  The patient understands that monitoring is required including a PPD at baseline and must alert us or the primary physician if symptoms of infection or other concerning signs are noted.

## 2023-11-10 ENCOUNTER — APPOINTMENT (OUTPATIENT)
Dept: ELECTROPHYSIOLOGY | Facility: CLINIC | Age: 88
End: 2023-11-10
Payer: MEDICARE

## 2023-11-10 ENCOUNTER — NON-APPOINTMENT (OUTPATIENT)
Age: 88
End: 2023-11-10

## 2023-11-10 PROCEDURE — 93296 REM INTERROG EVL PM/IDS: CPT

## 2023-11-10 PROCEDURE — 93295 DEV INTERROG REMOTE 1/2/MLT: CPT

## 2024-01-01 ENCOUNTER — RX RENEWAL (OUTPATIENT)
Age: 89
End: 2024-01-01

## 2024-01-01 RX ORDER — ESCITALOPRAM OXALATE 20 MG/1
20 TABLET ORAL
Qty: 90 | Refills: 2 | Status: ACTIVE | COMMUNITY
Start: 1900-01-01 | End: 1900-01-01

## 2024-01-08 ENCOUNTER — APPOINTMENT (OUTPATIENT)
Dept: ORTHOPEDIC SURGERY | Facility: CLINIC | Age: 89
End: 2024-01-08
Payer: MEDICARE

## 2024-01-08 VITALS — HEIGHT: 65 IN | BODY MASS INDEX: 26.66 KG/M2 | WEIGHT: 160 LBS

## 2024-01-08 PROCEDURE — 73562 X-RAY EXAM OF KNEE 3: CPT | Mod: LT

## 2024-01-08 PROCEDURE — 99203 OFFICE O/P NEW LOW 30 MIN: CPT | Mod: 57

## 2024-01-08 NOTE — HISTORY OF PRESENT ILLNESS
[Gradual] : gradual [6] : 6 [0] : 0 [Dull/Aching] : dull/aching [Occasional] : occasional [de-identified] : Right TKA 2012 Dr. Guadarrama, left TKA 2016 Codie Montgomery.  Left knee pain s/p mechanical fall 6 weeks ago landed directly on left knee.  Was able to get up on his own without assistance. [] : no [FreeTextEntry5] : 01/08/2023 PAM RUVALCABA is 89 years old Male who present with evaluation of the left knee pt states he had both knees replacement.

## 2024-01-08 NOTE — IMAGING
[Left] : left knee [AP] : anteroposterior [Lateral] : lateral [[place]] : [place] [Fracture] : Fracture [Components well fixed, in good position] : Components well fixed, in good position [de-identified] : Left knee: No effusion.  Inc well healed.  Tenderness prox patella.  Ext lag.  Lig stable.

## 2024-01-08 NOTE — ASSESSMENT
[FreeTextEntry1] : 1/8/24: Left TKA 2016 - mechanical fall 6 weeks ago sustaining left patella fx nondisplaced.  WBAT in alexsandra locked in extension.

## 2024-01-08 NOTE — DISCUSSION/SUMMARY
[de-identified] : The patient was advised of the diagnosis.  The natural history of the pathology was explained in full to the patient in layman's terms. All questions were answered.  The risks and benefits of surgical and non-surgical treatment alternatives were explained in full to the patient.

## 2024-01-23 ENCOUNTER — APPOINTMENT (OUTPATIENT)
Dept: ORTHOPEDIC SURGERY | Facility: CLINIC | Age: 89
End: 2024-01-23
Payer: MEDICARE

## 2024-01-23 VITALS — WEIGHT: 160 LBS | BODY MASS INDEX: 26.66 KG/M2 | HEIGHT: 65 IN

## 2024-01-23 PROCEDURE — 99214 OFFICE O/P EST MOD 30 MIN: CPT

## 2024-01-23 PROCEDURE — 73560 X-RAY EXAM OF KNEE 1 OR 2: CPT | Mod: LT

## 2024-01-23 NOTE — IMAGING
[Left] : left knee [de-identified] : Left knee: No effusion.  Inc well healed.  Tenderness prox patella.  Ext lag.  Lig stable. [The fracture is in acceptable alignment. There is progression in healing seen] : The fracture is in acceptable alignment. There is progression in healing seen

## 2024-01-23 NOTE — DISCUSSION/SUMMARY
[de-identified] : The patient was advised of the diagnosis.  The natural history of the pathology was explained in full to the patient in layman's terms. All questions were answered.  The risks and benefits of surgical and non-surgical treatment alternatives were explained in full to the patient.  Progress note completed by Aarti Smith PA-C.

## 2024-01-23 NOTE — HISTORY OF PRESENT ILLNESS
[1] : 2 [0] : 0 [de-identified] : 1/23/24: 8 weeks s/p fall and left patella fx. Has been using the knee immobilizer as instructed. States he is doing well today  Previous doc:  Right TKA 2012 Dr. Guadarrama, left TKA 2016 Codie Montgomery.  Left knee pain s/p mechanical fall 6 weeks ago landed directly on left knee.  Was able to get up on his own without assistance. [] : no [FreeTextEntry5] : wears alexsandra brace.

## 2024-01-23 NOTE — ASSESSMENT
[FreeTextEntry1] : 1/8/24: Left TKA 2016 - mechanical fall 6 weeks ago sustaining left patella fx nondisplaced.  WBAT in alexsandra locked in extension.  1/23/24: XR look good today. [place] ROM adjusted to 0-30. f/up in 4 weeks and repeat XR

## 2024-02-01 NOTE — PROGRESS NOTE ADULT - PROBLEM/PLAN-5
DISPLAY PLAN FREE TEXT
oral

## 2024-02-05 ENCOUNTER — RX RENEWAL (OUTPATIENT)
Age: 89
End: 2024-02-05

## 2024-02-09 ENCOUNTER — APPOINTMENT (OUTPATIENT)
Dept: ELECTROPHYSIOLOGY | Facility: CLINIC | Age: 89
End: 2024-02-09
Payer: MEDICARE

## 2024-02-09 ENCOUNTER — NON-APPOINTMENT (OUTPATIENT)
Age: 89
End: 2024-02-09

## 2024-02-09 PROCEDURE — 93295 DEV INTERROG REMOTE 1/2/MLT: CPT

## 2024-02-09 PROCEDURE — 93296 REM INTERROG EVL PM/IDS: CPT

## 2024-02-12 NOTE — PATIENT PROFILE ADULT - NSTRANSFEREYEGLASSESPAIRS_GEN_A_NUR
Prior Auth request for Lidoderm 5% patches-     Diagnosis:  acute pain of right shoulder  M25.924        
Submitted PA for lidocaine (LIDODERM) 5 %    Via CMM (Key: [id number]) STATUS: PENDING.    Follow up done daily; if no decision with in three days we will refax.  If another three days goes by with no decision will call the insurance for status.    
The medication was DENIED; DENIAL letter uploaded to MED[employer].    If you want an APPEAL; please note in this encounter what new information you would like to APPEAL with.  Once complete route back to PA POOL.    If this requires a response please respond to the pool ( P MHCX PSC MEDICATION PRE-AUTH).      Thank you please advise patient.    
1 pair

## 2024-02-20 ENCOUNTER — APPOINTMENT (OUTPATIENT)
Dept: ORTHOPEDIC SURGERY | Facility: CLINIC | Age: 89
End: 2024-02-20
Payer: MEDICARE

## 2024-02-20 VITALS — HEIGHT: 65 IN | WEIGHT: 160 LBS | BODY MASS INDEX: 26.66 KG/M2

## 2024-02-20 DIAGNOSIS — Z96.652 PRESENCE OF LEFT ARTIFICIAL KNEE JOINT: ICD-10-CM

## 2024-02-20 DIAGNOSIS — S82.035A NONDISPLACED TRANSVERSE FRACTURE OF LEFT PATELLA, INITIAL ENCOUNTER FOR CLOSED FRACTURE: ICD-10-CM

## 2024-02-20 PROCEDURE — 99213 OFFICE O/P EST LOW 20 MIN: CPT

## 2024-02-20 PROCEDURE — 73562 X-RAY EXAM OF KNEE 3: CPT | Mod: LT

## 2024-02-20 NOTE — ASSESSMENT
[FreeTextEntry1] : 1/8/24: Left TKA 2016 - mechanical fall 6 weeks ago sustaining left patella fx nondisplaced.  WBAT in alexsandra locked in extension. 1/23/24: XR look good today. [place] ROM adjusted to 0-30. f/up in 4 weeks and repeat XR  2/20/24: Can go bend 120. Patella fx is healed. He has no symptoms today. No longer needs the brace.

## 2024-02-20 NOTE — IMAGING
[de-identified] : Left knee: No effusion.  Inc well healed.  Tenderness prox patella.  Ext lag.  Lig stable. [Left] : left knee [The fracture is in acceptable alignment. There is progression in healing seen] : The fracture is in acceptable alignment. There is progression in healing seen [FreeTextEntry9] : Healed fracture

## 2024-02-20 NOTE — HISTORY OF PRESENT ILLNESS
[0] : 0 [de-identified] : 2/20/24: 12 weeks s/p fall and left patella fx. States he does not have much pain. Has been wearing the brace from 0-70  Previous doc:  Right TKA 2012 Dr. Guadarrama, left TKA 2016 Codie Montgomery.  Left knee pain s/p mechanical fall 6 weeks ago landed directly on left knee.  Was able to get up on his own without assistance. 1/23/24: 8 weeks s/p fall and left patella fx. Has been using the knee immobilizer as instructed. States he is doing well today

## 2024-02-20 NOTE — DISCUSSION/SUMMARY
[de-identified] : The patient was advised of the diagnosis.  The natural history of the pathology was explained in full to the patient in layman's terms. All questions were answered.  The risks and benefits of surgical and non-surgical treatment alternatives were explained in full to the patient.  Progress note completed by Aarti Smith PA-C.

## 2024-02-22 ENCOUNTER — RX RENEWAL (OUTPATIENT)
Age: 89
End: 2024-02-22

## 2024-02-22 RX ORDER — TAMSULOSIN HYDROCHLORIDE 0.4 MG/1
0.4 CAPSULE ORAL
Qty: 90 | Refills: 1 | Status: ACTIVE | COMMUNITY
Start: 2024-02-22 | End: 1900-01-01

## 2024-02-29 ENCOUNTER — APPOINTMENT (OUTPATIENT)
Dept: CARDIOLOGY | Facility: CLINIC | Age: 89
End: 2024-02-29
Payer: MEDICARE

## 2024-02-29 VITALS
HEIGHT: 65 IN | BODY MASS INDEX: 26.66 KG/M2 | HEART RATE: 59 BPM | SYSTOLIC BLOOD PRESSURE: 125 MMHG | OXYGEN SATURATION: 97 % | WEIGHT: 160 LBS | DIASTOLIC BLOOD PRESSURE: 72 MMHG

## 2024-02-29 PROCEDURE — G2211 COMPLEX E/M VISIT ADD ON: CPT

## 2024-02-29 PROCEDURE — 99215 OFFICE O/P EST HI 40 MIN: CPT

## 2024-02-29 NOTE — PHYSICAL EXAM
[Well Nourished] : well nourished [No Acute Distress] : no acute distress [Well Developed] : well developed [Normal Venous Pressure] : normal venous pressure [Normal Conjunctiva] : normal conjunctiva [No Carotid Bruit] : no carotid bruit [Normal S1, S2] : normal S1, S2 [No Rub] : no rub [No Murmur] : no murmur [No Gallop] : no gallop [Clear Lung Fields] : clear lung fields [Good Air Entry] : good air entry [No Respiratory Distress] : no respiratory distress  [No Masses/organomegaly] : no masses/organomegaly [Soft] : abdomen soft [Non Tender] : non-tender [No Edema] : no edema [Normal Bowel Sounds] : normal bowel sounds [Normal Gait] : normal gait [No Cyanosis] : no cyanosis [No Clubbing] : no clubbing [No Rash] : no rash [No Varicosities] : no varicosities [No Skin Lesions] : no skin lesions [Moves all extremities] : moves all extremities [No Focal Deficits] : no focal deficits [Normal Speech] : normal speech [Normal memory] : normal memory [Alert and Oriented] : alert and oriented [de-identified] : extrasystoles

## 2024-02-29 NOTE — HISTORY OF PRESENT ILLNESS
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, HFrEF, on amiodarone for PVC's who presents for follow up, syncopal episode  Pt feeling well overall Had a mechanical fall, fractured kneecap (wasn't dx until 3 weeks later), was given a brace and subsequently healed Occasional dyspnea episodes Very active around the house, garage, shed, climbing ladders, has slowed down a bit ICD 2/24 with 9 months until TAYE, rare brief NSVT Maintained on amio 100mg x 4 years Known dilated aorta, 5.0 cm on TTE, (4.8cm in 2014)  ------------------  HISTORY:  ICD check showing 1 VT episode in Feb 2021 terminated via 1 ATP, 22 beats of VT in May 2022 self terminated He does have rare episodes of syncope which usually occur with his anxiety. Neuro thought possible seizures, no correlation with arrhythmia on the monitor.   S/p failed PCI of RCA  - able to go from 100% to 95% with some improved flow but unable to open vessel further. 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

## 2024-02-29 NOTE — DISCUSSION/SUMMARY
[FreeTextEntry1] : HFrEF: In past, feeling great overall. Repeat TTE, stable fluid status  Cont lasix 20mg daily, working well for patient  Continue coreg, losartan   CAD s/p PCI: 2006. Now with  of RCA, s/p PCI. On asa monotherapy.   Cont asa, simvastatin- consider higher intensity statin   HTN: BP stable, cont meds   NSVT, PVC's: None recently.  Maintained on amiodarone 100mg, Coreg BID. Monitor TFT's, LFT's, PFT's, regular ophtho appointments. On amio, recent CT chest (2/2023) with INGRID ground glass opacities   ICD: Interrogations at [facility]- 9 months until TAYE as of 2/2024.   Dilated aorta- 5.0 cm ascending aortic aneurysm. Consider CT Sx eval but can hold off given stability and age. BP control, avoid heavy lifting.  Serial echos/CT scans  RV 3M

## 2024-03-27 ENCOUNTER — LABORATORY RESULT (OUTPATIENT)
Age: 89
End: 2024-03-27

## 2024-03-28 ENCOUNTER — APPOINTMENT (OUTPATIENT)
Dept: INTERNAL MEDICINE | Facility: CLINIC | Age: 89
End: 2024-03-28
Payer: MEDICARE

## 2024-03-28 LAB
25(OH)D3 SERPL-MCNC: 31.2 NG/ML
ALBUMIN SERPL ELPH-MCNC: 4.2 G/DL
ALP BLD-CCNC: 83 U/L
ALT SERPL-CCNC: 12 U/L
ANION GAP SERPL CALC-SCNC: 10 MMOL/L
APPEARANCE: CLEAR
AST SERPL-CCNC: 19 U/L
BASOPHILS # BLD AUTO: 0.05 K/UL
BASOPHILS NFR BLD AUTO: 0.7 %
BILIRUB SERPL-MCNC: 0.6 MG/DL
BILIRUBIN URINE: NEGATIVE
BLOOD URINE: NEGATIVE
BUN SERPL-MCNC: 19 MG/DL
CALCIUM SERPL-MCNC: 9 MG/DL
CHLORIDE SERPL-SCNC: 103 MMOL/L
CHOLEST SERPL-MCNC: 119 MG/DL
CO2 SERPL-SCNC: 26 MMOL/L
COLOR: YELLOW
CREAT SERPL-MCNC: 1.06 MG/DL
EGFR: 67 ML/MIN/1.73M2
EOSINOPHIL # BLD AUTO: 0.14 K/UL
EOSINOPHIL NFR BLD AUTO: 1.8 %
ESTIMATED AVERAGE GLUCOSE: 111 MG/DL
GLUCOSE QUALITATIVE U: NEGATIVE MG/DL
GLUCOSE SERPL-MCNC: 140 MG/DL
HBA1C MFR BLD HPLC: 5.5 %
HCT VFR BLD CALC: 42.2 %
HDLC SERPL-MCNC: 58 MG/DL
HGB BLD-MCNC: 14 G/DL
IMM GRANULOCYTES NFR BLD AUTO: 0.4 %
IRON SATN MFR SERPL: 27 %
IRON SERPL-MCNC: 91 UG/DL
KETONES URINE: NEGATIVE MG/DL
LDLC SERPL CALC-MCNC: 48 MG/DL
LEUKOCYTE ESTERASE URINE: ABNORMAL
LYMPHOCYTES # BLD AUTO: 1.6 K/UL
LYMPHOCYTES NFR BLD AUTO: 20.8 %
MAN DIFF?: NORMAL
MCHC RBC-ENTMCNC: 32.8 PG
MCHC RBC-ENTMCNC: 33.2 GM/DL
MCV RBC AUTO: 98.8 FL
MONOCYTES # BLD AUTO: 0.46 K/UL
MONOCYTES NFR BLD AUTO: 6 %
NEUTROPHILS # BLD AUTO: 5.4 K/UL
NEUTROPHILS NFR BLD AUTO: 70.3 %
NITRITE URINE: NEGATIVE
NONHDLC SERPL-MCNC: 61 MG/DL
PH URINE: 5.5
PLATELET # BLD AUTO: 204 K/UL
POTASSIUM SERPL-SCNC: 4.4 MMOL/L
PROT SERPL-MCNC: 6.6 G/DL
PROTEIN URINE: NEGATIVE MG/DL
PSA FREE FLD-MCNC: 25 %
PSA FREE SERPL-MCNC: 0.2 NG/ML
PSA SERPL-MCNC: 0.79 NG/ML
RBC # BLD: 4.27 M/UL
RBC # FLD: 14.1 %
SODIUM SERPL-SCNC: 140 MMOL/L
SPECIFIC GRAVITY URINE: 1.02
TIBC SERPL-MCNC: 334 UG/DL
TRIGL SERPL-MCNC: 59 MG/DL
TSH SERPL-ACNC: 4.27 UIU/ML
UIBC SERPL-MCNC: 243 UG/DL
UROBILINOGEN URINE: 0.2 MG/DL
VIT B12 SERPL-MCNC: 471 PG/ML
WBC # FLD AUTO: 7.68 K/UL

## 2024-03-28 PROCEDURE — 93306 TTE W/DOPPLER COMPLETE: CPT

## 2024-04-01 ENCOUNTER — APPOINTMENT (OUTPATIENT)
Dept: INTERNAL MEDICINE | Facility: CLINIC | Age: 89
End: 2024-04-01
Payer: MEDICARE

## 2024-04-01 VITALS
HEART RATE: 59 BPM | OXYGEN SATURATION: 96 % | HEIGHT: 72 IN | TEMPERATURE: 98 F | WEIGHT: 161 LBS | BODY MASS INDEX: 21.81 KG/M2 | SYSTOLIC BLOOD PRESSURE: 115 MMHG | DIASTOLIC BLOOD PRESSURE: 69 MMHG

## 2024-04-01 DIAGNOSIS — F41.8 OTHER SPECIFIED ANXIETY DISORDERS: ICD-10-CM

## 2024-04-01 DIAGNOSIS — R79.89 OTHER SPECIFIED ABNORMAL FINDINGS OF BLOOD CHEMISTRY: ICD-10-CM

## 2024-04-01 DIAGNOSIS — Z00.00 ENCOUNTER FOR GENERAL ADULT MEDICAL EXAMINATION W/OUT ABNORMAL FINDINGS: ICD-10-CM

## 2024-04-01 DIAGNOSIS — I25.10 ATHEROSCLEROTIC HEART DISEASE OF NATIVE CORONARY ARTERY W/OUT ANGINA PECTORIS: ICD-10-CM

## 2024-04-01 DIAGNOSIS — L82.1 OTHER SEBORRHEIC KERATOSIS: ICD-10-CM

## 2024-04-01 DIAGNOSIS — I10 ESSENTIAL (PRIMARY) HYPERTENSION: ICD-10-CM

## 2024-04-01 PROCEDURE — G0439: CPT

## 2024-04-01 RX ORDER — TRAZODONE HYDROCHLORIDE 50 MG/1
50 TABLET ORAL
Qty: 90 | Refills: 1 | Status: DISCONTINUED | COMMUNITY
Start: 2022-08-18 | End: 2024-04-01

## 2024-04-01 RX ORDER — MIRTAZAPINE 15 MG/1
15 TABLET, FILM COATED ORAL
Refills: 0 | Status: ACTIVE | COMMUNITY

## 2024-04-01 NOTE — REVIEW OF SYSTEMS
[TextEntry] : Constitutional: Denies fever, fatigue,  recent changes in the weight Head: Denies headache,  c/o dizziness on/off Eyes: Denies diplopia, tearing or pain Ears: Denies earache, tinnitus, c/o decrease  hearing  Nose: Denies nasal obstruction,  epistaxis Throat: Denies throat pain Neck: Denies stiffness, muscle tenderness Chest: Denies cough, SOB, wheezing, chest congestion CV: Denies chest pain,  c/o palpitation on/off GI: Denies abdominal pain, constipation, heartburn Genitourinary: Denies dysuria, urinary urgency Musculoskeletal: Denies joint pain Neuro: Denies changes in mental status Psychiatric: Denies depressive symptoms, changes in thought content, c/o insomnia

## 2024-04-01 NOTE — PHYSICAL EXAM
[TextEntry] : Constitutional: Well  developed, well appearing, not in acute distress Head: Normocephalic, no lesions Eyes: PERRLA, conjunctiva is NL, clear Ear: Ear canal is normal, tympanic membrane is intact Nose: Nasal turbinates are NL Throat: Clear, no exudates, no lesions Neck: Supple, no masses Chest: Lungs are clear, no rales, no rhonchi, no wheezing Heart: Regular rate, no murmurs, no rubs, no gallops Abdomen: Soft, no tenderness, no masses, bowel sounds are normal : No CVAT Extremeties: FROM, no deformities, no edema Muskuloskeletal: has no tenderness of the spine, ROM is NL Skin: Has an age related skin changes. Has a seborrheic keratotic lesion on the back, chest. frontal lobe, face Neuro: AAO x 3, no focal neurological deficit. Psychiatric: oriented to person, place, and time and insight and judgment were intact.

## 2024-04-01 NOTE — HEALTH RISK ASSESSMENT
[Good] : ~his/her~  mood as  good [No] : In the past 12 months have you used drugs other than those required for medical reasons? No [Any fall with injury in past year] : Patient reported fall with injury in the past year [Little interest or pleasure doing things] : 1) Little interest or pleasure doing things [Feeling down, depressed, or hopeless] : 2) Feeling down, depressed, or hopeless [0] : 2) Feeling down, depressed, or hopeless: Not at all (0) [PHQ-2 Negative - No further assessment needed] : PHQ-2 Negative - No further assessment needed [HIV test declined] : HIV test declined [Hepatitis C test declined] : Hepatitis C test declined [None] : None [# of Members in Household ___] :  household currently consist of [unfilled] member(s) [Retired] : retired [High School] : high school [] :  [Feels Safe at Home] : Feels safe at home [Fully functional (using the telephone, shopping, preparing meals, housekeeping, doing laundry, using] : Fully functional and needs no help or supervision to perform IADLs (using the telephone, shopping, preparing meals, housekeeping, doing laundry, using transportation, managing medications and managing finances) [Fully functional (bathing, dressing, toileting, transferring, walking, feeding)] : Fully functional (bathing, dressing, toileting, transferring, walking, feeding) [Reports changes in vision] : Reports changes in vision [Reports changes in hearing] : Reports changes in hearing [Smoke Detector] : smoke detector [Carbon Monoxide Detector] : carbon monoxide detector [Seat Belt] :  uses seat belt [With Patient/Caregiver] : , with patient/caregiver [Designated Healthcare Proxy] : Designated healthcare proxy [Name: ___] : Health Care Proxy's Name: [unfilled]  [Relationship: ___] : Relationship: [unfilled] [DNR] : DNR [DNI] : DNI [I will adhere to the patient's wishes.] : I will adhere to the patient's wishes. [Time Spent: ___ minutes] : Time Spent: [unfilled] minutes [Never] : Never [de-identified] : cardiologist, electrophysiologist, ophtalmologist [Audit-CScore] : 0 [de-identified] : walking [de-identified] : regular [de-identified] : 12/2023 s s/p fall and left patella fx. [[id number]] : 0 [Change in mental status noted] : No change in mental status noted [Learning/Retaining New Information] : denies difficulty learning/retaining new information [Language] : denies difficulty with language [Handling Complex Tasks] : denies difficulty handling complex tasks [Reports changes in dental health] : Reports no changes in dental health [Guns at Home] : no guns at home [Sunscreen] : does not use sunscreen [de-identified] : with wife [ColonoscopyDate] : never did a colonoscopy [FreeTextEntry2] : worked as a  [de-identified] : decrease hearing [de-identified] : wear reading glasses [AdvancecareDate] : 04/01/2024 [FreeTextEntry4] : Cathi # [phone]

## 2024-04-01 NOTE — COUNSELING
[Adequate lighting] : Adequate lighting [No throw rugs] : No throw rugs [Fall prevention counseling provided] : Fall prevention counseling provided [Use proper foot wear] : Use proper foot wear

## 2024-04-01 NOTE — PLAN
[FreeTextEntry1] : Mr. 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  Result of the blood test from 03/27/24  discussed with the patient in detail.  Hyperlipidemia is well controlled continue - zocor TSH is elevated, recommend to repeat blood test in 2 mo BPH - tamsulosin, finasteride Depression/anxiety - clonazepam, lexapro CAD, s/p PCI and ICD - amiodarone, ASA, losartan. F/u with a cardiologist For insomnia continue mirtazapine Seborrheic keratosis f/u with dermatologist  RTC to f/u in 2 wks. Patient is verbalized understanding

## 2024-04-04 ENCOUNTER — RX RENEWAL (OUTPATIENT)
Age: 89
End: 2024-04-04

## 2024-04-04 RX ORDER — CARVEDILOL 6.25 MG/1
6.25 TABLET, FILM COATED ORAL
Qty: 180 | Refills: 3 | Status: ACTIVE | COMMUNITY
Start: 2020-03-09 | End: 1900-01-01

## 2024-04-05 NOTE — ED ADULT NURSE NOTE - CHPI ED NUR TIMING2
Patient requires a rolling walker at home to help complete their ADLs due to their dx of seizures, DMT2, HTN, HLD, CAD, HF, deconditioning.     Blanca Santamaria PA-C  Department of Medicine   In-house pager #[phone] gradual onset

## 2024-05-09 ENCOUNTER — NON-APPOINTMENT (OUTPATIENT)
Age: 89
End: 2024-05-09

## 2024-05-10 ENCOUNTER — APPOINTMENT (OUTPATIENT)
Dept: ELECTROPHYSIOLOGY | Facility: CLINIC | Age: 89
End: 2024-05-10
Payer: MEDICARE

## 2024-05-10 PROCEDURE — 93295 DEV INTERROG REMOTE 1/2/MLT: CPT

## 2024-05-10 PROCEDURE — 93296 REM INTERROG EVL PM/IDS: CPT

## 2024-05-17 ENCOUNTER — RX RENEWAL (OUTPATIENT)
Age: 89
End: 2024-05-17

## 2024-05-17 RX ORDER — AMIODARONE HYDROCHLORIDE 200 MG/1
200 TABLET ORAL
Qty: 45 | Refills: 1 | Status: ACTIVE | COMMUNITY
Start: 2023-07-16 | End: 1900-01-01

## 2024-05-30 ENCOUNTER — NON-APPOINTMENT (OUTPATIENT)
Age: 89
End: 2024-05-30

## 2024-05-30 ENCOUNTER — APPOINTMENT (OUTPATIENT)
Dept: CARDIOLOGY | Facility: CLINIC | Age: 89
End: 2024-05-30
Payer: MEDICARE

## 2024-05-30 VITALS
WEIGHT: 162 LBS | OXYGEN SATURATION: 96 % | HEIGHT: 72 IN | DIASTOLIC BLOOD PRESSURE: 62 MMHG | BODY MASS INDEX: 21.94 KG/M2 | SYSTOLIC BLOOD PRESSURE: 100 MMHG | HEART RATE: 61 BPM

## 2024-05-30 DIAGNOSIS — Z79.899 OTHER LONG TERM (CURRENT) DRUG THERAPY: ICD-10-CM

## 2024-05-30 DIAGNOSIS — I25.10 ATHEROSCLEROTIC HEART DISEASE OF NATIVE CORONARY ARTERY W/OUT ANGINA PECTORIS: ICD-10-CM

## 2024-05-30 DIAGNOSIS — I50.20 UNSPECIFIED SYSTOLIC (CONGESTIVE) HEART FAILURE: ICD-10-CM

## 2024-05-30 DIAGNOSIS — Z98.61 ATHEROSCLEROTIC HEART DISEASE OF NATIVE CORONARY ARTERY W/OUT ANGINA PECTORIS: ICD-10-CM

## 2024-05-30 DIAGNOSIS — I77.810 THORACIC AORTIC ECTASIA: ICD-10-CM

## 2024-05-30 PROCEDURE — G2211 COMPLEX E/M VISIT ADD ON: CPT

## 2024-05-30 PROCEDURE — 99214 OFFICE O/P EST MOD 30 MIN: CPT

## 2024-05-30 PROCEDURE — 93000 ELECTROCARDIOGRAM COMPLETE: CPT

## 2024-05-30 NOTE — HISTORY OF PRESENT ILLNESS
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 Neuro thought possible seizures, no correlation with arrhythmia on the monitor.   S/p failed PCI of RCA  - able to go from 100% to 95% with some improved flow but unable to open vessel further. CT of the chest from Feb 2023 with INGRID ground glass opacities  He had a TTE done in the hospital which was notable for an EF of 40% with inferior hypokinesis, elevated filling pressures  ------------------  Never smoker. 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 Left ventricular cavity is normal in size. Left ventricular wall thickness is moderately increased. Left ventricular systolic function is moderately decreased with an ejection fraction of 39 % by Fowler's method of disks. Regional wall motion abnormalities present. 2. There is hypokinesis of the basal inferior, basal inferoseptal and basal inferolateral walls. 3. There is increased LV mass and concentric hypertrophy. 4. There is mild (grade 1) left ventricular diastolic dysfunction, with elevated filling pressure. 5. Normal right ventricular cavity size, with normal wall thickness, and normal systolic function. Tricuspid annular plane systolic excursion (TAPSE) is 2.7 cm (normal >=1.7 cm). 6. The left atrium is moderately dilated. 7. Mild mitral regurgitation. 8. Mild to moderate aortic regurgitation. 9. Aortic root at the sinuses of Valsalva is dilated, measuring 4.70 cm (indexed 2.61 cm/m). Ascending aorta diameter is dilated, measuring 4.90 cm (indexed 2.72 cm/m). 10. Compared to the transthoracic echocardiogram performed on 2/3/2023, there have been no significant interval changes.  Cath 2023: 100% RCA , s/p atherectomy with reduction to 95%  Asa 81mg Simvastatin 20mg  Losartan 25mg daily Coreg 6.25mg BID Amiodarone 100mg daily  Lasix 20mg daily

## 2024-05-30 NOTE — DISCUSSION/SUMMARY
[FreeTextEntry1] : HFrEF: In past, feeling great overall. Stable on last TTE. EF 39%  Cont lasix 20mg daily, working well for patient  Continue coreg, losartan   CAD s/p PCI: 2006. Now with  of RCA, s/p PCI. On asa monotherapy.   Cont asa, simvastatin- consider higher intensity statin   HTN: BP stable, cont meds   NSVT, PVC's: None recently.  Maintained on amiodarone 100mg, Coreg BID. Monitor TFT's, LFT's, PFT's, regular ophtho appointments. On amio, recent CTPA unremakrable   ICD: Interrogations at [facility]- 6 months until TAYE as of 5/2024.   Dilated aorta- 5.1 cm ascending aortic aneurysm. Consider CT Sx eval but can hold off given stability and age. BP control, avoid heavy lifting.  Serial echos/CT scans  RV 3M   [EKG obtained to assist in diagnosis and management of assessed problem(s)] : EKG obtained to assist in diagnosis and management of assessed problem(s)

## 2024-05-30 NOTE — PHYSICAL EXAM

## 2024-06-02 NOTE — DISCHARGE NOTE NURSING/CASE MANAGEMENT/SOCIAL WORK - NSTOBACCONEVERSMOKERY/N_GEN_A
ED Attending Physician Note      Patient : Alec Hoff Age: 13 year old Sex: female   MRN: [id number] Encounter Date: 6/2/2024    History     Chief Complaint   Patient presents with    Abdominal Pain       HPI    Pt is a 13 year old female with pmh of asthma who presents with abdominal pain. Abdominal pain for the past 4 days. Abdominal pain in different areas of the abdomen. Last BM 2 days ago. Prior to that BM was 3 days ago. Occasionally strains with stooling. Brown nonbloody stool. Reportedly sometimes when pt wipes the stool appears mucousy. No vaginal discharge.     No recent travel.     LMP 2 weeks ago.     No fever  No cough, rhinorrhea  No vomiting, diarrhea  No rashes    Appetite normal  Fluid intake normal  Urine output normal, no dysuria    Sibling also with abdominal pain.   Immunizations up to date      No Known Allergies    Current Discharge Medication List        Prior to Admission Medications    Details   albuterol (ACCUNEB) 0.63 MG/3ML nebulizer solution Take 0.63 mg by nebulization every 6 hours as needed for Wheezing.      amoxicillin (AMOXIL) 400 MG/5ML suspension Take 12ml po bid x 10 days  Qty: 240 mL, Refills: 0      guaifenesin (ROBITUSSIN) 100 MG/5ML liquid Take 10 mLs by mouth 3 times daily as needed for Cough.  Qty: 100 mL, Refills: 0             Current Discharge Medication List          History reviewed. No pertinent past medical history.    Past Surgical History:   Procedure Laterality Date    No past surgeries         History reviewed. No pertinent family history.         Review of Systems     Constitutional - Reviewed and are neg except as above  Eyes - Reviewed and are neg except as above  ENT - Reviewed and are neg except as above  Cardiovascular - Reviewed and are neg except as above  Respiratory - Reviewed and are neg except as above  GI - Reviewed and are neg except as above   - Reviewed and are neg except as above  MS - Reviewed and are neg except as  above  Skin - Reviewed and are neg except as above  Neuro - Reviewed and are neg except as above  Heme/Lymph - Reviewed and are neg except as above    Physical Exam     Vitals:    06/02/24 1809   BP: 112/74   Pulse: 89   Resp: 18   Temp: 98.5 °F (36.9 °C)   TempSrc: Oral   SpO2: 100%       GENERAL: Alert in no acute distress  HEENT: Atraumatic, PERRLA, EOMI, no posterior pharyngeal erythema or exudates, TMs clear bilaterally  NECK: Supple, no prominent cervical lymphadenopathy  LUNGS: CTA bilaterally with no wheezing, no crackles, no retractions, no signs of respiratory distress  HEART: RRR, +S1S2, no murmur appreciated  GI: normoactive bowel sounds, very minimal discomfort with palpation in the periumbilical/left upper quadrant.  No obvious splenic tenderness.  No right lower quadrant tenderness to palpation.  No lower abdominal tenderness to palpation.  No masses palpated.  Non-distended, no rebound or guarding, no hepatosplenomegaly. (Resident performed external rectal exam and no reported fissures or discharge or abnormalities)  EXT: No cyanosis or edema. No joint swelling or deformity  SKIN: Warm and well perfused. No rashes or bruising  NEURO: No obvious focal neuro deficits, mental status appropriate        Medical Decision Making                  Pt is a 13 year old female with pmh of asthma who presents with abdominal pain. Abdominal pain for the past 4 days. Abdominal pain in different areas of the abdomen. Last BM 2 days ago. Prior to that BM was 3 days ago. Occasionally strains with stooling. Brown nonbloody stool. Reportedly sometimes when pt wipes the stool appears mucousy. No vaginal discharge. On exam patient is very well-appearing.  Nontoxic.  Appears well-hydrated.  In no distress.  Abdominal exam is overall reassuring.  Patient has minimal discomfort palpation in the periumbilical/slightly in the left upper quadrant of the abdomen.  No epigastric or right upper quadrant tenderness.  No right lower  quadrant tenderness.  No other significant findings on my exam.  Symptoms seem consistent with constipation as patient has only had 2 bowel movements over the last week.  No vomiting.  No fevers.  Mother concerned about a parasite but no recent travel and history does not seem consistent with a parasitic infection.  Low suspicion for appendicitis.  Ovarian pathology less likely.  Given patient's age and history I have low suspicion for intussusception.  After discussion with mother we will plan to discharge patient home with instructions to start MiraLAX.  Return instructions given.  Patient stable for discharge.        Guardian and or patient verbalized understanding      MDM done in ED Course and as above.        ED Course              Procedures     Procedures    Lab Results     No results found for this visit on 06/02/24.      Radiology Results     Imaging Results    None         ED Medications     ED Medication Orders (From admission, onward)      None            Critical Care       No Critical Care        Clinical Impression     ED Diagnosis   1. Abdominal pain, unspecified abdominal location        2. Constipation, unspecified constipation type              Discussed with family regarding patient's diagnosis and all results. All questions answered.    I personally reviewed pertinent: hospital documentation, lab results, imaging studies    Disposition     Discharge 6/2/2024  7:12 PM  Alec Hoff discharge to home/self care.          Follow Up if discharged from the ED:   Primary Care Physician    Schedule an appointment as soon as possible for a visit in 2 days  Please call your child's pediatrician to make an appointment as an outpatient so your child can be reevaluated.      New Prescriptions    No medications on file               Teaching-Supervisory Addendum:  I participated in the following activities of this patient care: The medical history, the physical exam, medical decision-making, the  procedure(s).    I personally performed: Supervision of the patient's care, the medical history, the physical exam, the medical decision making.    I personally reviewed pertinent: hospital documentation, lab results, imaging studies    The case was discussed with: The resident (or NP)    Procedures: I directly supervised any procedure(s) noted by resident (or NP)    Evaluation and management service: I agree with the evaluation and management decisions made in this patient's care.             David Hanna MD  06/02/24 3937     No

## 2024-06-04 ENCOUNTER — RX RENEWAL (OUTPATIENT)
Age: 89
End: 2024-06-04

## 2024-06-04 RX ORDER — SIMVASTATIN 20 MG/1
20 TABLET, FILM COATED ORAL
Qty: 90 | Refills: 1 | Status: ACTIVE | COMMUNITY
Start: 2023-08-21 | End: 1900-01-01

## 2024-06-22 ENCOUNTER — RX RENEWAL (OUTPATIENT)
Age: 89
End: 2024-06-22

## 2024-06-22 RX ORDER — LOSARTAN POTASSIUM 25 MG/1
25 TABLET, FILM COATED ORAL
Qty: 180 | Refills: 3 | Status: ACTIVE | COMMUNITY
Start: 2023-04-20 | End: 1900-01-01

## 2024-07-09 ENCOUNTER — RX RENEWAL (OUTPATIENT)
Age: 89
End: 2024-07-09

## 2024-07-22 ENCOUNTER — RX RENEWAL (OUTPATIENT)
Age: 89
End: 2024-07-22

## 2024-08-08 ENCOUNTER — NON-APPOINTMENT (OUTPATIENT)
Age: 89
End: 2024-08-08

## 2024-08-09 ENCOUNTER — APPOINTMENT (OUTPATIENT)
Dept: ELECTROPHYSIOLOGY | Facility: CLINIC | Age: 89
End: 2024-08-09

## 2024-08-09 PROCEDURE — 93295 DEV INTERROG REMOTE 1/2/MLT: CPT

## 2024-08-09 PROCEDURE — 93296 REM INTERROG EVL PM/IDS: CPT

## 2024-09-03 ENCOUNTER — APPOINTMENT (OUTPATIENT)
Dept: CARDIOLOGY | Facility: CLINIC | Age: 89
End: 2024-09-03
Payer: MEDICARE

## 2024-09-03 VITALS
DIASTOLIC BLOOD PRESSURE: 60 MMHG | HEART RATE: 61 BPM | WEIGHT: 162 LBS | HEIGHT: 72 IN | BODY MASS INDEX: 21.94 KG/M2 | SYSTOLIC BLOOD PRESSURE: 92 MMHG | OXYGEN SATURATION: 95 %

## 2024-09-03 DIAGNOSIS — I50.20 UNSPECIFIED SYSTOLIC (CONGESTIVE) HEART FAILURE: ICD-10-CM

## 2024-09-03 DIAGNOSIS — Z79.899 OTHER LONG TERM (CURRENT) DRUG THERAPY: ICD-10-CM

## 2024-09-03 DIAGNOSIS — Z98.61 ATHEROSCLEROTIC HEART DISEASE OF NATIVE CORONARY ARTERY W/OUT ANGINA PECTORIS: ICD-10-CM

## 2024-09-03 DIAGNOSIS — I77.810 THORACIC AORTIC ECTASIA: ICD-10-CM

## 2024-09-03 DIAGNOSIS — I25.10 ATHEROSCLEROTIC HEART DISEASE OF NATIVE CORONARY ARTERY W/OUT ANGINA PECTORIS: ICD-10-CM

## 2024-09-03 PROCEDURE — 99215 OFFICE O/P EST HI 40 MIN: CPT

## 2024-09-03 PROCEDURE — G2211 COMPLEX E/M VISIT ADD ON: CPT

## 2024-09-03 NOTE — HISTORY OF PRESENT ILLNESS
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 Remains very active  ICD 5/2024 with 6 months until TAYE, no events Maintained on amio 100mg x 4 years Known dilated aorta, 5.1 cm on recent CTPA, (4.8cm in 2014)  ------------------  HISTORY:  ICD check showing 1 VT episode in Feb 2021 terminated via 1 ATP, 22 beats of VT in May 2022 self terminated He does have rare episodes of syncope which usually occur with his anxiety. Neuro thought possible seizures, no correlation with arrhythmia on the monitor.   S/p failed PCI of RCA  - able to go from 100% to 95% with some improved flow but unable to open vessel further. CT of the chest from Feb 2023 with INGRID ground glass opacities  He had a TTE done in the hospital which was notable for an EF of 40% with inferior hypokinesis, elevated filling pressures  June 2024 - Could not get out of bed, had to call fire dept, went to  ER, had CTPA notable for 5.1 cm ascending aorta  ------------------  Never smoker. 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 Left ventricular cavity is normal in size. Left ventricular wall thickness is moderately increased. Left ventricular systolic function is moderately decreased with an ejection fraction of 39 % by Fowler's method of disks. Regional wall motion abnormalities present. 2. There is hypokinesis of the basal inferior, basal inferoseptal and basal inferolateral walls. 3. There is increased LV mass and concentric hypertrophy. 4. There is mild (grade 1) left ventricular diastolic dysfunction, with elevated filling pressure. 5. Normal right ventricular cavity size, with normal wall thickness, and normal systolic function. Tricuspid annular plane systolic excursion (TAPSE) is 2.7 cm (normal >=1.7 cm). 6. The left atrium is moderately dilated. 7. Mild mitral regurgitation. 8. Mild to moderate aortic regurgitation. 9. Aortic root at the sinuses of Valsalva is dilated, measuring 4.70 cm (indexed 2.61 cm/m). Ascending aorta diameter is dilated, measuring 4.90 cm (indexed 2.72 cm/m). 10. Compared to the transthoracic echocardiogram performed on 2/3/2023, there have been no significant interval changes.  Cath 2023: 100% RCA , s/p atherectomy with reduction to 95%  Asa 81mg Simvastatin 20mg  Amiodarone 100mg daily   Losartan 25mg BID  Coreg 6.25mg BID Lasix 20mg daily

## 2024-09-03 NOTE — DISCUSSION/SUMMARY
[FreeTextEntry1] : HFrEF: In past, feeling great overall. Stable on last TTE. EF 39%  Cont lasix 20mg daily, working well for patient  Continue coreg, losartan  BP running low but no symptoms  CAD s/p PCI: 2006. Now with  of RCA, s/p PCI. On asa monotherapy.   Cont asa, simvastatin, LDL 48  HTN: BP borderline low, without symptoms. Cont meds, monitor closely  NSVT, PVC's: None recently.  Maintained on amiodarone 100mg, Coreg BID. Monitor TFT's, LFT's, PFT's, regular ophtho appointments. On amio, recent CTPA unremakrable   ICD: Interrogations at [facility]- 4 months until TAYE as of 8/2024. Seeing EP in early November  Dilated aorta: 5.1 cm ascending aortic aneurysm. Consider CT Sx eval but can hold off given stability and age. BP control, avoid heavy lifting.  Serial echos/CT scans  RV 3M

## 2024-09-03 NOTE — PHYSICAL EXAM

## 2024-10-15 ENCOUNTER — RX RENEWAL (OUTPATIENT)
Age: 89
End: 2024-10-15

## 2024-10-30 ENCOUNTER — RX RENEWAL (OUTPATIENT)
Age: 89
End: 2024-10-30

## 2024-11-08 ENCOUNTER — NON-APPOINTMENT (OUTPATIENT)
Age: 89
End: 2024-11-08

## 2024-11-08 ENCOUNTER — APPOINTMENT (OUTPATIENT)
Dept: ELECTROPHYSIOLOGY | Facility: CLINIC | Age: 89
End: 2024-11-08
Payer: MEDICARE

## 2024-11-08 PROCEDURE — 93295 DEV INTERROG REMOTE 1/2/MLT: CPT

## 2024-11-08 PROCEDURE — 93296 REM INTERROG EVL PM/IDS: CPT

## 2024-11-18 ENCOUNTER — APPOINTMENT (OUTPATIENT)
Dept: CARDIOLOGY | Facility: CLINIC | Age: 89
End: 2024-11-18
Payer: MEDICARE

## 2024-11-18 VITALS — DIASTOLIC BLOOD PRESSURE: 64 MMHG | HEART RATE: 60 BPM | SYSTOLIC BLOOD PRESSURE: 120 MMHG

## 2024-11-18 DIAGNOSIS — Z98.61 ATHEROSCLEROTIC HEART DISEASE OF NATIVE CORONARY ARTERY W/OUT ANGINA PECTORIS: ICD-10-CM

## 2024-11-18 DIAGNOSIS — I49.3 VENTRICULAR PREMATURE DEPOLARIZATION: ICD-10-CM

## 2024-11-18 DIAGNOSIS — I50.20 UNSPECIFIED SYSTOLIC (CONGESTIVE) HEART FAILURE: ICD-10-CM

## 2024-11-18 DIAGNOSIS — I77.810 THORACIC AORTIC ECTASIA: ICD-10-CM

## 2024-11-18 DIAGNOSIS — I25.10 ATHEROSCLEROTIC HEART DISEASE OF NATIVE CORONARY ARTERY W/OUT ANGINA PECTORIS: ICD-10-CM

## 2024-11-18 PROCEDURE — G2211 COMPLEX E/M VISIT ADD ON: CPT

## 2024-11-18 PROCEDURE — 99215 OFFICE O/P EST HI 40 MIN: CPT

## 2024-12-04 ENCOUNTER — RX RENEWAL (OUTPATIENT)
Age: 88
End: 2024-12-04

## 2024-12-04 NOTE — ED ADULT TRIAGE NOTE - BP NONINVASIVE SYSTOLIC (MM HG)
12/4/2024      Patricia Perez  [address] [address]      Dear Colleague,    Thank you for referring your patient, Patricia Perez, to the [facility]. Please see a copy of my visit note below.    HISTORY OF PRESENT ILLNESS    Patricia Perez is a 66 year old female seen for follow up of left  knee osteoarthritis, lateral meniscus tear, loose body   She got an MRI in October because the knee was worse.  After the MRI the knee got better again.   After walking down stairs 11/15, her knee got worse again.   She has grade     Feels like something loose in the knee  Pain anterior   Pain levels fluctuate  No catching, locking or giving-way.     Has been limiting activities.       KNEE EXAM:   Mild effusion  Diffuse tenderness.  Alignment: unchanged        Impression:   Osteoarthritis left knee. Mainly patellofemoral joint, but significant changes in the lateral compartment   Loose body left knee. Somewhat symptomatic presently  Lateral meniscus tear -- Somewhat symptomatic     Plan:  after careful consideration, she decided not to proceed with arthroscopy, rather would like to try a corticosteroid injection. Viscosupplementation injection an option as well.   She will eventually need total knee arthroplasty, and this was discussed as an option as well, that she is not prepared for.    With the patient's consent, left knee(s) injected intra-articularly with 80mg of Depomedrol and 4cc of local anesthetic after sterile prep.     Return to clinic prn    X-rays to be done next visit?: no     TING Morris MD  Dept. Orthopedic Surgery  [facility]    Large Joint Injection/Arthocentesis: L knee joint    Date/Time: 12/4/2024 1:57 PM    Performed by: Raymon Vogt  Authorized by: Kelvin Morris MD    Indications:  Pain and osteoarthritis  Needle Size:  22 G  Guidance: landmark guided    Approach:  Anterolateral  Location:  Knee      Medications:  80 mg  methylPREDNISolone 80 MG/ML; 4 mL lidocaine (PF) 1 %  Outcome:  Tolerated well, no immediate complications  Procedure discussed: discussed risks, benefits, and alternatives    Consent Given by:  Patient  Timeout: timeout called immediately prior to procedure    Prep: patient was prepped and draped in usual sterile fashion          Again, thank you for allowing me to participate in the care of your patient.        Sincerely,        Kelvin Morris MD   [phone]

## 2024-12-31 ENCOUNTER — APPOINTMENT (OUTPATIENT)
Dept: ORTHOPEDIC SURGERY | Facility: CLINIC | Age: 88
End: 2024-12-31
Payer: MEDICARE

## 2024-12-31 ENCOUNTER — RESULT CHARGE (OUTPATIENT)
Age: 88
End: 2024-12-31

## 2024-12-31 VITALS — WEIGHT: 161 LBS | HEIGHT: 72 IN | BODY MASS INDEX: 21.81 KG/M2

## 2024-12-31 DIAGNOSIS — Z96.652 PRESENCE OF LEFT ARTIFICIAL KNEE JOINT: ICD-10-CM

## 2024-12-31 PROCEDURE — 99213 OFFICE O/P EST LOW 20 MIN: CPT

## 2024-12-31 PROCEDURE — 73562 X-RAY EXAM OF KNEE 3: CPT | Mod: LT

## 2025-01-06 ENCOUNTER — LABORATORY RESULT (OUTPATIENT)
Age: 89
End: 2025-01-06

## 2025-01-09 ENCOUNTER — NON-APPOINTMENT (OUTPATIENT)
Age: 89
End: 2025-01-09

## 2025-01-09 ENCOUNTER — APPOINTMENT (OUTPATIENT)
Dept: INTERNAL MEDICINE | Facility: CLINIC | Age: 89
End: 2025-01-09
Payer: MEDICARE

## 2025-01-09 VITALS
SYSTOLIC BLOOD PRESSURE: 110 MMHG | HEIGHT: 72 IN | WEIGHT: 161 LBS | BODY MASS INDEX: 21.81 KG/M2 | DIASTOLIC BLOOD PRESSURE: 62 MMHG | OXYGEN SATURATION: 96 % | HEART RATE: 59 BPM

## 2025-01-09 DIAGNOSIS — I49.3 VENTRICULAR PREMATURE DEPOLARIZATION: ICD-10-CM

## 2025-01-09 DIAGNOSIS — I25.10 ATHEROSCLEROTIC HEART DISEASE OF NATIVE CORONARY ARTERY W/OUT ANGINA PECTORIS: ICD-10-CM

## 2025-01-09 DIAGNOSIS — N40.0 BENIGN PROSTATIC HYPERPLASIA WITHOUT LOWER URINARY TRACT SYMPMS: ICD-10-CM

## 2025-01-09 DIAGNOSIS — I77.810 THORACIC AORTIC ECTASIA: ICD-10-CM

## 2025-01-09 DIAGNOSIS — I10 ESSENTIAL (PRIMARY) HYPERTENSION: ICD-10-CM

## 2025-01-09 DIAGNOSIS — Z98.61 ATHEROSCLEROTIC HEART DISEASE OF NATIVE CORONARY ARTERY W/OUT ANGINA PECTORIS: ICD-10-CM

## 2025-01-09 DIAGNOSIS — E78.00 PURE HYPERCHOLESTEROLEMIA, UNSPECIFIED: ICD-10-CM

## 2025-01-09 LAB
ALBUMIN SERPL ELPH-MCNC: 4 G/DL
ALP BLD-CCNC: 86 U/L
ALT SERPL-CCNC: 8 U/L
ANION GAP SERPL CALC-SCNC: 10 MMOL/L
APPEARANCE: CLEAR
AST SERPL-CCNC: 17 U/L
BILIRUB SERPL-MCNC: 0.6 MG/DL
BILIRUBIN URINE: NEGATIVE
BLOOD URINE: NEGATIVE
BUN SERPL-MCNC: 21 MG/DL
CALCIUM SERPL-MCNC: 9.4 MG/DL
CHLORIDE SERPL-SCNC: 104 MMOL/L
CHOLEST SERPL-MCNC: 123 MG/DL
CO2 SERPL-SCNC: 27 MMOL/L
COLOR: YELLOW
CREAT SERPL-MCNC: 1.16 MG/DL
EGFR: 60 ML/MIN/1.73M2
ESTIMATED AVERAGE GLUCOSE: 114 MG/DL
GLUCOSE QUALITATIVE U: NEGATIVE MG/DL
GLUCOSE SERPL-MCNC: 104 MG/DL
HBA1C MFR BLD HPLC: 5.6 %
HCT VFR BLD CALC: 42.7 %
HDLC SERPL-MCNC: 57 MG/DL
HGB BLD-MCNC: 13.6 G/DL
KETONES URINE: ABNORMAL MG/DL
LDLC SERPL CALC-MCNC: 50 MG/DL
LEUKOCYTE ESTERASE URINE: ABNORMAL
MCHC RBC-ENTMCNC: 31.6 PG
MCHC RBC-ENTMCNC: 31.9 G/DL
MCV RBC AUTO: 99.1 FL
NITRITE URINE: NEGATIVE
NONHDLC SERPL-MCNC: 66 MG/DL
PH URINE: 5.5
PLATELET # BLD AUTO: 221 K/UL
POTASSIUM SERPL-SCNC: 4.6 MMOL/L
PROT SERPL-MCNC: 6.7 G/DL
PROTEIN URINE: NEGATIVE MG/DL
RBC # BLD: 4.31 M/UL
RBC # FLD: 14.1 %
SODIUM SERPL-SCNC: 140 MMOL/L
SPECIFIC GRAVITY URINE: 1.02
TRIGL SERPL-MCNC: 79 MG/DL
TSH SERPL-ACNC: 3.61 UIU/ML
UROBILINOGEN URINE: 0.2 MG/DL
WBC # FLD AUTO: 7.96 K/UL

## 2025-01-09 PROCEDURE — 99214 OFFICE O/P EST MOD 30 MIN: CPT

## 2025-01-09 PROCEDURE — G2211 COMPLEX E/M VISIT ADD ON: CPT

## 2025-01-13 DIAGNOSIS — R20.8 OTHER DISTURBANCES OF SKIN SENSATION: ICD-10-CM

## 2025-02-05 ENCOUNTER — RX RENEWAL (OUTPATIENT)
Age: 89
End: 2025-02-05

## 2025-02-07 ENCOUNTER — NON-APPOINTMENT (OUTPATIENT)
Age: 89
End: 2025-02-07

## 2025-02-07 ENCOUNTER — APPOINTMENT (OUTPATIENT)
Dept: ELECTROPHYSIOLOGY | Facility: CLINIC | Age: 89
End: 2025-02-07
Payer: MEDICARE

## 2025-02-07 PROCEDURE — 93296 REM INTERROG EVL PM/IDS: CPT

## 2025-02-07 PROCEDURE — 93295 DEV INTERROG REMOTE 1/2/MLT: CPT

## 2025-03-11 ENCOUNTER — APPOINTMENT (OUTPATIENT)
Dept: ELECTROPHYSIOLOGY | Facility: CLINIC | Age: 89
End: 2025-03-11
Payer: MEDICARE

## 2025-03-11 ENCOUNTER — NON-APPOINTMENT (OUTPATIENT)
Age: 89
End: 2025-03-11

## 2025-03-11 VITALS
SYSTOLIC BLOOD PRESSURE: 100 MMHG | WEIGHT: 157 LBS | HEART RATE: 55 BPM | TEMPERATURE: 97.3 F | DIASTOLIC BLOOD PRESSURE: 58 MMHG | HEIGHT: 72 IN | RESPIRATION RATE: 16 BRPM | OXYGEN SATURATION: 99 % | BODY MASS INDEX: 21.26 KG/M2

## 2025-03-11 DIAGNOSIS — I47.20 VENTRICULAR TACHYCARDIA, UNSPECIFIED: ICD-10-CM

## 2025-03-11 DIAGNOSIS — Z45.010 ENCOUNTER FOR CHECKING AND TESTING OF CARDIAC PACEMAKER PULSE GENERATOR [BATTERY]: ICD-10-CM

## 2025-03-11 DIAGNOSIS — I50.20 UNSPECIFIED SYSTOLIC (CONGESTIVE) HEART FAILURE: ICD-10-CM

## 2025-03-11 DIAGNOSIS — Z79.899 OTHER LONG TERM (CURRENT) DRUG THERAPY: ICD-10-CM

## 2025-03-11 DIAGNOSIS — I10 ESSENTIAL (PRIMARY) HYPERTENSION: ICD-10-CM

## 2025-03-11 DIAGNOSIS — Z45.02 ENCOUNTER FOR ADJUSTMENT AND MANAGEMENT OF AUTOMATIC IMPLANTABLE CARDIAC DEFIBRILLATOR: ICD-10-CM

## 2025-03-11 DIAGNOSIS — E78.00 PURE HYPERCHOLESTEROLEMIA, UNSPECIFIED: ICD-10-CM

## 2025-03-11 PROCEDURE — G2211 COMPLEX E/M VISIT ADD ON: CPT

## 2025-03-11 PROCEDURE — 99214 OFFICE O/P EST MOD 30 MIN: CPT

## 2025-03-11 PROCEDURE — 93000 ELECTROCARDIOGRAM COMPLETE: CPT

## 2025-03-11 RX ORDER — ERYTHROMYCIN 5 MG/G
5 OINTMENT OPHTHALMIC
Refills: 0 | Status: ACTIVE | COMMUNITY
Start: 2025-03-11

## 2025-03-11 RX ORDER — SIMVASTATIN 20 MG/1
20 TABLET, FILM COATED ORAL
Refills: 0 | Status: ACTIVE | COMMUNITY
Start: 2025-03-11

## 2025-03-11 RX ORDER — MIRTAZAPINE 7.5 MG/1
7.5 TABLET, FILM COATED ORAL
Refills: 0 | Status: ACTIVE | COMMUNITY
Start: 2025-03-11

## 2025-03-29 ENCOUNTER — NON-APPOINTMENT (OUTPATIENT)
Age: 89
End: 2025-03-29

## 2025-04-03 ENCOUNTER — APPOINTMENT (OUTPATIENT)
Dept: CARDIOLOGY | Facility: CLINIC | Age: 89
End: 2025-04-03
Payer: MEDICARE

## 2025-04-03 VITALS
HEART RATE: 62 BPM | SYSTOLIC BLOOD PRESSURE: 124 MMHG | HEIGHT: 72 IN | WEIGHT: 157 LBS | BODY MASS INDEX: 21.26 KG/M2 | OXYGEN SATURATION: 98 % | DIASTOLIC BLOOD PRESSURE: 62 MMHG

## 2025-04-03 DIAGNOSIS — I49.3 VENTRICULAR PREMATURE DEPOLARIZATION: ICD-10-CM

## 2025-04-03 DIAGNOSIS — I77.810 THORACIC AORTIC ECTASIA: ICD-10-CM

## 2025-04-03 DIAGNOSIS — Z79.899 OTHER LONG TERM (CURRENT) DRUG THERAPY: ICD-10-CM

## 2025-04-03 DIAGNOSIS — Z98.61 ATHEROSCLEROTIC HEART DISEASE OF NATIVE CORONARY ARTERY W/OUT ANGINA PECTORIS: ICD-10-CM

## 2025-04-03 DIAGNOSIS — I50.20 UNSPECIFIED SYSTOLIC (CONGESTIVE) HEART FAILURE: ICD-10-CM

## 2025-04-03 DIAGNOSIS — I25.10 ATHEROSCLEROTIC HEART DISEASE OF NATIVE CORONARY ARTERY W/OUT ANGINA PECTORIS: ICD-10-CM

## 2025-04-03 PROCEDURE — 99215 OFFICE O/P EST HI 40 MIN: CPT

## 2025-04-17 ENCOUNTER — OUTPATIENT (OUTPATIENT)
Dept: OUTPATIENT SERVICES | Facility: HOSPITAL | Age: 89
LOS: 1 days | End: 2025-04-17

## 2025-04-17 VITALS
RESPIRATION RATE: 16 BRPM | HEART RATE: 52 BPM | OXYGEN SATURATION: 97 % | TEMPERATURE: 98 F | DIASTOLIC BLOOD PRESSURE: 60 MMHG | WEIGHT: 164.02 LBS | HEIGHT: 72 IN | SYSTOLIC BLOOD PRESSURE: 130 MMHG

## 2025-04-17 DIAGNOSIS — Z95.810 PRESENCE OF AUTOMATIC (IMPLANTABLE) CARDIAC DEFIBRILLATOR: Chronic | ICD-10-CM

## 2025-04-17 DIAGNOSIS — Z95.0 PRESENCE OF CARDIAC PACEMAKER: ICD-10-CM

## 2025-04-17 DIAGNOSIS — Z98.890 OTHER SPECIFIED POSTPROCEDURAL STATES: Chronic | ICD-10-CM

## 2025-04-17 DIAGNOSIS — I50.22 CHRONIC SYSTOLIC (CONGESTIVE) HEART FAILURE: ICD-10-CM

## 2025-04-17 DIAGNOSIS — Z96.659 PRESENCE OF UNSPECIFIED ARTIFICIAL KNEE JOINT: Chronic | ICD-10-CM

## 2025-04-17 DIAGNOSIS — Z95.5 PRESENCE OF CORONARY ANGIOPLASTY IMPLANT AND GRAFT: Chronic | ICD-10-CM

## 2025-04-17 LAB
ANION GAP SERPL CALC-SCNC: 9 MMOL/L — SIGNIFICANT CHANGE UP (ref 7–14)
BUN SERPL-MCNC: 18 MG/DL — SIGNIFICANT CHANGE UP (ref 7–23)
CALCIUM SERPL-MCNC: 9.1 MG/DL — SIGNIFICANT CHANGE UP (ref 8.4–10.5)
CHLORIDE SERPL-SCNC: 104 MMOL/L — SIGNIFICANT CHANGE UP (ref 98–107)
CO2 SERPL-SCNC: 25 MMOL/L — SIGNIFICANT CHANGE UP (ref 22–31)
CREAT SERPL-MCNC: 0.93 MG/DL — SIGNIFICANT CHANGE UP (ref 0.5–1.3)
EGFR: 78 ML/MIN/1.73M2 — SIGNIFICANT CHANGE UP
EGFR: 78 ML/MIN/1.73M2 — SIGNIFICANT CHANGE UP
GLUCOSE SERPL-MCNC: 72 MG/DL — SIGNIFICANT CHANGE UP (ref 70–99)
HCT VFR BLD CALC: 41.1 % — SIGNIFICANT CHANGE UP (ref 39–50)
HGB BLD-MCNC: 13.6 G/DL — SIGNIFICANT CHANGE UP (ref 13–17)
MCHC RBC-ENTMCNC: 32.4 PG — SIGNIFICANT CHANGE UP (ref 27–34)
MCHC RBC-ENTMCNC: 33.1 G/DL — SIGNIFICANT CHANGE UP (ref 32–36)
MCV RBC AUTO: 97.9 FL — SIGNIFICANT CHANGE UP (ref 80–100)
NRBC # BLD AUTO: 0 K/UL — SIGNIFICANT CHANGE UP (ref 0–0)
NRBC # FLD: 0 K/UL — SIGNIFICANT CHANGE UP (ref 0–0)
NRBC BLD AUTO-RTO: 0 /100 WBCS — SIGNIFICANT CHANGE UP (ref 0–0)
PLATELET # BLD AUTO: 264 K/UL — SIGNIFICANT CHANGE UP (ref 150–400)
POTASSIUM SERPL-MCNC: 4.4 MMOL/L — SIGNIFICANT CHANGE UP (ref 3.5–5.3)
POTASSIUM SERPL-SCNC: 4.4 MMOL/L — SIGNIFICANT CHANGE UP (ref 3.5–5.3)
RBC # BLD: 4.2 M/UL — SIGNIFICANT CHANGE UP (ref 4.2–5.8)
RBC # FLD: 13.9 % — SIGNIFICANT CHANGE UP (ref 10.3–14.5)
SODIUM SERPL-SCNC: 138 MMOL/L — SIGNIFICANT CHANGE UP (ref 135–145)
WBC # BLD: 7.6 K/UL — SIGNIFICANT CHANGE UP (ref 3.8–10.5)
WBC # FLD AUTO: 7.6 K/UL — SIGNIFICANT CHANGE UP (ref 3.8–10.5)

## 2025-04-17 NOTE — H&P PST ADULT - PROBLEM SELECTOR PLAN 1
Pt scheduled for surgery and preop instructions including instructions for taking Famotidine  on the day of surgery, given verbally and with use of  written materials, and patient confirming understanding of such instructions using  teach back method.  Pt reviewed EP preop instructions and confirmed

## 2025-04-17 NOTE — H&P PST ADULT - MS GEN HX ROS MEA POS PC
Left knee pain r/t fracture of patella 3 months/arthralgia/leg pain L Left knee pain r/t fracture of patella 3 months ago - doing PT and healing well/arthralgia/leg pain L

## 2025-04-17 NOTE — H&P PST ADULT - HISTORY OF PRESENT ILLNESS
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  Last ICD interrogation 11/22 without events or significant ectopy  Maintained on amio 100mg x 4 years  Known dilated aorta, 5.0 cm on TTE, (4.8cm in 2014), TTE: 55-60%, mod LVH, mild DD  No ICD shocks   Recent ICD check showing 1 VT episode in Feb 2021 terminated via 1 ATP, 22 beats of VT in May 2022 self terminated.  2/4/23 cath reveals:  1. Severe single vessel CAD involving proximal RCA .   2. Mild disease in LAD, LCx, and Ramus.   3. Plan for staged RCA  PCI in next few weeks.   4. Recommend aggressive risk factor modification and medical therapy for CAD.  2/24/23 cath conclusions:   1. Unsuccessful RCA  PCI due to unsuccessful wire crossing.   2. Will plan for re-attempt  PCI in next few weeks.   Followed by outpatient cardiologist Dr Long Villa. Pt feels well this morning. Here today for reattempt to PCI to RCA .  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 Pt with known dilated aorta, 5.0 cm on TTE, (4.8cm in 2014), Echo 3/24 EF 39% , OV systolic function moderately decreased - hx cardiac cath 2023 - 100% RCA  s/p atherectomy with reduction to 95% - pt on Lasix for increased SOB with improvement in symptom. Pt remains very active - reports no ICD shocks

## 2025-04-17 NOTE — H&P PST ADULT - NSICDXPASTMEDICALHX_GEN_ALL_CORE_FT
PAST MEDICAL HISTORY:  Arrhythmia s/p AICD/PPM    Benign prostatic hyperplasia     Coronary artery disease s/p stents    Hyperlipidemia     Hypertension     Presence of biventricular AICD

## 2025-04-17 NOTE — H&P PST ADULT - NSANTHOSAYNRD_GEN_A_CORE
No. BONITA screening performed.  STOP BANG Legend: 0-2 = LOW Risk; 3-4 = INTERMEDIATE Risk; 5-8 = HIGH Risk

## 2025-04-23 NOTE — ASU PATIENT PROFILE, ADULT - FALL HARM RISK - UNIVERSAL INTERVENTIONS
Bed in lowest position, wheels locked, appropriate side rails in place/Call bell, personal items and telephone in reach/Instruct patient to call for assistance before getting out of bed or chair/Non-slip footwear when patient is out of bed/[place] to call system/Physically safe environment - no spills, clutter or unnecessary equipment/Purposeful Proactive Rounding/Room/bathroom lighting operational, light cord in reach

## 2025-04-24 ENCOUNTER — OUTPATIENT (OUTPATIENT)
Dept: OUTPATIENT SERVICES | Facility: HOSPITAL | Age: 89
LOS: 1 days | End: 2025-04-24
Payer: MEDICARE

## 2025-04-24 ENCOUNTER — TRANSCRIPTION ENCOUNTER (OUTPATIENT)
Age: 89
End: 2025-04-24

## 2025-04-24 VITALS
OXYGEN SATURATION: 97 % | RESPIRATION RATE: 20 BRPM | DIASTOLIC BLOOD PRESSURE: 71 MMHG | SYSTOLIC BLOOD PRESSURE: 155 MMHG | HEART RATE: 60 BPM

## 2025-04-24 VITALS
HEIGHT: 72 IN | OXYGEN SATURATION: 98 % | TEMPERATURE: 98 F | WEIGHT: 162.92 LBS | SYSTOLIC BLOOD PRESSURE: 119 MMHG | RESPIRATION RATE: 18 BRPM | HEART RATE: 53 BPM | DIASTOLIC BLOOD PRESSURE: 74 MMHG

## 2025-04-24 DIAGNOSIS — Z95.810 PRESENCE OF AUTOMATIC (IMPLANTABLE) CARDIAC DEFIBRILLATOR: Chronic | ICD-10-CM

## 2025-04-24 DIAGNOSIS — Z98.890 OTHER SPECIFIED POSTPROCEDURAL STATES: Chronic | ICD-10-CM

## 2025-04-24 DIAGNOSIS — Z96.659 PRESENCE OF UNSPECIFIED ARTIFICIAL KNEE JOINT: Chronic | ICD-10-CM

## 2025-04-24 DIAGNOSIS — Z95.5 PRESENCE OF CORONARY ANGIOPLASTY IMPLANT AND GRAFT: Chronic | ICD-10-CM

## 2025-04-24 DIAGNOSIS — I50.22 CHRONIC SYSTOLIC (CONGESTIVE) HEART FAILURE: ICD-10-CM

## 2025-04-24 PROCEDURE — 93010 ELECTROCARDIOGRAM REPORT: CPT | Mod: 77

## 2025-04-24 PROCEDURE — 93010 ELECTROCARDIOGRAM REPORT: CPT

## 2025-04-24 PROCEDURE — 33263 RMVL & RPLCMT DFB GEN 2 LEAD: CPT

## 2025-04-24 RX ORDER — CARVEDILOL 3.12 MG/1
1 TABLET, FILM COATED ORAL
Refills: 0 | DISCHARGE

## 2025-04-24 RX ORDER — LOSARTAN POTASSIUM 100 MG/1
1 TABLET, FILM COATED ORAL
Refills: 0 | DISCHARGE

## 2025-04-24 RX ORDER — CLONAZEPAM 0.5 MG/1
3 TABLET ORAL
Refills: 0 | DISCHARGE

## 2025-04-24 RX ORDER — FUROSEMIDE 10 MG/ML
0.5 INJECTION INTRAMUSCULAR; INTRAVENOUS
Refills: 0 | DISCHARGE

## 2025-04-24 RX ORDER — AMIODARONE HYDROCHLORIDE 50 MG/ML
1 INJECTION, SOLUTION INTRAVENOUS
Refills: 0 | DISCHARGE

## 2025-04-24 RX ORDER — MIRTAZAPINE 30 MG/1
1 TABLET, FILM COATED ORAL
Refills: 0 | DISCHARGE

## 2025-04-24 RX ORDER — TAMSULOSIN HYDROCHLORIDE 0.4 MG/1
1 CAPSULE ORAL
Refills: 0 | DISCHARGE

## 2025-04-24 RX ORDER — ASPIRIN 325 MG
1 TABLET ORAL
Refills: 0 | DISCHARGE

## 2025-04-24 RX ORDER — ESCITALOPRAM OXALATE 20 MG/1
1 TABLET ORAL
Refills: 0 | DISCHARGE

## 2025-04-24 RX ORDER — ERYTHROMYCIN 5 MG/G
1 OINTMENT OPHTHALMIC
Refills: 0 | DISCHARGE

## 2025-04-24 RX ORDER — FUROSEMIDE 10 MG/ML
10 INJECTION INTRAMUSCULAR; INTRAVENOUS
Refills: 0 | DISCHARGE

## 2025-04-24 RX ORDER — FINASTERIDE 1 MG/1
1 TABLET, FILM COATED ORAL
Refills: 0 | DISCHARGE

## 2025-04-24 RX ADMIN — Medication 1 APPLICATION(S): at 16:32

## 2025-04-24 NOTE — ASU DISCHARGE PLAN (ADULT/PEDIATRIC) - NO SPORTS/GYM DURATION
Detail Level: Simple for rwo weeks Detail Level: Zone Sunscreen Recommendations: Patient was encouraged to wear broad spectrum SPF 30 or higher sunscreen to sun exposed skin

## 2025-04-24 NOTE — ASU DISCHARGE PLAN (ADULT/PEDIATRIC) - FINANCIAL ASSISTANCE
[facility] provides services at a reduced cost to those who are determined to be eligible through [facility]’s financial assistance program. Information regarding [facility]’s financial assistance program can be found by going to [email or web address] or by calling [phone].

## 2025-04-24 NOTE — ASU DISCHARGE PLAN (ADULT/PEDIATRIC) - ASU DC SPECIAL INSTRUCTIONSFT
Post procedure ICD generator change teaching done.  Written instructions and contact information provided.   Instructed to remove the dressing tomorrow night.   Given a home monitor with written and verbal instructions.   All questions answered.   Patient has a followup appointment in the device clinic on 5/6/25 at 11:00am  4th floor Oncology [facility]   [phone].

## 2025-04-24 NOTE — ASU DISCHARGE PLAN (ADULT/PEDIATRIC) - CARE PROVIDER_API CALL
Nuno Koo  Cardiovascular Disease  [address] [place] [address], [address]  [place] [address]  Phone: [phone]  Fax: [phone]  Follow Up Time:

## 2025-04-24 NOTE — PRE PROCEDURE NOTE - PRE PROCEDURE EVALUATION
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 H&P from PST reviewed. Medication reconciliation edited/updated where appropriate. Patient is not on systemic AC. Last PO intake was 4/23/2025 at 21:00. No history of BONITA. Dentures removed. EKG reviewed. Procedure explained in detail. Risks/benefits discussed. All questions answered. Consent obtained.

## 2025-04-24 NOTE — CHART NOTE - NSCHARTNOTEFT_GEN_A_CORE
Pt s/p ICD generator change. As per Dr. Koo, no need for antibiotics. Obtain EKG. DC home after recovery completed.

## 2025-05-09 ENCOUNTER — APPOINTMENT (OUTPATIENT)
Dept: ELECTROPHYSIOLOGY | Facility: CLINIC | Age: 89
End: 2025-05-09

## 2025-05-12 ENCOUNTER — RX RENEWAL (OUTPATIENT)
Age: 89
End: 2025-05-12

## 2025-05-16 ENCOUNTER — NON-APPOINTMENT (OUTPATIENT)
Age: 89
End: 2025-05-16

## 2025-05-16 ENCOUNTER — APPOINTMENT (OUTPATIENT)
Dept: ELECTROPHYSIOLOGY | Facility: CLINIC | Age: 89
End: 2025-05-16
Payer: MEDICARE

## 2025-05-16 VITALS — HEART RATE: 68 BPM | DIASTOLIC BLOOD PRESSURE: 61 MMHG | SYSTOLIC BLOOD PRESSURE: 107 MMHG

## 2025-05-16 PROBLEM — Z95.810 PRESENCE OF AUTOMATIC (IMPLANTABLE) CARDIAC DEFIBRILLATOR: Chronic | Status: ACTIVE | Noted: 2025-04-17

## 2025-05-16 PROCEDURE — 99024 POSTOP FOLLOW-UP VISIT: CPT

## 2025-08-07 ENCOUNTER — RX RENEWAL (OUTPATIENT)
Age: 89
End: 2025-08-07

## 2025-08-15 ENCOUNTER — APPOINTMENT (OUTPATIENT)
Dept: ELECTROPHYSIOLOGY | Facility: CLINIC | Age: 89
End: 2025-08-15
Payer: MEDICARE

## 2025-08-15 ENCOUNTER — NON-APPOINTMENT (OUTPATIENT)
Age: 89
End: 2025-08-15

## 2025-08-15 PROCEDURE — 93296 REM INTERROG EVL PM/IDS: CPT

## 2025-08-15 PROCEDURE — 93294 REM INTERROG EVL PM/LDLS PM: CPT

## 2025-09-17 ENCOUNTER — RX RENEWAL (OUTPATIENT)
Age: 89
End: 2025-09-17